# Patient Record
Sex: FEMALE | Race: WHITE | NOT HISPANIC OR LATINO | Employment: FULL TIME | URBAN - METROPOLITAN AREA
[De-identification: names, ages, dates, MRNs, and addresses within clinical notes are randomized per-mention and may not be internally consistent; named-entity substitution may affect disease eponyms.]

---

## 2017-08-11 ENCOUNTER — APPOINTMENT (OUTPATIENT)
Dept: WOMENS IMAGING | Facility: HOSPITAL | Age: 47
End: 2017-08-11

## 2017-08-11 PROCEDURE — 77067 SCR MAMMO BI INCL CAD: CPT | Performed by: RADIOLOGY

## 2018-11-20 ENCOUNTER — APPOINTMENT (OUTPATIENT)
Dept: WOMENS IMAGING | Facility: HOSPITAL | Age: 48
End: 2018-11-20

## 2018-11-20 PROCEDURE — 77067 SCR MAMMO BI INCL CAD: CPT | Performed by: RADIOLOGY

## 2019-09-12 PROBLEM — K21.9 GERD WITHOUT ESOPHAGITIS: Status: ACTIVE | Noted: 2019-09-12

## 2019-09-12 PROBLEM — E78.1 HYPERTRIGLYCERIDEMIA: Status: ACTIVE | Noted: 2019-09-12

## 2019-09-12 RX ORDER — VALACYCLOVIR HYDROCHLORIDE 1 G/1
1000 TABLET, FILM COATED ORAL
COMMUNITY
Start: 2017-12-21 | End: 2020-11-17 | Stop reason: SDUPTHER

## 2019-09-12 RX ORDER — BUPROPION HYDROCHLORIDE 150 MG/1
TABLET ORAL
COMMUNITY
Start: 2017-10-27 | End: 2019-11-18

## 2019-09-12 RX ORDER — CLONAZEPAM 0.5 MG/1
TABLET ORAL
Qty: 60 TABLET | Refills: 0
Start: 2019-09-12 | End: 2019-09-16 | Stop reason: SDUPTHER

## 2019-09-16 ENCOUNTER — OFFICE VISIT (OUTPATIENT)
Dept: FAMILY MEDICINE CLINIC | Facility: CLINIC | Age: 49
End: 2019-09-16

## 2019-09-16 VITALS
HEART RATE: 73 BPM | BODY MASS INDEX: 30.4 KG/M2 | TEMPERATURE: 98.3 F | SYSTOLIC BLOOD PRESSURE: 104 MMHG | DIASTOLIC BLOOD PRESSURE: 66 MMHG | OXYGEN SATURATION: 94 % | WEIGHT: 178.1 LBS | HEIGHT: 64 IN

## 2019-09-16 DIAGNOSIS — F51.04 PSYCHOPHYSIOLOGICAL INSOMNIA: Primary | ICD-10-CM

## 2019-09-16 DIAGNOSIS — E53.8 VITAMIN B12 DEFICIENCY: ICD-10-CM

## 2019-09-16 DIAGNOSIS — F41.9 ANXIETY AND DEPRESSION: ICD-10-CM

## 2019-09-16 DIAGNOSIS — F32.A ANXIETY AND DEPRESSION: ICD-10-CM

## 2019-09-16 DIAGNOSIS — E61.1 IRON DEFICIENCY: ICD-10-CM

## 2019-09-16 PROCEDURE — 99214 OFFICE O/P EST MOD 30 MIN: CPT | Performed by: FAMILY MEDICINE

## 2019-09-16 PROCEDURE — 96372 THER/PROPH/DIAG INJ SC/IM: CPT | Performed by: FAMILY MEDICINE

## 2019-09-16 RX ORDER — CYANOCOBALAMIN 1000 UG/ML
1000 INJECTION, SOLUTION INTRAMUSCULAR; SUBCUTANEOUS
Status: DISCONTINUED | OUTPATIENT
Start: 2019-09-16 | End: 2023-02-13

## 2019-09-16 RX ORDER — ZOLPIDEM TARTRATE 10 MG/1
10 TABLET ORAL NIGHTLY PRN
Qty: 10 TABLET | Refills: 0 | Status: SHIPPED | OUTPATIENT
Start: 2019-09-16 | End: 2019-11-18

## 2019-09-16 RX ORDER — CLONAZEPAM 0.5 MG/1
TABLET ORAL
Qty: 60 TABLET | Refills: 0
Start: 2019-09-16 | End: 2019-10-22 | Stop reason: SDUPTHER

## 2019-09-16 RX ADMIN — CYANOCOBALAMIN 1000 MCG: 1000 INJECTION, SOLUTION INTRAMUSCULAR; SUBCUTANEOUS at 17:01

## 2019-09-16 NOTE — PROGRESS NOTES
Chief Complaint   Patient presents with   • Depression   • Anxiety   • Heartburn       Subjective   Media ZINA Lopez is a 49 y.o. female.     History of Present Illness   F/U KEVIN.  Doing well with meds.  oN daily.   F/U ARCENIO.  I am doing well with anxiety despite a lot of deaths in family.    F/U insomnia.   I have had trouble for 15 years.   I am having trouble with sleep recently.  I would like to switch to ambien 10 at [facility].     FU B12 def.  I have not received B12 in a while.      The following portions of the patient's history were reviewed and updated as appropriate: allergies, current medications, past family history, past medical history, past social history, past surgical history and problem list.    Review of Systems   Constitutional: Negative for appetite change and fatigue.   HENT: Negative for nosebleeds and sore throat.    Eyes: Negative for blurred vision and visual disturbance.   Respiratory: Negative for shortness of breath and wheezing.    Cardiovascular: Negative for chest pain and leg swelling.   Gastrointestinal: Negative for abdominal distention and abdominal pain.   Endocrine: Negative for cold intolerance and polyuria.   Genitourinary: Negative for dysuria and hematuria.   Musculoskeletal: Negative for arthralgias and myalgias.   Skin: Negative for color change and rash.   Neurological: Negative for weakness and confusion.   Psychiatric/Behavioral: Negative for agitation and depressed mood.       Patient Active Problem List   Diagnosis   • Back pain   • Anxiety and depression   • Goiter   • Fatigue   • Fibromyalgia   • Herpes zoster   • Insomnia   • Iron deficiency   • Myalgia   • Vitamin B12 deficiency   • Vitamin D deficiency   • History of MRSA infection   • Large bowel obstruction (CMS/HCC)   • Seasonal allergies   • GERD without esophagitis   • Hypertriglyceridemia       No Known Allergies      Current Outpatient Medications:   •  Bioflavonoid Products (VITAMIN C) chewable tablet, Chew 240  mg 2 (Two) Times a Day., Disp: , Rfl:   •  buPROPion XL (WELLBUTRIN XL) 150 MG 24 hr tablet, , Disp: , Rfl:   •  Cholecalciferol (VITAMIN D3) 5000 UNITS capsule capsule, Take 5,000 Units by mouth daily., Disp: , Rfl:   •  clonazePAM (KlonoPIN) 0.5 MG tablet, 1/2 in AM, 1/2 IN AFTERNOON AND TWO AT NIGHT PRN ANXIETY, Disp: 60 tablet, Rfl: 0  •  diclofenac (VOLTAREN) 1 % gel gel, Place 4 g on the skin as directed by provider 4 (Four) Times a Day., Disp: , Rfl:   •  omeprazole OTC (PriLOSEC OTC) 20 MG EC tablet, Take 20 mg by mouth daily., Disp: , Rfl:   •  Pediatric Multiple Vit-C-FA (FLINSTONES GUMMIES OMEGA-3 DHA) chewable tablet, Chew 2 tablets Daily., Disp: , Rfl:   •  valACYclovir (VALTREX) 1000 MG tablet, Take 1,000 mg by mouth., Disp: , Rfl:   •  zolpidem (AMBIEN) 10 MG tablet, Take 1 tablet by mouth At Night As Needed for Sleep., Disp: 10 tablet, Rfl: 0    Current Facility-Administered Medications:   •  cyanocobalamin injection 1,000 mcg, 1,000 mcg, Intramuscular, Q28 Days, Gamal Gamez MD    Past Medical History:   Diagnosis Date   • Acute upper respiratory infection    • Anemia    • Anxiety    • Arthritis    • Depression    • External hemorrhoid, thrombosed    • Fibromyalgia    • Folliculitis    • ARCENIO (generalized anxiety disorder)    • Gastroparesis    • GERD (gastroesophageal reflux disease)    • GERD without esophagitis    • Heartburn    • Hemorrhoids    • High risk medication use    • History of staph infection     LAST TIME 4 YEARS AGO INSIDE RIGHT NARIS   • Hypertriglyceridemia    • Immunization deficiency    • Infected prosthetic mesh of abdominal wall (CMS/HCC)    • Insomnia    • MRSA carrier    • Persistent insomnia    • Pica    • PONV (postoperative nausea and vomiting)    • Shingles    • Strain of rectus abdominis muscle    • Vitamin B12 deficiency    • Volvulus of intestine (CMS/HCC)        Past Surgical History:   Procedure Laterality Date   • ABDOMINOPLASTY     • BACK SURGERY  09/2015     [facility] Surgery[facility]by [facility]   • BARIATRIC SURGERY     • CHOLECYSTECTOMY     • COLECTOMY PARTIAL / TOTAL Right     Right Hemicolectomy   • COLONOSCOPY N/A 7/12/2016    Procedure: COLONOSCOPY into small bowel;  Surgeon: Riley Narayan MD;  Location: [facility] ENDOSCOPY;  Service:    • EXPLORATORY LAPAROTOMY N/A 4/20/2016    Procedure: LAPAROTOMY EXPLORATORY, POSSIBLE BOWEL RESECTION, POSSIBLE OSTOMY;  Surgeon: Riley Narayan MD;  Location: [facility] MAIN OR;  Service:    • EXPLORATORY LAPAROTOMY N/A 12/2/2016    Procedure: exploratory laparotomy, removal of  infected abdominal wall mesh. ;  Surgeon: Riley Narayan MD;  Location: [facility] MAIN OR;  Service:    • GASTRIC BYPASS  2000   • HIP ARTHROSCOPY W/ LABRAL REPAIR Left    • LAPAROSCOPIC TUBAL LIGATION     • OTHER SURGICAL HISTORY      LASER ON BACK   • UMBILICAL HERNIA REPAIR         Family History   Problem Relation Age of Onset   • Heart attack Father    • Diabetes Father    • Stroke Father    • Pancreatitis Father    • Diabetes Sister    • Breast cancer Maternal Grandmother    • Diabetes Paternal Grandmother    • Osteoporosis Paternal Grandmother    • Diabetes Paternal Grandfather    • Arthritis Mother    • No Known Problems Other        Social History     Tobacco Use   • Smoking status: Former Smoker   • Smokeless tobacco: Never Used   • Tobacco comment: smoked less than 1 pack per day for 10 year   Substance Use Topics   • Alcohol use: Yes     Comment: once every couple of months-7 or less drinks per week            Objective     Vitals:    09/16/19 1619   BP: 104/66   Pulse: 73   Temp: 98.3 °F (36.8 °C)   SpO2: 94%     Body mass index is 30.57 kg/m².    Physical Exam   Constitutional: She is oriented to person, place, and time. She appears well-developed and well-nourished.   HENT:   Head: Normocephalic and atraumatic.   Right Ear: External ear normal.   Left Ear: External ear normal.   Nose: Nose normal.    Mouth/Throat: Oropharynx is clear and moist.   Eyes: Conjunctivae and EOM are normal. Pupils are equal, round, and reactive to light.   Neck: Normal range of motion. Neck supple. No tracheal deviation present. No thyromegaly present.   Cardiovascular: Normal rate, regular rhythm and normal heart sounds. Exam reveals no gallop and no friction rub.   No murmur heard.  Pulmonary/Chest: Effort normal and breath sounds normal. No respiratory distress. She exhibits no tenderness.   Abdominal: Soft. Bowel sounds are normal. She exhibits no distension. There is no tenderness.   Musculoskeletal: Normal range of motion. She exhibits no edema or tenderness.   Lymphadenopathy:     She has no cervical adenopathy.   Neurological: She is alert and oriented to person, place, and time. She displays normal reflexes. No cranial nerve deficit or sensory deficit. Coordination normal.   Skin: Skin is warm and dry.   Psychiatric: She has a normal mood and affect. Her behavior is normal. Judgment and thought content normal.   Nursing note and vitals reviewed.      Lab Results   Component Value Date    GLUCOSE 91 12/04/2016    BUN 15 12/04/2016    CREATININE 0.64 12/04/2016    EGFRIFNONA 100 12/04/2016    EGFRIFAFRI 109 12/03/2015    BCR 23.4 12/04/2016    K 4.0 12/04/2016    CO2 22.9 12/04/2016    CALCIUM 8.1 (L) 12/04/2016    PROTENTOTREF 6.8 12/03/2015    ALBUMIN 4.40 04/19/2016    LABIL2 1.8 12/03/2015    AST 20 04/19/2016    ALT 13 04/19/2016       WBC   Date Value Ref Range Status   12/03/2016 12.16 (H) 4.50 - 10.70 10*3/mm3 Final     RBC   Date Value Ref Range Status   12/03/2016 3.43 (L) 3.90 - 5.20 10*6/mm3 Final     Hemoglobin   Date Value Ref Range Status   12/03/2016 9.1 (L) 11.9 - 15.5 g/dL Final     Hematocrit   Date Value Ref Range Status   12/03/2016 30.2 (L) 35.6 - 45.5 % Final     MCV   Date Value Ref Range Status   12/03/2016 88.0 80.5 - 98.2 fL Final     MCH   Date Value Ref Range Status   12/03/2016 26.5 (L) 26.9 -  32.0 pg Final     MCHC   Date Value Ref Range Status   12/03/2016 30.1 (L) 32.4 - 36.3 g/dL Final     RDW   Date Value Ref Range Status   12/03/2016 14.4 (H) 11.7 - 13.0 % Final     RDW-SD   Date Value Ref Range Status   12/03/2016 46.7 37.0 - 54.0 fl Final     MPV   Date Value Ref Range Status   12/03/2016 10.2 6.0 - 12.0 fL Final     Platelets   Date Value Ref Range Status   12/03/2016 277 140 - 500 10*3/mm3 Final     Neutrophil %   Date Value Ref Range Status   12/03/2016 83.8 (H) 42.7 - 76.0 % Final     Lymphocyte %   Date Value Ref Range Status   12/03/2016 7.9 (L) 19.6 - 45.3 % Final     Monocyte %   Date Value Ref Range Status   12/03/2016 5.7 5.0 - 12.0 % Final     Eosinophil %   Date Value Ref Range Status   12/03/2016 2.3 0.3 - 6.2 % Final     Basophil %   Date Value Ref Range Status   12/03/2016 0.1 0.0 - 1.5 % Final     Immature Grans %   Date Value Ref Range Status   12/03/2016 0.2 0.0 - 0.5 % Final     Neutrophils, Absolute   Date Value Ref Range Status   12/03/2016 10.19 (H) 1.90 - 8.10 10*3/mm3 Final     Lymphocytes, Absolute   Date Value Ref Range Status   12/03/2016 0.96 0.90 - 4.80 10*3/mm3 Final     Monocytes, Absolute   Date Value Ref Range Status   12/03/2016 0.69 0.20 - 1.20 10*3/mm3 Final     Eosinophils, Absolute   Date Value Ref Range Status   12/03/2016 0.28 0.00 - 0.70 10*3/mm3 Final     Basophils, Absolute   Date Value Ref Range Status   12/03/2016 0.01 0.00 - 0.20 10*3/mm3 Final     Immature Grans, Absolute   Date Value Ref Range Status   12/03/2016 0.03 0.00 - 0.03 10*3/mm3 Final     nRBC   Date Value Ref Range Status   12/03/2015 CANCELED       Comment:     Result canceled by the ancillary       No results found for: HGBA1C    No results found for: [id number]    TSH   Date Value Ref Range Status   12/03/2015 2.830 0.450 - 4.500 uIU/mL Final       No results found for: CHOL  No results found for: TRIG  No results found for: HDL  No results found for: LDL  No results found for: VLDL  No  results found for: LDLHDL      Procedures    Assessment/Plan   Problems Addressed this Visit        Digestive    Iron deficiency    Vitamin B12 deficiency    Relevant Medications    cyanocobalamin injection 1,000 mcg (Start on 9/16/2019  5:45 PM)       Other    Anxiety and depression    Relevant Medications    clonazePAM (KlonoPIN) 0.5 MG tablet    Insomnia - Primary          No orders of the defined types were placed in this encounter.    Plan is to wean off clonazepam and switch to ambien at night.    Current Outpatient Medications   Medication Sig Dispense Refill   • Bioflavonoid Products (VITAMIN C) chewable tablet Chew 240 mg 2 (Two) Times a Day.     • buPROPion XL (WELLBUTRIN XL) 150 MG 24 hr tablet      • Cholecalciferol (VITAMIN D3) 5000 UNITS capsule capsule Take 5,000 Units by mouth daily.     • clonazePAM (KlonoPIN) 0.5 MG tablet 1/2 in AM, 1/2 IN AFTERNOON AND TWO AT NIGHT PRN ANXIETY 60 tablet 0   • diclofenac (VOLTAREN) 1 % gel gel Place 4 g on the skin as directed by provider 4 (Four) Times a Day.     • omeprazole OTC (PriLOSEC OTC) 20 MG EC tablet Take 20 mg by mouth daily.     • Pediatric Multiple Vit-C-FA (FLINSTONES GUMMIES OMEGA-3 DHA) chewable tablet Chew 2 tablets Daily.     • valACYclovir (VALTREX) 1000 MG tablet Take 1,000 mg by mouth.     • zolpidem (AMBIEN) 10 MG tablet Take 1 tablet by mouth At Night As Needed for Sleep. 10 tablet 0     Current Facility-Administered Medications   Medication Dose Route Frequency Provider Last Rate Last Dose   • cyanocobalamin injection 1,000 mcg  1,000 mcg Intramuscular Q28 Days Gamal Gamez MD Media L. Smith had no medications administered during this visit.    Return in about 2 months (around 11/16/2019).    There are no Patient Instructions on file for this visit.

## 2019-10-17 ENCOUNTER — CLINICAL SUPPORT (OUTPATIENT)
Dept: FAMILY MEDICINE CLINIC | Facility: CLINIC | Age: 49
End: 2019-10-17

## 2019-10-17 DIAGNOSIS — E53.8 VITAMIN B12 DEFICIENCY: ICD-10-CM

## 2019-10-17 PROCEDURE — 96372 THER/PROPH/DIAG INJ SC/IM: CPT | Performed by: FAMILY MEDICINE

## 2019-10-17 RX ADMIN — CYANOCOBALAMIN 1000 MCG: 1000 INJECTION, SOLUTION INTRAMUSCULAR; SUBCUTANEOUS at 10:11

## 2019-10-22 DIAGNOSIS — F41.9 ANXIETY AND DEPRESSION: ICD-10-CM

## 2019-10-22 DIAGNOSIS — F32.A ANXIETY AND DEPRESSION: ICD-10-CM

## 2019-10-22 RX ORDER — CLONAZEPAM 0.5 MG/1
TABLET ORAL
Qty: 90 TABLET | Refills: 0 | Status: SHIPPED | OUTPATIENT
Start: 2019-10-22 | End: 2019-11-18 | Stop reason: SDUPTHER

## 2019-11-18 ENCOUNTER — OFFICE VISIT (OUTPATIENT)
Dept: FAMILY MEDICINE CLINIC | Facility: CLINIC | Age: 49
End: 2019-11-18

## 2019-11-18 VITALS
WEIGHT: 178 LBS | SYSTOLIC BLOOD PRESSURE: 113 MMHG | TEMPERATURE: 98.6 F | BODY MASS INDEX: 30.39 KG/M2 | HEIGHT: 64 IN | DIASTOLIC BLOOD PRESSURE: 72 MMHG | OXYGEN SATURATION: 98 % | HEART RATE: 65 BPM

## 2019-11-18 DIAGNOSIS — K21.9 GERD WITHOUT ESOPHAGITIS: ICD-10-CM

## 2019-11-18 DIAGNOSIS — M79.7 FIBROMYALGIA: ICD-10-CM

## 2019-11-18 DIAGNOSIS — F41.9 ANXIETY AND DEPRESSION: Primary | ICD-10-CM

## 2019-11-18 DIAGNOSIS — F51.01 PRIMARY INSOMNIA: ICD-10-CM

## 2019-11-18 DIAGNOSIS — F32.A ANXIETY AND DEPRESSION: Primary | ICD-10-CM

## 2019-11-18 PROCEDURE — 90686 IIV4 VACC NO PRSV 0.5 ML IM: CPT | Performed by: FAMILY MEDICINE

## 2019-11-18 PROCEDURE — 99214 OFFICE O/P EST MOD 30 MIN: CPT | Performed by: FAMILY MEDICINE

## 2019-11-18 PROCEDURE — 90471 IMMUNIZATION ADMIN: CPT | Performed by: FAMILY MEDICINE

## 2019-11-18 PROCEDURE — 96372 THER/PROPH/DIAG INJ SC/IM: CPT | Performed by: FAMILY MEDICINE

## 2019-11-18 RX ORDER — CLONAZEPAM 0.5 MG/1
TABLET ORAL
Qty: 105 TABLET | Refills: 3 | Status: SHIPPED | OUTPATIENT
Start: 2019-11-18 | End: 2020-03-12 | Stop reason: SDUPTHER

## 2019-11-18 RX ADMIN — CYANOCOBALAMIN 1000 MCG: 1000 INJECTION, SOLUTION INTRAMUSCULAR; SUBCUTANEOUS at 15:42

## 2019-11-18 NOTE — PROGRESS NOTES
Chief Complaint   Patient presents with   • Depression       Subjective   Media ZINA Lopez is a 49 y.o. female.     History of Present Illness   F/u depression and fibro.  I am not depressed any longer.  I am only taking clonazepam 3 tablets of the 0.5 mg a day.    F/U insomnia.  I am doing better with the anxiety.  Sleep is my only issue now.  I cannot stay asleep.  Ambien does not last long enough.  Clonazepam did well in the past.    F/U KEVIN.  Doing well with PRN PPI.    The following portions of the patient's history were reviewed and updated as appropriate: allergies, current medications, past family history, past medical history, past social history, past surgical history and problem list.    Review of Systems   Constitutional: Negative for appetite change and fatigue.   HENT: Negative for nosebleeds and sore throat.    Eyes: Negative for blurred vision and visual disturbance.   Respiratory: Negative for shortness of breath and wheezing.    Cardiovascular: Negative for chest pain and leg swelling.   Gastrointestinal: Negative for abdominal distention and abdominal pain.   Endocrine: Negative for cold intolerance and polyuria.   Genitourinary: Negative for dysuria and hematuria.   Musculoskeletal: Negative for arthralgias and myalgias.   Skin: Negative for color change and rash.   Neurological: Negative for weakness and confusion.   Psychiatric/Behavioral: Negative for agitation and depressed mood. The patient is nervous/anxious.        Patient Active Problem List   Diagnosis   • Back pain   • Anxiety and depression   • Goiter   • Fatigue   • Fibromyalgia   • Herpes zoster   • Insomnia   • Iron deficiency   • Myalgia   • Vitamin B12 deficiency   • Vitamin D deficiency   • History of MRSA infection   • Large bowel obstruction (CMS/HCC)   • Seasonal allergies   • GERD without esophagitis   • Hypertriglyceridemia       No Known Allergies      Current Outpatient Medications:   •  Bioflavonoid Products (VITAMIN C)  chewable tablet, Chew 240 mg 2 (Two) Times a Day., Disp: , Rfl:   •  Cholecalciferol (VITAMIN D3) 5000 UNITS capsule capsule, Take 5,000 Units by mouth daily., Disp: , Rfl:   •  clonazePAM (KlonoPIN) 0.5 MG tablet, 1/2 in AM AND Three AT NIGHT PRN ANXIETY/insomnia, Disp: 105 tablet, Rfl: 3  •  diclofenac (VOLTAREN) 1 % gel gel, Place 4 g on the skin as directed by provider 4 (Four) Times a Day., Disp: , Rfl:   •  Pediatric Multiple Vit-C-FA (FLINSTONES GUMMIES OMEGA-3 DHA) chewable tablet, Chew 2 tablets Daily., Disp: , Rfl:   •  valACYclovir (VALTREX) 1000 MG tablet, Take 1,000 mg by mouth., Disp: , Rfl:   •  omeprazole OTC (PriLOSEC OTC) 20 MG EC tablet, Take 20 mg by mouth daily., Disp: , Rfl:     Current Facility-Administered Medications:   •  cyanocobalamin injection 1,000 mcg, 1,000 mcg, Intramuscular, Q28 Days, Gamal Gamez MD, 1,000 mcg at 10/17/19 1011    Past Medical History:   Diagnosis Date   • Acute upper respiratory infection    • Anemia    • Anxiety    • Arthritis    • Depression    • External hemorrhoid, thrombosed    • Fibromyalgia    • Folliculitis    • ARCENIO (generalized anxiety disorder)    • Gastroparesis    • GERD (gastroesophageal reflux disease)    • GERD without esophagitis    • Heartburn    • Hemorrhoids    • High risk medication use    • History of staph infection     LAST TIME 4 YEARS AGO INSIDE RIGHT NARIS   • Hypertriglyceridemia    • Immunization deficiency    • Infected prosthetic mesh of abdominal wall (CMS/HCC)    • Insomnia    • MRSA carrier    • Persistent insomnia    • Pica    • PONV (postoperative nausea and vomiting)    • Shingles    • Strain of rectus abdominis muscle    • Vitamin B12 deficiency    • Volvulus of intestine (CMS/HCC)        Past Surgical History:   Procedure Laterality Date   • ABDOMINOPLASTY     • BACK SURGERY  09/2015    [facility] Back Surgery[facility]by [facility]   • BARIATRIC SURGERY     • CHOLECYSTECTOMY     • COLECTOMY PARTIAL / TOTAL Right      Right Hemicolectomy   • COLONOSCOPY N/A 7/12/2016    Procedure: COLONOSCOPY into small bowel;  Surgeon: Riley Narayan MD;  Location: [facility] ENDOSCOPY;  Service:    • EXPLORATORY LAPAROTOMY N/A 4/20/2016    Procedure: LAPAROTOMY EXPLORATORY, POSSIBLE BOWEL RESECTION, POSSIBLE OSTOMY;  Surgeon: Riley Narayan MD;  Location: [facility] MAIN OR;  Service:    • EXPLORATORY LAPAROTOMY N/A 12/2/2016    Procedure: exploratory laparotomy, removal of  infected abdominal wall mesh. ;  Surgeon: Riley Narayan MD;  Location: [facility] MAIN OR;  Service:    • GASTRIC BYPASS  2000   • HIP ARTHROSCOPY W/ LABRAL REPAIR Left    • LAPAROSCOPIC TUBAL LIGATION     • OTHER SURGICAL HISTORY      LASER ON BACK   • UMBILICAL HERNIA REPAIR         Family History   Problem Relation Age of Onset   • Heart attack Father    • Diabetes Father    • Stroke Father    • Pancreatitis Father    • Diabetes Sister    • Breast cancer Maternal Grandmother    • Diabetes Paternal Grandmother    • Osteoporosis Paternal Grandmother    • Diabetes Paternal Grandfather    • Arthritis Mother    • No Known Problems Other        Social History     Tobacco Use   • Smoking status: Former Smoker   • Smokeless tobacco: Never Used   • Tobacco comment: smoked less than 1 pack per day for 10 year   Substance Use Topics   • Alcohol use: Yes     Comment: once every couple of months-7 or less drinks per week            Objective     Vitals:    11/18/19 1453   BP: 113/72   Pulse: 65   Temp: 98.6 °F (37 °C)   SpO2: 98%     Body mass index is 30.54 kg/m².    Physical Exam   Constitutional: She is oriented to person, place, and time. She appears well-developed and well-nourished.   HENT:   Head: Normocephalic and atraumatic.   Right Ear: External ear normal.   Left Ear: External ear normal.   Nose: Nose normal.   Mouth/Throat: Oropharynx is clear and moist.   Eyes: Conjunctivae and EOM are normal. Pupils are equal, round, and reactive to light.   Neck:  Normal range of motion. Neck supple. No tracheal deviation present. No thyromegaly present.   Cardiovascular: Normal rate, regular rhythm and normal heart sounds. Exam reveals no gallop and no friction rub.   No murmur heard.  Pulmonary/Chest: Effort normal and breath sounds normal. No respiratory distress. She exhibits no tenderness.   Abdominal: Soft. Bowel sounds are normal. She exhibits no distension. There is no tenderness.   Musculoskeletal: Normal range of motion. She exhibits no edema or tenderness.   Lymphadenopathy:     She has no cervical adenopathy.   Neurological: She is alert and oriented to person, place, and time. She displays normal reflexes. No cranial nerve deficit or sensory deficit. Coordination normal.   Skin: Skin is warm and dry.   Psychiatric: She has a normal mood and affect. Her behavior is normal. Judgment and thought content normal.   Nursing note and vitals reviewed.      Lab Results   Component Value Date    GLUCOSE 91 12/04/2016    BUN 15 12/04/2016    CREATININE 0.64 12/04/2016    EGFRIFNONA 100 12/04/2016    EGFRIFAFRI 109 12/03/2015    BCR 23.4 12/04/2016    K 4.0 12/04/2016    CO2 22.9 12/04/2016    CALCIUM 8.1 (L) 12/04/2016    PROTENTOTREF 6.8 12/03/2015    ALBUMIN 4.40 04/19/2016    LABIL2 1.8 12/03/2015    AST 20 04/19/2016    ALT 13 04/19/2016       WBC   Date Value Ref Range Status   12/03/2016 12.16 (H) 4.50 - 10.70 10*3/mm3 Final     RBC   Date Value Ref Range Status   12/03/2016 3.43 (L) 3.90 - 5.20 10*6/mm3 Final     Hemoglobin   Date Value Ref Range Status   12/03/2016 9.1 (L) 11.9 - 15.5 g/dL Final     Hematocrit   Date Value Ref Range Status   12/03/2016 30.2 (L) 35.6 - 45.5 % Final     MCV   Date Value Ref Range Status   12/03/2016 88.0 80.5 - 98.2 fL Final     MCH   Date Value Ref Range Status   12/03/2016 26.5 (L) 26.9 - 32.0 pg Final     MCHC   Date Value Ref Range Status   12/03/2016 30.1 (L) 32.4 - 36.3 g/dL Final     RDW   Date Value Ref Range Status    12/03/2016 14.4 (H) 11.7 - 13.0 % Final     RDW-SD   Date Value Ref Range Status   12/03/2016 46.7 37.0 - 54.0 fl Final     MPV   Date Value Ref Range Status   12/03/2016 10.2 6.0 - 12.0 fL Final     Platelets   Date Value Ref Range Status   12/03/2016 277 140 - 500 10*3/mm3 Final     Neutrophil %   Date Value Ref Range Status   12/03/2016 83.8 (H) 42.7 - 76.0 % Final     Lymphocyte %   Date Value Ref Range Status   12/03/2016 7.9 (L) 19.6 - 45.3 % Final     Monocyte %   Date Value Ref Range Status   12/03/2016 5.7 5.0 - 12.0 % Final     Eosinophil %   Date Value Ref Range Status   12/03/2016 2.3 0.3 - 6.2 % Final     Basophil %   Date Value Ref Range Status   12/03/2016 0.1 0.0 - 1.5 % Final     Immature Grans %   Date Value Ref Range Status   12/03/2016 0.2 0.0 - 0.5 % Final     Neutrophils, Absolute   Date Value Ref Range Status   12/03/2016 10.19 (H) 1.90 - 8.10 10*3/mm3 Final     Lymphocytes, Absolute   Date Value Ref Range Status   12/03/2016 0.96 0.90 - 4.80 10*3/mm3 Final     Monocytes, Absolute   Date Value Ref Range Status   12/03/2016 0.69 0.20 - 1.20 10*3/mm3 Final     Eosinophils, Absolute   Date Value Ref Range Status   12/03/2016 0.28 0.00 - 0.70 10*3/mm3 Final     Basophils, Absolute   Date Value Ref Range Status   12/03/2016 0.01 0.00 - 0.20 10*3/mm3 Final     Immature Grans, Absolute   Date Value Ref Range Status   12/03/2016 0.03 0.00 - 0.03 10*3/mm3 Final     nRBC   Date Value Ref Range Status   12/03/2015 CANCELED       Comment:     Result canceled by the ancillary       No results found for: HGBA1C    No results found for: [id number]    TSH   Date Value Ref Range Status   12/03/2015 2.830 0.450 - 4.500 uIU/mL Final       No results found for: CHOL  No results found for: TRIG  No results found for: HDL  No results found for: LDL  No results found for: VLDL  No results found for: LDLHDL      Procedures    Assessment/Plan   Problems Addressed this Visit        Digestive    GERD without  esophagitis       Nervous and Auditory    Fibromyalgia       Other    Anxiety and depression - Primary    Relevant Medications    clonazePAM (KlonoPIN) 0.5 MG tablet    Insomnia          Orders Placed This Encounter   Procedures   • Fluarix/Fluzone/Afluria Quad>6 Months       Current Outpatient Medications   Medication Sig Dispense Refill   • Bioflavonoid Products (VITAMIN C) chewable tablet Chew 240 mg 2 (Two) Times a Day.     • Cholecalciferol (VITAMIN D3) 5000 UNITS capsule capsule Take 5,000 Units by mouth daily.     • clonazePAM (KlonoPIN) 0.5 MG tablet 1/2 in AM AND Three AT NIGHT PRN ANXIETY/insomnia 105 tablet 3   • diclofenac (VOLTAREN) 1 % gel gel Place 4 g on the skin as directed by provider 4 (Four) Times a Day.     • Pediatric Multiple Vit-C-FA (FLINSTONES GUMMIES OMEGA-3 DHA) chewable tablet Chew 2 tablets Daily.     • valACYclovir (VALTREX) 1000 MG tablet Take 1,000 mg by mouth.     • omeprazole OTC (PriLOSEC OTC) 20 MG EC tablet Take 20 mg by mouth daily.       Current Facility-Administered Medications   Medication Dose Route Frequency Provider Last Rate Last Dose   • cyanocobalamin injection 1,000 mcg  1,000 mcg Intramuscular Q28 Days Gamal Gamez MD   1,000 mcg at 10/17/19 1011       Juana Lopez had no medications administered during this visit.    Return in about 3 months (around 2/18/2020).    There are no Patient Instructions on file for this visit.  Stop buproprion to see how does.  May help insomnia.

## 2020-03-12 DIAGNOSIS — F41.9 ANXIETY AND DEPRESSION: ICD-10-CM

## 2020-03-12 DIAGNOSIS — F32.A ANXIETY AND DEPRESSION: ICD-10-CM

## 2020-03-12 RX ORDER — CLONAZEPAM 0.5 MG/1
TABLET ORAL
Qty: 50 TABLET | Refills: 0 | Status: SHIPPED | OUTPATIENT
Start: 2020-03-12 | End: 2020-03-18 | Stop reason: SDUPTHER

## 2020-03-12 NOTE — TELEPHONE ENCOUNTER
Patient had to cancel her appointment today because her grandson is in the ER.  She did reschedule her appointment to next week and wants to know if she could still get a med refill on her ClonazePam .5 mg please.  Pharmacy is [facility] on file and her number is [phone].

## 2020-03-18 ENCOUNTER — OFFICE VISIT (OUTPATIENT)
Dept: FAMILY MEDICINE CLINIC | Facility: CLINIC | Age: 50
End: 2020-03-18

## 2020-03-18 VITALS
DIASTOLIC BLOOD PRESSURE: 70 MMHG | HEART RATE: 89 BPM | TEMPERATURE: 99.1 F | HEIGHT: 64 IN | OXYGEN SATURATION: 94 % | BODY MASS INDEX: 31.72 KG/M2 | SYSTOLIC BLOOD PRESSURE: 122 MMHG | WEIGHT: 185.8 LBS

## 2020-03-18 DIAGNOSIS — F32.A ANXIETY AND DEPRESSION: ICD-10-CM

## 2020-03-18 DIAGNOSIS — F41.1 GENERALIZED ANXIETY DISORDER: ICD-10-CM

## 2020-03-18 DIAGNOSIS — M79.7 FIBROMYALGIA: ICD-10-CM

## 2020-03-18 DIAGNOSIS — E53.8 VITAMIN B12 DEFICIENCY: Primary | ICD-10-CM

## 2020-03-18 DIAGNOSIS — K21.9 GERD WITHOUT ESOPHAGITIS: ICD-10-CM

## 2020-03-18 DIAGNOSIS — F41.9 ANXIETY AND DEPRESSION: ICD-10-CM

## 2020-03-18 PROCEDURE — 99214 OFFICE O/P EST MOD 30 MIN: CPT | Performed by: FAMILY MEDICINE

## 2020-03-18 PROCEDURE — 96372 THER/PROPH/DIAG INJ SC/IM: CPT | Performed by: FAMILY MEDICINE

## 2020-03-18 RX ORDER — BUPROPION HYDROCHLORIDE 150 MG/1
150 TABLET ORAL EVERY MORNING
Qty: 90 TABLET | Refills: 1 | Status: SHIPPED | OUTPATIENT
Start: 2020-03-18 | End: 2020-04-17

## 2020-03-18 RX ORDER — MAGNESIUM GLUCONATE 27 MG(500)
27 TABLET ORAL DAILY
COMMUNITY
End: 2022-10-05

## 2020-03-18 RX ORDER — CLONAZEPAM 0.5 MG/1
TABLET ORAL
Qty: 90 TABLET | Refills: 3 | Status: SHIPPED | OUTPATIENT
Start: 2020-03-18 | End: 2020-04-10 | Stop reason: SDUPTHER

## 2020-03-18 RX ADMIN — CYANOCOBALAMIN 1000 MCG: 1000 INJECTION, SOLUTION INTRAMUSCULAR; SUBCUTANEOUS at 15:55

## 2020-03-18 NOTE — PROGRESS NOTES
Chief Complaint   Patient presents with   • Anxiety   • check up       Subjective   Media ZINA Lopez is a 50 y.o. female.     History of Present Illness   F/U ARCENIO.  Doing wel lwith meds.  ON clonazepam 0.5 1/2 in AM and 3 at night.    F/U KEVIN.  Doing well with meds.   F/U fibro.  Doing well with meds.  Getting sleep helps me with my pain.  F/U B12 def.  Needs B!2 shot.    The following portions of the patient's history were reviewed and updated as appropriate: allergies, current medications, past family history, past medical history, past social history, past surgical history and problem list.    Review of Systems   Constitutional: Negative for appetite change and fatigue.   HENT: Negative for nosebleeds and sore throat.    Eyes: Negative for blurred vision and visual disturbance.   Respiratory: Negative for shortness of breath and wheezing.    Cardiovascular: Negative for chest pain and leg swelling.   Gastrointestinal: Negative for abdominal distention and abdominal pain.   Endocrine: Negative for cold intolerance and polyuria.   Genitourinary: Negative for dysuria and hematuria.   Musculoskeletal: Negative for arthralgias and myalgias.   Skin: Negative for color change and rash.   Neurological: Negative for weakness and confusion.   Psychiatric/Behavioral: Negative for agitation and depressed mood.       Patient Active Problem List   Diagnosis   • Back pain   • Anxiety and depression   • Goiter   • Fatigue   • Fibromyalgia   • Herpes zoster   • Insomnia   • Iron deficiency   • Myalgia   • Vitamin B12 deficiency   • Vitamin D deficiency   • History of MRSA infection   • Large bowel obstruction (CMS/HCC)   • Seasonal allergies   • GERD without esophagitis   • Hypertriglyceridemia   • Generalized anxiety disorder       No Known Allergies      Current Outpatient Medications:   •  Bioflavonoid Products (VITAMIN C) chewable tablet, Chew 240 mg 2 (Two) Times a Day., Disp: , Rfl:   •  Cholecalciferol (VITAMIN D3) 5000 UNITS  capsule capsule, Take 5,000 Units by mouth daily., Disp: , Rfl:   •  clonazePAM (KlonoPIN) 0.5 MG tablet, 1/2 in AM AND Three AT NIGHT PRN ANXIETY/insomnia, Disp: 90 tablet, Rfl: 3  •  diclofenac (VOLTAREN) 1 % gel gel, Place 4 g on the skin as directed by provider 4 (Four) Times a Day., Disp: , Rfl:   •  magnesium gluconate (MAGONATE) 500 MG tablet, Take 27 mg by mouth Daily., Disp: , Rfl:   •  omeprazole OTC (PriLOSEC OTC) 20 MG EC tablet, Take 20 mg by mouth daily., Disp: , Rfl:   •  Pediatric Multiple Vit-C-FA (FLINSTONES GUMMIES OMEGA-3 DHA) chewable tablet, Chew 2 tablets Daily., Disp: , Rfl:   •  valACYclovir (VALTREX) 1000 MG tablet, Take 1,000 mg by mouth., Disp: , Rfl:   •  buPROPion XL (WELLBUTRIN XL) 150 MG 24 hr tablet, Take 1 tablet by mouth Every Morning for 30 days., Disp: 90 tablet, Rfl: 1    Current Facility-Administered Medications:   •  cyanocobalamin injection 1,000 mcg, 1,000 mcg, Intramuscular, Q28 Days, Gamal Gamez MD, 1,000 mcg at 11/18/19 1542    Past Medical History:   Diagnosis Date   • Acute upper respiratory infection    • Anemia    • Anxiety    • Arthritis    • Depression    • External hemorrhoid, thrombosed    • Fibromyalgia    • Folliculitis    • ARCENIO (generalized anxiety disorder)    • Gastroparesis    • GERD (gastroesophageal reflux disease)    • GERD without esophagitis    • Heartburn    • Hemorrhoids    • High risk medication use    • History of staph infection     LAST TIME 4 YEARS AGO INSIDE RIGHT NARIS   • Hypertriglyceridemia    • Immunization deficiency    • Infected prosthetic mesh of abdominal wall (CMS/HCC)    • Insomnia    • MRSA carrier    • Persistent insomnia    • Pica    • PONV (postoperative nausea and vomiting)    • Shingles    • Strain of rectus abdominis muscle    • Vitamin B12 deficiency    • Volvulus of intestine (CMS/HCC)        Past Surgical History:   Procedure Laterality Date   • ABDOMINOPLASTY     • BACK SURGERY  09/2015    Lower Back Surgery[facility]by  [facility]   • BARIATRIC SURGERY     • CHOLECYSTECTOMY     • COLECTOMY PARTIAL / TOTAL Right     Right Hemicolectomy   • COLONOSCOPY N/A 7/12/2016    Procedure: COLONOSCOPY into small bowel;  Surgeon: Riley Narayan MD;  Location: [facility] ENDOSCOPY;  Service:    • EXPLORATORY LAPAROTOMY N/A 4/20/2016    Procedure: LAPAROTOMY EXPLORATORY, POSSIBLE BOWEL RESECTION, POSSIBLE OSTOMY;  Surgeon: Riley Narayan MD;  Location: [facility] MAIN OR;  Service:    • EXPLORATORY LAPAROTOMY N/A 12/2/2016    Procedure: exploratory laparotomy, removal of  infected abdominal wall mesh. ;  Surgeon: Riley Narayan MD;  Location: [facility] MAIN OR;  Service:    • GASTRIC BYPASS  2000   • HIP ARTHROSCOPY W/ LABRAL REPAIR Left    • LAPAROSCOPIC TUBAL LIGATION     • OTHER SURGICAL HISTORY      LASER ON BACK   • UMBILICAL HERNIA REPAIR         Family History   Problem Relation Age of Onset   • Heart attack Father    • Diabetes Father    • Stroke Father    • Pancreatitis Father    • Diabetes Sister    • Breast cancer Maternal Grandmother    • Diabetes Paternal Grandmother    • Osteoporosis Paternal Grandmother    • Diabetes Paternal Grandfather    • Arthritis Mother    • No Known Problems Other        Social History     Tobacco Use   • Smoking status: Former Smoker   • Smokeless tobacco: Never Used   • Tobacco comment: smoked less than 1 pack per day for 10 year   Substance Use Topics   • Alcohol use: Yes     Comment: once every couple of months-7 or less drinks per week            Objective     Vitals:    03/18/20 1523   BP: 122/70   Pulse: 89   Temp: 99.1 °F (37.3 °C)   SpO2: 94%     Body mass index is 31.89 kg/m².    Physical Exam   Constitutional: She appears well-developed and well-nourished.   HENT:   Head: Normocephalic and atraumatic.   Mouth/Throat: Oropharynx is clear and moist.   Eyes: Pupils are equal, round, and reactive to light. No scleral icterus.   Neck: No thyromegaly present.    Cardiovascular: Normal rate and regular rhythm. Exam reveals no gallop and no friction rub.   No murmur heard.  Pulmonary/Chest: Effort normal. No respiratory distress. She has no wheezes. She has no rales. She exhibits no tenderness.   Abdominal: Soft. Bowel sounds are normal. She exhibits no distension. There is no tenderness.   Musculoskeletal: Normal range of motion. She exhibits no edema or deformity.   Lymphadenopathy:     She has no cervical adenopathy.   Neurological: No cranial nerve deficit. She exhibits normal muscle tone.   Skin: Skin is warm and dry. No rash noted. She is not diaphoretic.   Vitals reviewed.      Lab Results   Component Value Date    GLUCOSE 91 12/04/2016    BUN 15 12/04/2016    CREATININE 0.64 12/04/2016    EGFRIFNONA 100 12/04/2016    EGFRIFAFRI 109 12/03/2015    BCR 23.4 12/04/2016    K 4.0 12/04/2016    CO2 22.9 12/04/2016    CALCIUM 8.1 (L) 12/04/2016    PROTENTOTREF 6.8 12/03/2015    ALBUMIN 4.40 04/19/2016    LABIL2 1.8 12/03/2015    AST 20 04/19/2016    ALT 13 04/19/2016       WBC   Date Value Ref Range Status   12/03/2016 12.16 (H) 4.50 - 10.70 10*3/mm3 Final     RBC   Date Value Ref Range Status   12/03/2016 3.43 (L) 3.90 - 5.20 10*6/mm3 Final     Hemoglobin   Date Value Ref Range Status   12/03/2016 9.1 (L) 11.9 - 15.5 g/dL Final     Hematocrit   Date Value Ref Range Status   12/03/2016 30.2 (L) 35.6 - 45.5 % Final     MCV   Date Value Ref Range Status   12/03/2016 88.0 80.5 - 98.2 fL Final     MCH   Date Value Ref Range Status   12/03/2016 26.5 (L) 26.9 - 32.0 pg Final     MCHC   Date Value Ref Range Status   12/03/2016 30.1 (L) 32.4 - 36.3 g/dL Final     RDW   Date Value Ref Range Status   12/03/2016 14.4 (H) 11.7 - 13.0 % Final     RDW-SD   Date Value Ref Range Status   12/03/2016 46.7 37.0 - 54.0 fl Final     MPV   Date Value Ref Range Status   12/03/2016 10.2 6.0 - 12.0 fL Final     Platelets   Date Value Ref Range Status   12/03/2016 277 140 - 500 10*3/mm3 Final      Neutrophil %   Date Value Ref Range Status   12/03/2016 83.8 (H) 42.7 - 76.0 % Final     Lymphocyte %   Date Value Ref Range Status   12/03/2016 7.9 (L) 19.6 - 45.3 % Final     Monocyte %   Date Value Ref Range Status   12/03/2016 5.7 5.0 - 12.0 % Final     Eosinophil %   Date Value Ref Range Status   12/03/2016 2.3 0.3 - 6.2 % Final     Basophil %   Date Value Ref Range Status   12/03/2016 0.1 0.0 - 1.5 % Final     Immature Grans %   Date Value Ref Range Status   12/03/2016 0.2 0.0 - 0.5 % Final     Neutrophils, Absolute   Date Value Ref Range Status   12/03/2016 10.19 (H) 1.90 - 8.10 10*3/mm3 Final     Lymphocytes, Absolute   Date Value Ref Range Status   12/03/2016 0.96 0.90 - 4.80 10*3/mm3 Final     Monocytes, Absolute   Date Value Ref Range Status   12/03/2016 0.69 0.20 - 1.20 10*3/mm3 Final     Eosinophils, Absolute   Date Value Ref Range Status   12/03/2016 0.28 0.00 - 0.70 10*3/mm3 Final     Basophils, Absolute   Date Value Ref Range Status   12/03/2016 0.01 0.00 - 0.20 10*3/mm3 Final     Immature Grans, Absolute   Date Value Ref Range Status   12/03/2016 0.03 0.00 - 0.03 10*3/mm3 Final     nRBC   Date Value Ref Range Status   12/03/2015 CANCELED       Comment:     Result canceled by the ancillary       No results found for: HGBA1C    No results found for: QWGZXPCY94    TSH   Date Value Ref Range Status   12/03/2015 2.830 0.450 - 4.500 uIU/mL Final       No results found for: CHOL  No results found for: TRIG  No results found for: HDL  No results found for: LDL  No results found for: VLDL  No results found for: LDLHDL      Procedures    Assessment/Plan   Problems Addressed this Visit        Digestive    Vitamin B12 deficiency - Primary    GERD without esophagitis       Nervous and Auditory    Fibromyalgia       Other    Anxiety and depression    Relevant Medications    buPROPion XL (WELLBUTRIN XL) 150 MG 24 hr tablet    clonazePAM (KlonoPIN) 0.5 MG tablet    Generalized anxiety disorder    Relevant  Medications    buPROPion XL (WELLBUTRIN XL) 150 MG 24 hr tablet      Give B12 def.    Continue PPI.    Continue anxiolytic ot help with anxiety/sleep.        No orders of the defined types were placed in this encounter.      Current Outpatient Medications   Medication Sig Dispense Refill   • Bioflavonoid Products (VITAMIN C) chewable tablet Chew 240 mg 2 (Two) Times a Day.     • Cholecalciferol (VITAMIN D3) 5000 UNITS capsule capsule Take 5,000 Units by mouth daily.     • clonazePAM (KlonoPIN) 0.5 MG tablet 1/2 in AM AND Three AT NIGHT PRN ANXIETY/insomnia 90 tablet 3   • diclofenac (VOLTAREN) 1 % gel gel Place 4 g on the skin as directed by provider 4 (Four) Times a Day.     • magnesium gluconate (MAGONATE) 500 MG tablet Take 27 mg by mouth Daily.     • omeprazole OTC (PriLOSEC OTC) 20 MG EC tablet Take 20 mg by mouth daily.     • Pediatric Multiple Vit-C-FA (FLINSTONES GUMMIES OMEGA-3 DHA) chewable tablet Chew 2 tablets Daily.     • valACYclovir (VALTREX) 1000 MG tablet Take 1,000 mg by mouth.     • buPROPion XL (WELLBUTRIN XL) 150 MG 24 hr tablet Take 1 tablet by mouth Every Morning for 30 days. 90 tablet 1     Current Facility-Administered Medications   Medication Dose Route Frequency Provider Last Rate Last Dose   • cyanocobalamin injection 1,000 mcg  1,000 mcg Intramuscular Q28 Days Gamal Gamez MD   1,000 mcg at 11/18/19 1542       Juana Lopez had no medications administered during this visit.    Return in about 4 months (around 7/18/2020).    There are no Patient Instructions on file for this visit.

## 2020-04-10 ENCOUNTER — TELEPHONE (OUTPATIENT)
Dept: FAMILY MEDICINE CLINIC | Facility: CLINIC | Age: 50
End: 2020-04-10

## 2020-04-10 DIAGNOSIS — F41.9 ANXIETY AND DEPRESSION: ICD-10-CM

## 2020-04-10 DIAGNOSIS — F32.A ANXIETY AND DEPRESSION: ICD-10-CM

## 2020-04-10 RX ORDER — CLONAZEPAM 0.5 MG/1
TABLET ORAL
Qty: 90 TABLET | Refills: 3 | Status: SHIPPED | OUTPATIENT
Start: 2020-04-10 | End: 2020-04-13 | Stop reason: SDUPTHER

## 2020-04-10 NOTE — TELEPHONE ENCOUNTER
Can you send this in for her please? Last time it was filled we only gave 50 bc it said she needed an appointment, however she was seen 03/18/2020

## 2020-04-10 NOTE — TELEPHONE ENCOUNTER
Pt called and state that the medication refilled is 15 pills short. It should be a 30 day supply. Pt ask that a script for the additional pills is sent it stating it is ok to fill now.  clonazePAM (KlonoPIN) 0.5 MG tablet        Si/2 in AM AND Three AT NIGHT PRN ANXIETY/insomnia

## 2020-04-13 DIAGNOSIS — F32.A ANXIETY AND DEPRESSION: ICD-10-CM

## 2020-04-13 DIAGNOSIS — F41.9 ANXIETY AND DEPRESSION: ICD-10-CM

## 2020-04-13 RX ORDER — CLONAZEPAM 0.5 MG/1
TABLET ORAL
Qty: 90 TABLET | Refills: 3 | Status: SHIPPED | OUTPATIENT
Start: 2020-04-13 | End: 2020-04-13 | Stop reason: SDUPTHER

## 2020-04-13 RX ORDER — CLONAZEPAM 0.5 MG/1
TABLET ORAL
Qty: 105 TABLET | Refills: 3 | Status: SHIPPED | OUTPATIENT
Start: 2020-04-13 | End: 2020-04-14 | Stop reason: SDUPTHER

## 2020-04-13 RX ORDER — LANOLIN ALCOHOL/MO/W.PET/CERES
1000 CREAM (GRAM) TOPICAL DAILY
Qty: 90 TABLET | Refills: 2 | Status: SHIPPED | OUTPATIENT
Start: 2020-04-13 | End: 2020-04-13 | Stop reason: SDUPTHER

## 2020-04-13 RX ORDER — LANOLIN ALCOHOL/MO/W.PET/CERES
1000 CREAM (GRAM) TOPICAL DAILY
Qty: 90 TABLET | Refills: 2 | Status: SHIPPED | OUTPATIENT
Start: 2020-04-13

## 2020-04-14 ENCOUNTER — TELEPHONE (OUTPATIENT)
Dept: FAMILY MEDICINE CLINIC | Facility: CLINIC | Age: 50
End: 2020-04-14

## 2020-04-14 DIAGNOSIS — F32.A ANXIETY AND DEPRESSION: ICD-10-CM

## 2020-04-14 DIAGNOSIS — F41.9 ANXIETY AND DEPRESSION: ICD-10-CM

## 2020-04-14 RX ORDER — CLONAZEPAM 0.5 MG/1
TABLET ORAL
Qty: 105 TABLET | Refills: 3
Start: 2020-04-14 | End: 2020-08-13 | Stop reason: SDUPTHER

## 2020-04-14 RX ORDER — CLONAZEPAM 0.5 MG/1
TABLET ORAL
Qty: 15 TABLET | Refills: 0 | Status: SHIPPED | OUTPATIENT
Start: 2020-04-14 | End: 2020-04-14 | Stop reason: SDUPTHER

## 2020-04-14 NOTE — TELEPHONE ENCOUNTER
Maria Fernanda Quezada, Fito Rep 1 hour ago (8:55 AM)         Patient is calling back in regarding her   clonazePAM (KlonoPIN) 0.5 MG tablet  Dr Gamez rewrote the presciption for the correct amount of 105 count yesterday 04/13/20, but the pharmacy will not fill it because she had already filled the previous prescription. Patient states the pharmacy informed her that the Dr needs to send over a prescription for a count of 15 for them to be able to give her the months correct amount, due to her already have filled the first prescription.   PLEASE ADVISE  Patient can be called back at   [phone]     [facility] ON [address]   VERIFIED

## 2020-04-14 NOTE — TELEPHONE ENCOUNTER
Patient is calling back in regarding her   clonazePAM (KlonoPIN) 0.5 MG tablet  Dr Gamez rewrote the presciption for the correct amount of 105 count yesterday 04/13/20, but the pharmacy will not fill it because she had already filled the previous prescription. Patient states the pharmacy informed her that the Dr needs to send over a prescription for a count of 15 for them to be able to give her the months correct amount, due to her already have filled the first prescription.   PLEASE ADVISE  Patient can be called back at   [phone]    [facility] ON [address]   VERIFIED

## 2020-04-21 ENCOUNTER — TELEPHONE (OUTPATIENT)
Dept: FAMILY MEDICINE CLINIC | Facility: CLINIC | Age: 50
End: 2020-04-21

## 2020-04-21 NOTE — TELEPHONE ENCOUNTER
PT STATED THAT SHE HAS BEEN AROUND HER GRANDSON WHOSE GRANDFATHER TESTED POSITIVE FOR COVID-19 LAST NIGHT. PT STATED THAT SHE ALSO WATCHES HER GRANDSON AS WELL, PT HAS BEEN AROUND GRANDSON SINCE THE GRANDFATHER HAS BEEN DIAGNOSED, PT REQUESTED TO KNOW IF SHE NEEDS TO QUARANTINE.       PLEASE ADVISE

## 2020-04-21 NOTE — TELEPHONE ENCOUNTER
D/W pt by phone.  No direct exposure noted.  Grandson did have exposure but no symptoms.  Informed pt to monitor temp and if any symptoms then needs to quarantine.  No quarantine needed but recommended limiting exposure outside home and using face mask when outside.

## 2020-08-13 ENCOUNTER — OFFICE VISIT (OUTPATIENT)
Dept: FAMILY MEDICINE CLINIC | Facility: CLINIC | Age: 50
End: 2020-08-13

## 2020-08-13 VITALS
WEIGHT: 189 LBS | SYSTOLIC BLOOD PRESSURE: 101 MMHG | HEART RATE: 83 BPM | TEMPERATURE: 98 F | HEIGHT: 64 IN | DIASTOLIC BLOOD PRESSURE: 69 MMHG | OXYGEN SATURATION: 92 % | BODY MASS INDEX: 32.27 KG/M2

## 2020-08-13 DIAGNOSIS — F41.1 GENERALIZED ANXIETY DISORDER: Primary | ICD-10-CM

## 2020-08-13 DIAGNOSIS — E53.8 VITAMIN B12 DEFICIENCY: ICD-10-CM

## 2020-08-13 DIAGNOSIS — F51.01 PRIMARY INSOMNIA: ICD-10-CM

## 2020-08-13 DIAGNOSIS — K90.89 OTHER SPECIFIED INTESTINAL MALABSORPTION: ICD-10-CM

## 2020-08-13 DIAGNOSIS — F32.A ANXIETY AND DEPRESSION: ICD-10-CM

## 2020-08-13 DIAGNOSIS — F41.9 ANXIETY AND DEPRESSION: ICD-10-CM

## 2020-08-13 DIAGNOSIS — E55.9 VITAMIN D DEFICIENCY: ICD-10-CM

## 2020-08-13 DIAGNOSIS — K21.9 GERD WITHOUT ESOPHAGITIS: ICD-10-CM

## 2020-08-13 PROBLEM — K90.9 MALABSORPTION: Status: ACTIVE | Noted: 2020-08-13

## 2020-08-13 PROCEDURE — 99214 OFFICE O/P EST MOD 30 MIN: CPT | Performed by: FAMILY MEDICINE

## 2020-08-13 RX ORDER — CLONAZEPAM 0.5 MG/1
TABLET ORAL
Qty: 105 TABLET | Refills: 3 | Status: SHIPPED | OUTPATIENT
Start: 2020-08-13 | End: 2020-12-09 | Stop reason: SDUPTHER

## 2020-08-13 RX ORDER — BUPROPION HYDROCHLORIDE 150 MG/1
150 TABLET ORAL EVERY MORNING
Qty: 90 TABLET | Refills: 3 | Status: SHIPPED | OUTPATIENT
Start: 2020-08-13 | End: 2020-12-14 | Stop reason: SDUPTHER

## 2020-08-13 RX ORDER — BUPROPION HCL 100 MG
50 TABLET ORAL DAILY
COMMUNITY
End: 2020-08-13

## 2020-08-13 NOTE — PROGRESS NOTES
Chief Complaint   Patient presents with   • Anxiety       Subjective   Media ZINA Lopez is a 50 y.o. female.     History of Present Illness   F/U ARCENIO. Doing wel lwith buproprion.  No Se.  Doing well with clonazepam.    F/U insomnia.  The 3 clonazepams do well for me at night. CARLOS ALBERTO appropriate today.   F/U Ezequiel. Using PPI prn.    FU b12 def.  On B12 once a day.  No Se.  Stopped injections.    The following portions of the patient's history were reviewed and updated as appropriate: allergies, current medications, past family history, past medical history, past social history, past surgical history and problem list.    Review of Systems   Constitutional: Negative for appetite change and fatigue.   HENT: Negative for nosebleeds and sore throat.    Eyes: Negative for blurred vision and visual disturbance.   Respiratory: Negative for shortness of breath and wheezing.    Cardiovascular: Negative for chest pain and leg swelling.   Gastrointestinal: Negative for abdominal distention and abdominal pain.   Endocrine: Negative for cold intolerance and polyuria.   Genitourinary: Negative for dysuria and hematuria.   Musculoskeletal: Negative for arthralgias and myalgias.   Skin: Negative for color change and rash.   Neurological: Negative for weakness and confusion.   Psychiatric/Behavioral: Negative for agitation and depressed mood.       Patient Active Problem List   Diagnosis   • Back pain   • Anxiety and depression   • Goiter   • Fatigue   • Fibromyalgia   • Herpes zoster   • Insomnia   • Iron deficiency   • Myalgia   • Vitamin B12 deficiency   • Vitamin D deficiency   • History of MRSA infection   • Large bowel obstruction (CMS/HCC)   • Seasonal allergies   • GERD without esophagitis   • Hypertriglyceridemia   • Generalized anxiety disorder   • Malabsorption       No Known Allergies      Current Outpatient Medications:   •  Bioflavonoid Products (VITAMIN C) chewable tablet, Chew 240 mg 2 (Two) Times a Day., Disp: , Rfl:   •   Cholecalciferol (VITAMIN D3) 5000 UNITS capsule capsule, Take 5,000 Units by mouth daily., Disp: , Rfl:   •  clonazePAM (KlonoPIN) 0.5 MG tablet, 1/2 in AM AND Three AT NIGHT PRN ANXIETY/insomnia, Disp: 105 tablet, Rfl: 3  •  diclofenac (VOLTAREN) 1 % gel gel, Place 4 g on the skin as directed by provider 4 (Four) Times a Day., Disp: , Rfl:   •  magnesium gluconate (MAGONATE) 500 MG tablet, Take 27 mg by mouth Daily., Disp: , Rfl:   •  omeprazole OTC (PriLOSEC OTC) 20 MG EC tablet, Take 20 mg by mouth daily., Disp: , Rfl:   •  Pediatric Multiple Vit-C-FA (FLINSTONES GUMMIES OMEGA-3 DHA) chewable tablet, Chew 2 tablets Daily., Disp: , Rfl:   •  valACYclovir (VALTREX) 1000 MG tablet, Take 1,000 mg by mouth., Disp: , Rfl:   •  vitamin B-12 (CYANOCOBALAMIN) 1000 MCG tablet, Take 1 tablet by mouth Daily., Disp: 90 tablet, Rfl: 2  •  buPROPion XL (WELLBUTRIN XL) 150 MG 24 hr tablet, Take 1 tablet by mouth Every Morning., Disp: 90 tablet, Rfl: 3    Current Facility-Administered Medications:   •  cyanocobalamin injection 1,000 mcg, 1,000 mcg, Intramuscular, Q28 Days, Gamal Gamez MD, 1,000 mcg at 03/18/20 1555    Past Medical History:   Diagnosis Date   • Acute upper respiratory infection    • Anemia    • Anxiety    • Arthritis    • Depression    • External hemorrhoid, thrombosed    • Fibromyalgia    • Folliculitis    • ARCENIO (generalized anxiety disorder)    • Gastroparesis    • GERD (gastroesophageal reflux disease)    • GERD without esophagitis    • Heartburn    • Hemorrhoids    • High risk medication use    • History of staph infection     LAST TIME 4 YEARS AGO INSIDE RIGHT NARIS   • Hypertriglyceridemia    • Immunization deficiency    • Infected prosthetic mesh of abdominal wall (CMS/HCC)    • Insomnia    • MRSA carrier    • Persistent insomnia    • Pica    • PONV (postoperative nausea and vomiting)    • Shingles    • Strain of rectus abdominis muscle    • Vitamin B12 deficiency    • Volvulus of intestine  (CMS/HCC)        Past Surgical History:   Procedure Laterality Date   • ABDOMINOPLASTY     • BACK SURGERY  09/2015    Lower Back Surgery-by [facility]   • BARIATRIC SURGERY     • CHOLECYSTECTOMY     • COLECTOMY PARTIAL / TOTAL Right     Right Hemicolectomy   • COLONOSCOPY N/A 7/12/2016    Procedure: COLONOSCOPY into small bowel;  Surgeon: Riley Narayan MD;  Location: [facility] ENDOSCOPY;  Service:    • EXPLORATORY LAPAROTOMY N/A 4/20/2016    Procedure: LAPAROTOMY EXPLORATORY, POSSIBLE BOWEL RESECTION, POSSIBLE OSTOMY;  Surgeon: Riley Narayan MD;  Location: [facility] MAIN OR;  Service:    • EXPLORATORY LAPAROTOMY N/A 12/2/2016    Procedure: exploratory laparotomy, removal of  infected abdominal wall mesh. ;  Surgeon: Riley Narayan MD;  Location: [facility] MAIN OR;  Service:    • GASTRIC BYPASS  2000   • HIP ARTHROSCOPY W/ LABRAL REPAIR Left    • LAPAROSCOPIC TUBAL LIGATION     • OTHER SURGICAL HISTORY      LASER ON BACK   • UMBILICAL HERNIA REPAIR         Family History   Problem Relation Age of Onset   • Heart attack Father    • Diabetes Father    • Stroke Father    • Pancreatitis Father    • Diabetes Sister    • Breast cancer Maternal Grandmother    • Diabetes Paternal Grandmother    • Osteoporosis Paternal Grandmother    • Diabetes Paternal Grandfather    • Arthritis Mother    • No Known Problems Other        Social History     Tobacco Use   • Smoking status: Former Smoker   • Smokeless tobacco: Never Used   • Tobacco comment: smoked less than 1 pack per day for 10 year   Substance Use Topics   • Alcohol use: Yes     Comment: once every couple of months-7 or less drinks per week            Objective     Vitals:    08/13/20 1319   BP: 101/69   Pulse: 83   Temp: 98 °F (36.7 °C)   SpO2: 92%     Body mass index is 32.43 kg/m².    Physical Exam   Constitutional: She appears well-developed and well-nourished.   HENT:   Head: Normocephalic and atraumatic.   Mouth/Throat: Oropharynx  is clear and moist.   Eyes: Pupils are equal, round, and reactive to light. No scleral icterus.   Neck: No thyromegaly present.   Cardiovascular: Normal rate and regular rhythm. Exam reveals no gallop and no friction rub.   No murmur heard.  Pulmonary/Chest: Effort normal. No respiratory distress. She has no wheezes. She has no rales. She exhibits no tenderness.   Abdominal: Soft. Bowel sounds are normal. She exhibits no distension. There is no tenderness.   Musculoskeletal: Normal range of motion. She exhibits no edema or deformity.   Lymphadenopathy:     She has no cervical adenopathy.   Neurological: No cranial nerve deficit. She exhibits normal muscle tone.   Skin: Skin is warm and dry. No rash noted. She is not diaphoretic.   Vitals reviewed.      Lab Results   Component Value Date    GLUCOSE 91 12/04/2016    BUN 15 12/04/2016    CREATININE 0.64 12/04/2016    EGFRIFNONA 100 12/04/2016    EGFRIFAFRI 109 12/03/2015    BCR 23.4 12/04/2016    K 4.0 12/04/2016    CO2 22.9 12/04/2016    CALCIUM 8.1 (L) 12/04/2016    PROTENTOTREF 6.8 12/03/2015    ALBUMIN 4.40 04/19/2016    LABIL2 1.8 12/03/2015    AST 20 04/19/2016    ALT 13 04/19/2016       WBC   Date Value Ref Range Status   12/03/2016 12.16 (H) 4.50 - 10.70 10*3/mm3 Final     RBC   Date Value Ref Range Status   12/03/2016 3.43 (L) 3.90 - 5.20 10*6/mm3 Final     Hemoglobin   Date Value Ref Range Status   12/03/2016 9.1 (L) 11.9 - 15.5 g/dL Final     Hematocrit   Date Value Ref Range Status   12/03/2016 30.2 (L) 35.6 - 45.5 % Final     MCV   Date Value Ref Range Status   12/03/2016 88.0 80.5 - 98.2 fL Final     MCH   Date Value Ref Range Status   12/03/2016 26.5 (L) 26.9 - 32.0 pg Final     MCHC   Date Value Ref Range Status   12/03/2016 30.1 (L) 32.4 - 36.3 g/dL Final     RDW   Date Value Ref Range Status   12/03/2016 14.4 (H) 11.7 - 13.0 % Final     RDW-SD   Date Value Ref Range Status   12/03/2016 46.7 37.0 - 54.0 fl Final     MPV   Date Value Ref Range Status    12/03/2016 10.2 6.0 - 12.0 fL Final     Platelets   Date Value Ref Range Status   12/03/2016 277 140 - 500 10*3/mm3 Final     Neutrophil %   Date Value Ref Range Status   12/03/2016 83.8 (H) 42.7 - 76.0 % Final     Lymphocyte %   Date Value Ref Range Status   12/03/2016 7.9 (L) 19.6 - 45.3 % Final     Monocyte %   Date Value Ref Range Status   12/03/2016 5.7 5.0 - 12.0 % Final     Eosinophil %   Date Value Ref Range Status   12/03/2016 2.3 0.3 - 6.2 % Final     Basophil %   Date Value Ref Range Status   12/03/2016 0.1 0.0 - 1.5 % Final     Immature Grans %   Date Value Ref Range Status   12/03/2016 0.2 0.0 - 0.5 % Final     Neutrophils, Absolute   Date Value Ref Range Status   12/03/2016 10.19 (H) 1.90 - 8.10 10*3/mm3 Final     Lymphocytes, Absolute   Date Value Ref Range Status   12/03/2016 0.96 0.90 - 4.80 10*3/mm3 Final     Monocytes, Absolute   Date Value Ref Range Status   12/03/2016 0.69 0.20 - 1.20 10*3/mm3 Final     Eosinophils, Absolute   Date Value Ref Range Status   12/03/2016 0.28 0.00 - 0.70 10*3/mm3 Final     Basophils, Absolute   Date Value Ref Range Status   12/03/2016 0.01 0.00 - 0.20 10*3/mm3 Final     Immature Grans, Absolute   Date Value Ref Range Status   12/03/2016 0.03 0.00 - 0.03 10*3/mm3 Final     nRBC   Date Value Ref Range Status   12/03/2015 CANCELED       Comment:     Result canceled by the ancillary       No results found for: HGBA1C    No results found for: [id number]    TSH   Date Value Ref Range Status   12/03/2015 2.830 0.450 - 4.500 uIU/mL Final       No results found for: CHOL  No results found for: TRIG  No results found for: HDL  No results found for: LDL  No results found for: VLDL  No results found for: LDLHDL      Procedures    Assessment/Plan   Problems Addressed this Visit        Digestive    Vitamin B12 deficiency    Relevant Orders    Vitamin B12    Vitamin D deficiency    Relevant Orders    Vitamin D 1,25 Dihydroxy    GERD without esophagitis    Malabsorption     Relevant Orders    Comprehensive Metabolic Panel    CBC & Differential    Ferritin    Iron    Vitamin B12    Vitamin D 1,25 Dihydroxy    TSH Rfx On Abnormal To Free T4    Lipid Panel With / Chol / HDL Ratio       Other    Anxiety and depression    Relevant Medications    buPROPion XL (WELLBUTRIN XL) 150 MG 24 hr tablet    clonazePAM (KlonoPIN) 0.5 MG tablet    Insomnia    Generalized anxiety disorder - Primary    Relevant Medications    buPROPion XL (WELLBUTRIN XL) 150 MG 24 hr tablet        Continue PPI prn.    Orders Placed This Encounter   Procedures   • Comprehensive Metabolic Panel   • Ferritin   • Iron   • Vitamin B12   • Vitamin D 1,25 Dihydroxy   • TSH Rfx On Abnormal To Free T4   • Lipid Panel With / Chol / HDL Ratio   • CBC & Differential     Order Specific Question:   Manual Differential     Answer:   No       Current Outpatient Medications   Medication Sig Dispense Refill   • Bioflavonoid Products (VITAMIN C) chewable tablet Chew 240 mg 2 (Two) Times a Day.     • Cholecalciferol (VITAMIN D3) 5000 UNITS capsule capsule Take 5,000 Units by mouth daily.     • clonazePAM (KlonoPIN) 0.5 MG tablet 1/2 in AM AND Three AT NIGHT PRN ANXIETY/insomnia 105 tablet 3   • diclofenac (VOLTAREN) 1 % gel gel Place 4 g on the skin as directed by provider 4 (Four) Times a Day.     • magnesium gluconate (MAGONATE) 500 MG tablet Take 27 mg by mouth Daily.     • omeprazole OTC (PriLOSEC OTC) 20 MG EC tablet Take 20 mg by mouth daily.     • Pediatric Multiple Vit-C-FA (FLINSTONES GUMMIES OMEGA-3 DHA) chewable tablet Chew 2 tablets Daily.     • valACYclovir (VALTREX) 1000 MG tablet Take 1,000 mg by mouth.     • vitamin B-12 (CYANOCOBALAMIN) 1000 MCG tablet Take 1 tablet by mouth Daily. 90 tablet 2   • buPROPion XL (WELLBUTRIN XL) 150 MG 24 hr tablet Take 1 tablet by mouth Every Morning. 90 tablet 3     Current Facility-Administered Medications   Medication Dose Route Frequency Provider Last Rate Last Dose   • cyanocobalamin  injection 1,000 mcg  1,000 mcg Intramuscular Q28 Days Gamal Gamez MD   1,000 mcg at 03/18/20 1555       Juana Lopez had no medications administered during this visit.    Return in about 4 months (around 12/13/2020).    There are no Patient Instructions on file for this visit.

## 2020-08-17 LAB
1,25(OH)2D3 SERPL-MCNC: 42.9 PG/ML (ref 19.9–79.3)
ALBUMIN SERPL-MCNC: 4.4 G/DL (ref 3.5–5.2)
ALBUMIN/GLOB SERPL: 2.3 G/DL
ALP SERPL-CCNC: 94 U/L (ref 39–117)
ALT SERPL-CCNC: 13 U/L (ref 1–33)
AST SERPL-CCNC: 15 U/L (ref 1–32)
BASOPHILS # BLD AUTO: 0.06 10*3/MM3 (ref 0–0.2)
BASOPHILS NFR BLD AUTO: 0.8 % (ref 0–1.5)
BILIRUB SERPL-MCNC: 0.2 MG/DL (ref 0–1.2)
BUN SERPL-MCNC: 19 MG/DL (ref 6–20)
BUN/CREAT SERPL: 23.2 (ref 7–25)
CALCIUM SERPL-MCNC: 9.3 MG/DL (ref 8.6–10.5)
CHLORIDE SERPL-SCNC: 104 MMOL/L (ref 98–107)
CHOLEST SERPL-MCNC: 161 MG/DL (ref 0–200)
CHOLEST/HDLC SERPL: 2.52 {RATIO}
CO2 SERPL-SCNC: 22.8 MMOL/L (ref 22–29)
CREAT SERPL-MCNC: 0.82 MG/DL (ref 0.57–1)
EOSINOPHIL # BLD AUTO: 0.38 10*3/MM3 (ref 0–0.4)
EOSINOPHIL NFR BLD AUTO: 4.9 % (ref 0.3–6.2)
ERYTHROCYTE [DISTWIDTH] IN BLOOD BY AUTOMATED COUNT: 12.1 % (ref 12.3–15.4)
FERRITIN SERPL-MCNC: 16 NG/ML (ref 13–150)
GLOBULIN SER CALC-MCNC: 1.9 GM/DL
GLUCOSE SERPL-MCNC: 90 MG/DL (ref 65–99)
HCT VFR BLD AUTO: 38 % (ref 34–46.6)
HDLC SERPL-MCNC: 64 MG/DL (ref 40–60)
HGB BLD-MCNC: 12.5 G/DL (ref 12–15.9)
IMM GRANULOCYTES # BLD AUTO: 0.02 10*3/MM3 (ref 0–0.05)
IMM GRANULOCYTES NFR BLD AUTO: 0.3 % (ref 0–0.5)
IRON SERPL-MCNC: 82 MCG/DL (ref 37–145)
LDLC SERPL CALC-MCNC: 77 MG/DL (ref 0–100)
LYMPHOCYTES # BLD AUTO: 1.72 10*3/MM3 (ref 0.7–3.1)
LYMPHOCYTES NFR BLD AUTO: 22.3 % (ref 19.6–45.3)
MCH RBC QN AUTO: 30.9 PG (ref 26.6–33)
MCHC RBC AUTO-ENTMCNC: 32.9 G/DL (ref 31.5–35.7)
MCV RBC AUTO: 94.1 FL (ref 79–97)
MONOCYTES # BLD AUTO: 0.43 10*3/MM3 (ref 0.1–0.9)
MONOCYTES NFR BLD AUTO: 5.6 % (ref 5–12)
NEUTROPHILS # BLD AUTO: 5.1 10*3/MM3 (ref 1.7–7)
NEUTROPHILS NFR BLD AUTO: 66.1 % (ref 42.7–76)
NRBC BLD AUTO-RTO: 0 /100 WBC (ref 0–0.2)
PLATELET # BLD AUTO: 278 10*3/MM3 (ref 140–450)
POTASSIUM SERPL-SCNC: 4.5 MMOL/L (ref 3.5–5.2)
PROT SERPL-MCNC: 6.3 G/DL (ref 6–8.5)
RBC # BLD AUTO: 4.04 10*6/MM3 (ref 3.77–5.28)
SODIUM SERPL-SCNC: 138 MMOL/L (ref 136–145)
TRIGL SERPL-MCNC: 99 MG/DL (ref 0–150)
TSH SERPL DL<=0.005 MIU/L-ACNC: 1.66 UIU/ML (ref 0.27–4.2)
VIT B12 SERPL-MCNC: 1704 PG/ML (ref 211–946)
VLDLC SERPL CALC-MCNC: 19.8 MG/DL
WBC # BLD AUTO: 7.71 10*3/MM3 (ref 3.4–10.8)

## 2020-11-17 ENCOUNTER — TELEMEDICINE (OUTPATIENT)
Dept: FAMILY MEDICINE CLINIC | Facility: CLINIC | Age: 50
End: 2020-11-17

## 2020-11-17 ENCOUNTER — TELEPHONE (OUTPATIENT)
Dept: FAMILY MEDICINE CLINIC | Facility: CLINIC | Age: 50
End: 2020-11-17

## 2020-11-17 DIAGNOSIS — B02.9 HERPES ZOSTER WITHOUT COMPLICATION: Primary | ICD-10-CM

## 2020-11-17 PROCEDURE — 99213 OFFICE O/P EST LOW 20 MIN: CPT | Performed by: FAMILY MEDICINE

## 2020-11-17 RX ORDER — VALACYCLOVIR HYDROCHLORIDE 1 G/1
1000 TABLET, FILM COATED ORAL 3 TIMES DAILY
Qty: 21 TABLET | Refills: 0 | Status: SHIPPED | OUTPATIENT
Start: 2020-11-17 | End: 2021-11-02

## 2020-11-17 NOTE — PROGRESS NOTES
C/o shingles outbreak.    Subjective   Media ZINA Lopez is a 50 y.o. female.     History of Present Illness   Video visit due to covid.  No video access per patient.  Consent obtained.  8 minute visit.    C/o rash like shingles previously.  On the right leg noticed painful rash 1 day ago.  Noted on both sides of her right leg.  No itching.  no fever  The following portions of the patient's history were reviewed and updated as appropriate: allergies, current medications, past family history, past medical history, past social history, past surgical history and problem list.    Review of Systems   Constitutional: Negative for appetite change and fatigue.   HENT: Negative for nosebleeds and sore throat.    Eyes: Negative for blurred vision and visual disturbance.   Respiratory: Negative for shortness of breath and wheezing.    Cardiovascular: Negative for chest pain and leg swelling.   Gastrointestinal: Negative for abdominal distention and abdominal pain.   Endocrine: Negative for cold intolerance and polyuria.   Genitourinary: Negative for dysuria and hematuria.   Musculoskeletal: Negative for arthralgias and myalgias.   Skin: Positive for rash. Negative for color change.   Neurological: Negative for weakness and confusion.   Psychiatric/Behavioral: Negative for agitation and depressed mood.       Patient Active Problem List   Diagnosis   • Back pain   • Anxiety and depression   • Goiter   • Fatigue   • Fibromyalgia   • Herpes zoster   • Insomnia   • Iron deficiency   • Myalgia   • Vitamin B12 deficiency   • Vitamin D deficiency   • History of MRSA infection   • Large bowel obstruction (CMS/HCC)   • Seasonal allergies   • GERD without esophagitis   • Hypertriglyceridemia   • Generalized anxiety disorder   • Malabsorption       No Known Allergies      Current Outpatient Medications:   •  Bioflavonoid Products (VITAMIN C) chewable tablet, Chew 240 mg 2 (Two) Times a Day., Disp: , Rfl:   •  buPROPion XL (WELLBUTRIN XL) 150  MG 24 hr tablet, Take 1 tablet by mouth Every Morning., Disp: 90 tablet, Rfl: 3  •  Cholecalciferol (VITAMIN D3) 5000 UNITS capsule capsule, Take 5,000 Units by mouth daily., Disp: , Rfl:   •  clonazePAM (KlonoPIN) 0.5 MG tablet, 1/2 in AM AND Three AT NIGHT PRN ANXIETY/insomnia, Disp: 105 tablet, Rfl: 3  •  diclofenac (VOLTAREN) 1 % gel gel, Place 4 g on the skin as directed by provider 4 (Four) Times a Day., Disp: , Rfl:   •  magnesium gluconate (MAGONATE) 500 MG tablet, Take 27 mg by mouth Daily., Disp: , Rfl:   •  omeprazole OTC (PriLOSEC OTC) 20 MG EC tablet, Take 20 mg by mouth daily., Disp: , Rfl:   •  Pediatric Multiple Vit-C-FA (FLINSTONES GUMMIES OMEGA-3 DHA) chewable tablet, Chew 2 tablets Daily., Disp: , Rfl:   •  valACYclovir (Valtrex) 1000 MG tablet, Take 1 tablet by mouth 3 (Three) Times a Day., Disp: 21 tablet, Rfl: 0  •  vitamin B-12 (CYANOCOBALAMIN) 1000 MCG tablet, Take 1 tablet by mouth Daily., Disp: 90 tablet, Rfl: 2    Current Facility-Administered Medications:   •  cyanocobalamin injection 1,000 mcg, 1,000 mcg, Intramuscular, Q28 Days, Gamal Gamez MD, 1,000 mcg at 03/18/20 1555    Past Medical History:   Diagnosis Date   • Acute upper respiratory infection    • Anemia    • Anxiety    • Arthritis    • Depression    • External hemorrhoid, thrombosed    • Fibromyalgia    • Folliculitis    • ARCENIO (generalized anxiety disorder)    • Gastroparesis    • GERD (gastroesophageal reflux disease)    • GERD without esophagitis    • Heartburn    • Hemorrhoids    • High risk medication use    • History of staph infection     LAST TIME 4 YEARS AGO INSIDE RIGHT NARIS   • Hypertriglyceridemia    • Immunization deficiency    • Infected prosthetic mesh of abdominal wall (CMS/HCC)    • Insomnia    • MRSA carrier    • Persistent insomnia    • Pica    • PONV (postoperative nausea and vomiting)    • Shingles    • Strain of rectus abdominis muscle    • Vitamin B12 deficiency    • Volvulus of intestine (CMS/HCC)         Past Surgical History:   Procedure Laterality Date   • ABDOMINOPLASTY     • BACK SURGERY  09/2015    [facility]   • BARIATRIC SURGERY     • CHOLECYSTECTOMY     • COLECTOMY PARTIAL / TOTAL Right     Right Hemicolectomy   • COLONOSCOPY N/A 7/12/2016    Procedure: COLONOSCOPY into small bowel;  Surgeon: Riley Narayan MD;  Location: [facility] ENDOSCOPY;  Service:    • EXPLORATORY LAPAROTOMY N/A 4/20/2016    Procedure: LAPAROTOMY EXPLORATORY, POSSIBLE BOWEL RESECTION, POSSIBLE OSTOMY;  Surgeon: Riley Narayan MD;  Location: [facility] MAIN OR;  Service:    • EXPLORATORY LAPAROTOMY N/A 12/2/2016    Procedure: exploratory laparotomy, removal of  infected abdominal wall mesh. ;  Surgeon: Riley Narayan MD;  Location: [facility] MAIN OR;  Service:    • GASTRIC BYPASS  2000   • HIP ARTHROSCOPY W/ LABRAL REPAIR Left    • LAPAROSCOPIC TUBAL LIGATION     • OTHER SURGICAL HISTORY      LASER ON BACK   • UMBILICAL HERNIA REPAIR         Family History   Problem Relation Age of Onset   • Heart attack Father    • Diabetes Father    • Stroke Father    • Pancreatitis Father    • Diabetes Sister    • Breast cancer Maternal Grandmother    • Diabetes Paternal Grandmother    • Osteoporosis Paternal Grandmother    • Diabetes Paternal Grandfather    • Arthritis Mother    • No Known Problems Other        Social History     Tobacco Use   • Smoking status: Former Smoker   • Smokeless tobacco: Never Used   • Tobacco comment: smoked less than 1 pack per day for 10 year   Substance Use Topics   • Alcohol use: Yes     Comment: once every couple of months-7 or less drinks per week            Objective     There were no vitals filed for this visit.  There is no height or weight on file to calculate BMI.    Physical Exam  Pulmonary:      Breath sounds: Normal breath sounds.   Neurological:      Mental Status: She is oriented to person, place, and time.   Psychiatric:         Thought  Content: Thought content normal.         Judgment: Judgment normal.         Lab Results   Component Value Date    GLUCOSE 91 12/04/2016    BUN 19 08/13/2020    CREATININE 0.82 08/13/2020    EGFRIFNONA 74 08/13/2020    EGFRIFAFRI 90 08/13/2020    BCR 23.2 08/13/2020    K 4.5 08/13/2020    CO2 22.8 08/13/2020    CALCIUM 9.3 08/13/2020    PROTENTOTREF 6.3 08/13/2020    ALBUMIN 4.40 08/13/2020    LABIL2 2.3 08/13/2020    AST 15 08/13/2020    ALT 13 08/13/2020       WBC   Date Value Ref Range Status   08/13/2020 7.71 3.40 - 10.80 10*3/mm3 Final     RBC   Date Value Ref Range Status   08/13/2020 4.04 3.77 - 5.28 10*6/mm3 Final     Hemoglobin   Date Value Ref Range Status   08/13/2020 12.5 12.0 - 15.9 g/dL Final   12/03/2016 9.1 (L) 11.9 - 15.5 g/dL Final     Hematocrit   Date Value Ref Range Status   08/13/2020 38.0 34.0 - 46.6 % Final   12/03/2016 30.2 (L) 35.6 - 45.5 % Final     MCV   Date Value Ref Range Status   08/13/2020 94.1 79.0 - 97.0 fL Final   12/03/2016 88.0 80.5 - 98.2 fL Final     MCH   Date Value Ref Range Status   08/13/2020 30.9 26.6 - 33.0 pg Final   12/03/2016 26.5 (L) 26.9 - 32.0 pg Final     MCHC   Date Value Ref Range Status   08/13/2020 32.9 31.5 - 35.7 g/dL Final   12/03/2016 30.1 (L) 32.4 - 36.3 g/dL Final     RDW   Date Value Ref Range Status   08/13/2020 12.1 (L) 12.3 - 15.4 % Final   12/03/2016 14.4 (H) 11.7 - 13.0 % Final     RDW-SD   Date Value Ref Range Status   12/03/2016 46.7 37.0 - 54.0 fl Final     MPV   Date Value Ref Range Status   12/03/2016 10.2 6.0 - 12.0 fL Final     Platelets   Date Value Ref Range Status   08/13/2020 278 140 - 450 10*3/mm3 Final   12/03/2016 277 140 - 500 10*3/mm3 Final     Neutrophil Rel %   Date Value Ref Range Status   08/13/2020 66.1 42.7 - 76.0 % Final     Neutrophil %   Date Value Ref Range Status   12/03/2016 83.8 (H) 42.7 - 76.0 % Final     Lymphocyte Rel %   Date Value Ref Range Status   08/13/2020 22.3 19.6 - 45.3 % Final     Lymphocyte %   Date Value  Ref Range Status   12/03/2016 7.9 (L) 19.6 - 45.3 % Final     Monocyte Rel %   Date Value Ref Range Status   08/13/2020 5.6 5.0 - 12.0 % Final     Monocyte %   Date Value Ref Range Status   12/03/2016 5.7 5.0 - 12.0 % Final     Eosinophil Rel %   Date Value Ref Range Status   08/13/2020 4.9 0.3 - 6.2 % Final     Eosinophil %   Date Value Ref Range Status   12/03/2016 2.3 0.3 - 6.2 % Final     Basophil Rel %   Date Value Ref Range Status   08/13/2020 0.8 0.0 - 1.5 % Final     Basophil %   Date Value Ref Range Status   12/03/2016 0.1 0.0 - 1.5 % Final     Immature Grans %   Date Value Ref Range Status   12/03/2016 0.2 0.0 - 0.5 % Final     Neutrophils Absolute   Date Value Ref Range Status   08/13/2020 5.10 1.70 - 7.00 10*3/mm3 Final     Neutrophils, Absolute   Date Value Ref Range Status   12/03/2016 10.19 (H) 1.90 - 8.10 10*3/mm3 Final     Lymphocytes Absolute   Date Value Ref Range Status   08/13/2020 1.72 0.70 - 3.10 10*3/mm3 Final     Lymphocytes, Absolute   Date Value Ref Range Status   12/03/2016 0.96 0.90 - 4.80 10*3/mm3 Final     Monocytes Absolute   Date Value Ref Range Status   08/13/2020 0.43 0.10 - 0.90 10*3/mm3 Final     Monocytes, Absolute   Date Value Ref Range Status   12/03/2016 0.69 0.20 - 1.20 10*3/mm3 Final     Eosinophils Absolute   Date Value Ref Range Status   08/13/2020 0.38 0.00 - 0.40 10*3/mm3 Final     Eosinophils, Absolute   Date Value Ref Range Status   12/03/2016 0.28 0.00 - 0.70 10*3/mm3 Final     Basophils Absolute   Date Value Ref Range Status   08/13/2020 0.06 0.00 - 0.20 10*3/mm3 Final     Basophils, Absolute   Date Value Ref Range Status   12/03/2016 0.01 0.00 - 0.20 10*3/mm3 Final     Immature Grans, Absolute   Date Value Ref Range Status   12/03/2016 0.03 0.00 - 0.03 10*3/mm3 Final     nRBC   Date Value Ref Range Status   08/13/2020 0.0 0.0 - 0.2 /100 WBC Final       No results found for: HGBA1C    Lab Results   Component Value Date    DACTWIJB78 1,704 (H) 08/13/2020       TSH    Date Value Ref Range Status   08/13/2020 1.660 0.270 - 4.200 uIU/mL Final       No results found for: CHOL  Lab Results   Component Value Date    TRIG 99 08/13/2020     Lab Results   Component Value Date    HDL 64 (H) 08/13/2020     Lab Results   Component Value Date    LDL 77 08/13/2020     Lab Results   Component Value Date    VLDL 19.8 08/13/2020     No results found for: LDLHDL      Procedures    Assessment/Plan   Problems Addressed this Visit        Other    Herpes zoster - Primary    Relevant Medications    valACYclovir (Valtrex) 1000 MG tablet      Diagnoses       Codes Comments    Herpes zoster without complication    -  Primary ICD-10-CM: B02.9  ICD-9-CM: 053.9         If progression or worsening, needs to be seen.    No orders of the defined types were placed in this encounter.      Current Outpatient Medications   Medication Sig Dispense Refill   • Bioflavonoid Products (VITAMIN C) chewable tablet Chew 240 mg 2 (Two) Times a Day.     • buPROPion XL (WELLBUTRIN XL) 150 MG 24 hr tablet Take 1 tablet by mouth Every Morning. 90 tablet 3   • Cholecalciferol (VITAMIN D3) 5000 UNITS capsule capsule Take 5,000 Units by mouth daily.     • clonazePAM (KlonoPIN) 0.5 MG tablet 1/2 in AM AND Three AT NIGHT PRN ANXIETY/insomnia 105 tablet 3   • diclofenac (VOLTAREN) 1 % gel gel Place 4 g on the skin as directed by provider 4 (Four) Times a Day.     • magnesium gluconate (MAGONATE) 500 MG tablet Take 27 mg by mouth Daily.     • omeprazole OTC (PriLOSEC OTC) 20 MG EC tablet Take 20 mg by mouth daily.     • Pediatric Multiple Vit-C-FA (FLINSTONES GUMMIES OMEGA-3 DHA) chewable tablet Chew 2 tablets Daily.     • valACYclovir (Valtrex) 1000 MG tablet Take 1 tablet by mouth 3 (Three) Times a Day. 21 tablet 0   • vitamin B-12 (CYANOCOBALAMIN) 1000 MCG tablet Take 1 tablet by mouth Daily. 90 tablet 2     Current Facility-Administered Medications   Medication Dose Route Frequency Provider Last Rate Last Dose   • cyanocobalamin  injection 1,000 mcg  1,000 mcg Intramuscular Q28 Days Gamal Gamez MD   1,000 mcg at 03/18/20 9795       Juana Lopez had no medications administered during this visit.    No follow-ups on file.    There are no Patient Instructions on file for this visit.

## 2020-11-17 NOTE — TELEPHONE ENCOUNTER
PATIENT CALLED STATING SHE IS HAVING ANOTHER FLARE UP OF SHINGLES. SHE NOTED THAT DR. PALAFOX HAS SEEN HER MANY TIMES FOR THIS.    THE PATIENT IS WANTING TO KNOW IF DR. PALAFOX COULD CALL IN SOME MEDICATION FOR HER OR IF SHE ACTUALLY HAS TO COME IN FOR AN APPOINTMENT. SHE WOULD LIKE TO AVOID THE APPOINTMENT IF SHE CAN DUE TO COVID.    PLEASE ADVISE  [phone]     [facility]/pharmacy #[id number] - [place], [place] - [address] - [phone]  - [phone]   [phone]

## 2020-12-09 DIAGNOSIS — F32.A ANXIETY AND DEPRESSION: ICD-10-CM

## 2020-12-09 DIAGNOSIS — F41.9 ANXIETY AND DEPRESSION: ICD-10-CM

## 2020-12-09 RX ORDER — CLONAZEPAM 0.5 MG/1
TABLET ORAL
Qty: 105 TABLET | Refills: 3 | Status: SHIPPED | OUTPATIENT
Start: 2020-12-09 | End: 2021-03-24 | Stop reason: SDUPTHER

## 2020-12-09 NOTE — TELEPHONE ENCOUNTER
Caller: Juana Lopze    Relationship: Self    Best call back number: [phone]    Medication needed:   Requested Prescriptions     Pending Prescriptions Disp Refills   • clonazePAM (KlonoPIN) 0.5 MG tablet 105 tablet 3     Si/2 in AM AND Three AT NIGHT PRN ANXIETY/insomnia       When do you need the refill by: Tomorrow    What details did the patient provide when requesting the medication: Has VV Appt for Monday, but will run out of medication before then    Does the patient have less than a 3 day supply:  [x] Yes  [] No    What is the patient's preferred pharmacy: [facility]/PHARMACY #[id number] - [place], [place] - [address] - [phone] [facility] [phone] FX

## 2020-12-14 ENCOUNTER — TELEMEDICINE (OUTPATIENT)
Dept: FAMILY MEDICINE CLINIC | Facility: CLINIC | Age: 50
End: 2020-12-14

## 2020-12-14 DIAGNOSIS — F41.9 ANXIETY AND DEPRESSION: ICD-10-CM

## 2020-12-14 DIAGNOSIS — F51.01 PRIMARY INSOMNIA: Primary | ICD-10-CM

## 2020-12-14 DIAGNOSIS — F32.A ANXIETY AND DEPRESSION: ICD-10-CM

## 2020-12-14 DIAGNOSIS — K21.9 GERD WITHOUT ESOPHAGITIS: ICD-10-CM

## 2020-12-14 PROCEDURE — 99422 OL DIG E/M SVC 11-20 MIN: CPT | Performed by: FAMILY MEDICINE

## 2020-12-14 RX ORDER — BUPROPION HYDROCHLORIDE 150 MG/1
150 TABLET ORAL EVERY MORNING
Qty: 90 TABLET | Refills: 3
Start: 2020-12-14 | End: 2021-11-02

## 2020-12-14 NOTE — PROGRESS NOTES
F/U anxiety with depression.    Subjective   Media ZINA Lopez is a 50 y.o. female.     History of Present Illness   Telemedicine visit due to Covid.  Telephone visit due to no video access.  Consent obtained.  12-minute visit.  Follow-up depression with anxiety.  Patient states she is doing well with her meds despite 2 persons in the family with Covid 1 of which is on a ventilator in the hospital (father-in-law).  On Wellbutrin  in the morning and clonazepam 0.5 1 in the morning and 3 at night.  No sedation through the day.  Patient states she is functioning well on current medications.  Follow-up GERD.  Doing well with omeprazole.    Follow-up insomnia.  Doing well with clonazepam.  Jamel appropriate/responsible use noted. Reviewed today.  The following portions of the patient's history were reviewed and updated as appropriate: allergies, current medications, past family history, past medical history, past social history, past surgical history and problem list.    Review of Systems   Constitutional: Negative for appetite change and fatigue.   HENT: Negative for nosebleeds and sore throat.    Eyes: Negative for blurred vision and visual disturbance.   Respiratory: Negative for shortness of breath and wheezing.    Cardiovascular: Negative for chest pain and leg swelling.   Gastrointestinal: Negative for abdominal distention and abdominal pain.   Endocrine: Negative for cold intolerance and polyuria.   Genitourinary: Negative for dysuria and hematuria.   Musculoskeletal: Negative for arthralgias and myalgias.   Skin: Negative for color change and rash.   Neurological: Negative for weakness and confusion.   Psychiatric/Behavioral: Negative for agitation and depressed mood.       Patient Active Problem List   Diagnosis   • Back pain   • Anxiety and depression   • Goiter   • Fatigue   • Fibromyalgia   • Herpes zoster   • Insomnia   • Iron deficiency   • Myalgia   • Vitamin B12 deficiency   • Vitamin D deficiency   •  History of MRSA infection   • Large bowel obstruction (CMS/HCC)   • Seasonal allergies   • GERD without esophagitis   • Hypertriglyceridemia   • Generalized anxiety disorder   • Malabsorption       No Known Allergies      Current Outpatient Medications:   •  Bioflavonoid Products (VITAMIN C) chewable tablet, Chew 240 mg 2 (Two) Times a Day., Disp: , Rfl:   •  buPROPion XL (WELLBUTRIN XL) 150 MG 24 hr tablet, Take 1 tablet by mouth Every Morning., Disp: 90 tablet, Rfl: 3  •  Cholecalciferol (VITAMIN D3) 5000 UNITS capsule capsule, Take 5,000 Units by mouth daily., Disp: , Rfl:   •  clonazePAM (KlonoPIN) 0.5 MG tablet, 1/2 in AM AND Three AT NIGHT PRN ANXIETY/insomnia, Disp: 105 tablet, Rfl: 3  •  diclofenac (VOLTAREN) 1 % gel gel, Place 4 g on the skin as directed by provider 4 (Four) Times a Day., Disp: , Rfl:   •  magnesium gluconate (MAGONATE) 500 MG tablet, Take 27 mg by mouth Daily., Disp: , Rfl:   •  omeprazole OTC (PriLOSEC OTC) 20 MG EC tablet, Take 20 mg by mouth daily., Disp: , Rfl:   •  Pediatric Multiple Vit-C-FA (FLINSTONES GUMMIES OMEGA-3 DHA) chewable tablet, Chew 2 tablets Daily., Disp: , Rfl:   •  valACYclovir (Valtrex) 1000 MG tablet, Take 1 tablet by mouth 3 (Three) Times a Day., Disp: 21 tablet, Rfl: 0  •  vitamin B-12 (CYANOCOBALAMIN) 1000 MCG tablet, Take 1 tablet by mouth Daily., Disp: 90 tablet, Rfl: 2    Current Facility-Administered Medications:   •  cyanocobalamin injection 1,000 mcg, 1,000 mcg, Intramuscular, Q28 Days, Gamal Gamez MD, 1,000 mcg at 03/18/20 1555    Past Medical History:   Diagnosis Date   • Acute upper respiratory infection    • Anemia    • Anxiety    • Arthritis    • Depression    • External hemorrhoid, thrombosed    • Fibromyalgia    • Folliculitis    • ARCENIO (generalized anxiety disorder)    • Gastroparesis    • GERD (gastroesophageal reflux disease)    • GERD without esophagitis    • Heartburn    • Hemorrhoids    • High risk medication use    • History of staph  infection     LAST TIME 4 YEARS AGO INSIDE RIGHT NARIS   • Hypertriglyceridemia    • Immunization deficiency    • Infected prosthetic mesh of abdominal wall (CMS/HCC)    • Insomnia    • MRSA carrier    • Persistent insomnia    • Pica    • PONV (postoperative nausea and vomiting)    • Shingles    • Strain of rectus abdominis muscle    • Vitamin B12 deficiency    • Volvulus of intestine (CMS/HCC)        Past Surgical History:   Procedure Laterality Date   • ABDOMINOPLASTY     • BACK SURGERY  09/2015    [facility]by [facility]   • BARIATRIC SURGERY     • CHOLECYSTECTOMY     • COLECTOMY PARTIAL / TOTAL Right     Right Hemicolectomy   • COLONOSCOPY N/A 7/12/2016    Procedure: COLONOSCOPY into small bowel;  Surgeon: Riley Narayan MD;  Location: [facility] ENDOSCOPY;  Service:    • EXPLORATORY LAPAROTOMY N/A 4/20/2016    Procedure: LAPAROTOMY EXPLORATORY, POSSIBLE BOWEL RESECTION, POSSIBLE OSTOMY;  Surgeon: Riley Narayan MD;  Location: [facility] MAIN OR;  Service:    • EXPLORATORY LAPAROTOMY N/A 12/2/2016    Procedure: exploratory laparotomy, removal of  infected abdominal wall mesh. ;  Surgeon: Riley Narayan MD;  Location: [facility] MAIN OR;  Service:    • GASTRIC BYPASS  2000   • HIP ARTHROSCOPY W/ LABRAL REPAIR Left    • LAPAROSCOPIC TUBAL LIGATION     • OTHER SURGICAL HISTORY      LASER ON BACK   • UMBILICAL HERNIA REPAIR         Family History   Problem Relation Age of Onset   • Heart attack Father    • Diabetes Father    • Stroke Father    • Pancreatitis Father    • Diabetes Sister    • Breast cancer Maternal Grandmother    • Diabetes Paternal Grandmother    • Osteoporosis Paternal Grandmother    • Diabetes Paternal Grandfather    • Arthritis Mother    • No Known Problems Other        Social History     Tobacco Use   • Smoking status: Former Smoker   • Smokeless tobacco: Never Used   • Tobacco comment: smoked less than 1 pack per day for 10 year   Substance Use Topics    • Alcohol use: Yes     Comment: once every couple of months-7 or less drinks per week            Objective     There were no vitals filed for this visit.  There is no height or weight on file to calculate BMI.    Physical Exam  Pulmonary:      Breath sounds: Normal breath sounds.   Neurological:      Mental Status: She is oriented to person, place, and time.   Psychiatric:         Thought Content: Thought content normal.         Judgment: Judgment normal.         Lab Results   Component Value Date    GLUCOSE 91 12/04/2016    BUN 19 08/13/2020    CREATININE 0.82 08/13/2020    EGFRIFNONA 74 08/13/2020    EGFRIFAFRI 90 08/13/2020    BCR 23.2 08/13/2020    K 4.5 08/13/2020    CO2 22.8 08/13/2020    CALCIUM 9.3 08/13/2020    PROTENTOTREF 6.3 08/13/2020    ALBUMIN 4.40 08/13/2020    LABIL2 2.3 08/13/2020    AST 15 08/13/2020    ALT 13 08/13/2020       WBC   Date Value Ref Range Status   08/13/2020 7.71 3.40 - 10.80 10*3/mm3 Final     RBC   Date Value Ref Range Status   08/13/2020 4.04 3.77 - 5.28 10*6/mm3 Final     Hemoglobin   Date Value Ref Range Status   08/13/2020 12.5 12.0 - 15.9 g/dL Final   12/03/2016 9.1 (L) 11.9 - 15.5 g/dL Final     Hematocrit   Date Value Ref Range Status   08/13/2020 38.0 34.0 - 46.6 % Final   12/03/2016 30.2 (L) 35.6 - 45.5 % Final     MCV   Date Value Ref Range Status   08/13/2020 94.1 79.0 - 97.0 fL Final   12/03/2016 88.0 80.5 - 98.2 fL Final     MCH   Date Value Ref Range Status   08/13/2020 30.9 26.6 - 33.0 pg Final   12/03/2016 26.5 (L) 26.9 - 32.0 pg Final     MCHC   Date Value Ref Range Status   08/13/2020 32.9 31.5 - 35.7 g/dL Final   12/03/2016 30.1 (L) 32.4 - 36.3 g/dL Final     RDW   Date Value Ref Range Status   08/13/2020 12.1 (L) 12.3 - 15.4 % Final   12/03/2016 14.4 (H) 11.7 - 13.0 % Final     RDW-SD   Date Value Ref Range Status   12/03/2016 46.7 37.0 - 54.0 fl Final     MPV   Date Value Ref Range Status   12/03/2016 10.2 6.0 - 12.0 fL Final     Platelets   Date Value Ref  Range Status   08/13/2020 278 140 - 450 10*3/mm3 Final   12/03/2016 277 140 - 500 10*3/mm3 Final     Neutrophil Rel %   Date Value Ref Range Status   08/13/2020 66.1 42.7 - 76.0 % Final     Neutrophil %   Date Value Ref Range Status   12/03/2016 83.8 (H) 42.7 - 76.0 % Final     Lymphocyte Rel %   Date Value Ref Range Status   08/13/2020 22.3 19.6 - 45.3 % Final     Lymphocyte %   Date Value Ref Range Status   12/03/2016 7.9 (L) 19.6 - 45.3 % Final     Monocyte Rel %   Date Value Ref Range Status   08/13/2020 5.6 5.0 - 12.0 % Final     Monocyte %   Date Value Ref Range Status   12/03/2016 5.7 5.0 - 12.0 % Final     Eosinophil Rel %   Date Value Ref Range Status   08/13/2020 4.9 0.3 - 6.2 % Final     Eosinophil %   Date Value Ref Range Status   12/03/2016 2.3 0.3 - 6.2 % Final     Basophil Rel %   Date Value Ref Range Status   08/13/2020 0.8 0.0 - 1.5 % Final     Basophil %   Date Value Ref Range Status   12/03/2016 0.1 0.0 - 1.5 % Final     Immature Grans %   Date Value Ref Range Status   12/03/2016 0.2 0.0 - 0.5 % Final     Neutrophils Absolute   Date Value Ref Range Status   08/13/2020 5.10 1.70 - 7.00 10*3/mm3 Final     Neutrophils, Absolute   Date Value Ref Range Status   12/03/2016 10.19 (H) 1.90 - 8.10 10*3/mm3 Final     Lymphocytes Absolute   Date Value Ref Range Status   08/13/2020 1.72 0.70 - 3.10 10*3/mm3 Final     Lymphocytes, Absolute   Date Value Ref Range Status   12/03/2016 0.96 0.90 - 4.80 10*3/mm3 Final     Monocytes Absolute   Date Value Ref Range Status   08/13/2020 0.43 0.10 - 0.90 10*3/mm3 Final     Monocytes, Absolute   Date Value Ref Range Status   12/03/2016 0.69 0.20 - 1.20 10*3/mm3 Final     Eosinophils Absolute   Date Value Ref Range Status   08/13/2020 0.38 0.00 - 0.40 10*3/mm3 Final     Eosinophils, Absolute   Date Value Ref Range Status   12/03/2016 0.28 0.00 - 0.70 10*3/mm3 Final     Basophils Absolute   Date Value Ref Range Status   08/13/2020 0.06 0.00 - 0.20 10*3/mm3 Final      Basophils, Absolute   Date Value Ref Range Status   12/03/2016 0.01 0.00 - 0.20 10*3/mm3 Final     Immature Grans, Absolute   Date Value Ref Range Status   12/03/2016 0.03 0.00 - 0.03 10*3/mm3 Final     nRBC   Date Value Ref Range Status   08/13/2020 0.0 0.0 - 0.2 /100 WBC Final       No results found for: HGBA1C    Lab Results   Component Value Date    KAETLACM26 1,704 (H) 08/13/2020       TSH   Date Value Ref Range Status   08/13/2020 1.660 0.270 - 4.200 uIU/mL Final       No results found for: CHOL  Lab Results   Component Value Date    TRIG 99 08/13/2020     Lab Results   Component Value Date    HDL 64 (H) 08/13/2020     Lab Results   Component Value Date    LDL 77 08/13/2020     Lab Results   Component Value Date    VLDL 19.8 08/13/2020     No results found for: LDLHDL      Procedures    Assessment/Plan   Problems Addressed this Visit        Digestive    GERD without esophagitis       Other    Anxiety and depression    Relevant Medications    buPROPion XL (WELLBUTRIN XL) 150 MG 24 hr tablet    Insomnia - Primary      Diagnoses       Codes Comments    Primary insomnia    -  Primary ICD-10-CM: F51.01  ICD-9-CM: 307.42     Anxiety and depression     ICD-10-CM: F41.9, F32.9  ICD-9-CM: 300.00, 311     GERD without esophagitis     ICD-10-CM: K21.9  ICD-9-CM: 530.81         Continue clonazepam as taking.  Continue omeprazole.  No orders of the defined types were placed in this encounter.      Current Outpatient Medications   Medication Sig Dispense Refill   • Bioflavonoid Products (VITAMIN C) chewable tablet Chew 240 mg 2 (Two) Times a Day.     • buPROPion XL (WELLBUTRIN XL) 150 MG 24 hr tablet Take 1 tablet by mouth Every Morning. 90 tablet 3   • Cholecalciferol (VITAMIN D3) 5000 UNITS capsule capsule Take 5,000 Units by mouth daily.     • clonazePAM (KlonoPIN) 0.5 MG tablet 1/2 in AM AND Three AT NIGHT PRN ANXIETY/insomnia 105 tablet 3   • diclofenac (VOLTAREN) 1 % gel gel Place 4 g on the skin as directed by  provider 4 (Four) Times a Day.     • magnesium gluconate (MAGONATE) 500 MG tablet Take 27 mg by mouth Daily.     • omeprazole OTC (PriLOSEC OTC) 20 MG EC tablet Take 20 mg by mouth daily.     • Pediatric Multiple Vit-C-FA (FLINSTONES GUMMIES OMEGA-3 DHA) chewable tablet Chew 2 tablets Daily.     • valACYclovir (Valtrex) 1000 MG tablet Take 1 tablet by mouth 3 (Three) Times a Day. 21 tablet 0   • vitamin B-12 (CYANOCOBALAMIN) 1000 MCG tablet Take 1 tablet by mouth Daily. 90 tablet 2     Current Facility-Administered Medications   Medication Dose Route Frequency Provider Last Rate Last Admin   • cyanocobalamin injection 1,000 mcg  1,000 mcg Intramuscular Q28 Days Gamal Gamez MD   1,000 mcg at 03/18/20 1555       Juana Lopez had no medications administered during this visit.    Return in about 4 months (around 4/14/2021).    There are no Patient Instructions on file for this visit.

## 2021-03-24 ENCOUNTER — TELEPHONE (OUTPATIENT)
Dept: FAMILY MEDICINE CLINIC | Facility: CLINIC | Age: 51
End: 2021-03-24

## 2021-03-24 DIAGNOSIS — F32.A ANXIETY AND DEPRESSION: ICD-10-CM

## 2021-03-24 DIAGNOSIS — F41.9 ANXIETY AND DEPRESSION: ICD-10-CM

## 2021-03-24 RX ORDER — CLONAZEPAM 0.5 MG/1
TABLET ORAL
Qty: 105 TABLET | Refills: 3 | Status: SHIPPED | OUTPATIENT
Start: 2021-03-24 | End: 2021-07-06 | Stop reason: SDUPTHER

## 2021-03-24 NOTE — TELEPHONE ENCOUNTER
Caller: Juana Lopez    Relationship: Self    Best call back number: [phone]    Medication needed:   Requested Prescriptions     Pending Prescriptions Disp Refills   • clonazePAM (KlonoPIN) 0.5 MG tablet 105 tablet 3     Si/2 in AM AND Three AT NIGHT PRN ANXIETY/insomnia       When do you need the refill by: 21    What additional details did the patient provide when requesting the medication: PATIENT STATED THAT SHE CURRENTLY HAS ENOUGH OF THIS MEDICATION LEFT TO GET HER THROUGH UNTIL 21.    Does the patient have less than a 3 day supply:  [] Yes  [x] No    What is the patient's preferred pharmacy: [facility] #[id number] - [place], [place] - [address] - [phone] [facility] [phone] FX

## 2021-04-27 ENCOUNTER — APPOINTMENT (OUTPATIENT)
Dept: WOMENS IMAGING | Facility: HOSPITAL | Age: 51
End: 2021-04-27

## 2021-04-27 PROCEDURE — 77063 BREAST TOMOSYNTHESIS BI: CPT | Performed by: RADIOLOGY

## 2021-04-27 PROCEDURE — 77067 SCR MAMMO BI INCL CAD: CPT | Performed by: RADIOLOGY

## 2021-07-06 ENCOUNTER — TELEPHONE (OUTPATIENT)
Dept: FAMILY MEDICINE CLINIC | Facility: CLINIC | Age: 51
End: 2021-07-06

## 2021-07-06 DIAGNOSIS — F41.9 ANXIETY AND DEPRESSION: ICD-10-CM

## 2021-07-06 DIAGNOSIS — F32.A ANXIETY AND DEPRESSION: ICD-10-CM

## 2021-07-06 RX ORDER — CLONAZEPAM 0.5 MG/1
TABLET ORAL
Qty: 105 TABLET | Refills: 0 | Status: SHIPPED | OUTPATIENT
Start: 2021-07-06 | End: 2021-08-17 | Stop reason: SDUPTHER

## 2021-07-06 NOTE — TELEPHONE ENCOUNTER
Patient scheduled 4/22/2021 in a Telehealth slot due to patient's pcoming vacation starting 7/23/2021 and Dr Gamez being out of office until 7/22/2021. Please advise if patient needs to be moved from this appointment time.

## 2021-07-06 NOTE — TELEPHONE ENCOUNTER
Caller: Juana Lopez    Relationship to patient: Self    Best call back number: [phone]    Patient is needing: PATIENT NEEDS TO KNOW IF SHE WILL NEED TO BE SEEN BEFORE HER NEXT REFILL OF THE clonazePAM (KlonoPIN) 0.5 MG tablet, IF SO, PLEASE REACH OUT TO PATIENT, SHE WILL RUN OUT WHILE ON VACATION SO SHE WANTS TO GO AHEAD AND GET IT TAKEN CARE OF BEFORE SHE GOES.

## 2021-07-06 NOTE — TELEPHONE ENCOUNTER
Tell her I sent over one more month, but that is all I can do until seen in person.  Tell her to make an appointment.

## 2021-08-17 DIAGNOSIS — F32.A ANXIETY AND DEPRESSION: ICD-10-CM

## 2021-08-17 DIAGNOSIS — F41.9 ANXIETY AND DEPRESSION: ICD-10-CM

## 2021-08-17 RX ORDER — CLONAZEPAM 0.5 MG/1
TABLET ORAL
Qty: 105 TABLET | Refills: 0 | Status: SHIPPED | OUTPATIENT
Start: 2021-08-17 | End: 2021-09-13 | Stop reason: SDUPTHER

## 2021-08-17 RX ORDER — MEDROXYPROGESTERONE ACETATE 2.5 MG/1
2.5 TABLET ORAL DAILY
COMMUNITY
Start: 2021-07-01

## 2021-08-17 RX ORDER — PHENTERMINE HYDROCHLORIDE 37.5 MG/1
37.5 TABLET ORAL EVERY MORNING
COMMUNITY
Start: 2021-06-15

## 2021-08-17 RX ORDER — ESTRADIOL 0.05 MG/D
FILM, EXTENDED RELEASE TRANSDERMAL
COMMUNITY
Start: 2021-07-02

## 2021-08-17 NOTE — TELEPHONE ENCOUNTER
Caller: Juana Lopez    Relationship: Self    Best call back number: [phone]    Medication needed:   Requested Prescriptions     Pending Prescriptions Disp Refills   • clonazePAM (KlonoPIN) 0.5 MG tablet 105 tablet 0     Si/2 in AM AND Three AT NIGHT PRN ANXIETY/insomnia       When do you need the refill by: ASAP    What additional details did the patient provide when requesting the medication: PATIENT STATES SHE WILL BE OUT 2021, HAS APPOINTMENT SCHEDULED FOR 2021    Does the patient have less than a 3 day supply:  [x] Yes  [] No    What is the patient's preferred pharmacy: [id number] - [place], [place] - [address] - [phone] [facility] [phone] FX

## 2021-08-23 ENCOUNTER — TELEMEDICINE (OUTPATIENT)
Dept: FAMILY MEDICINE CLINIC | Facility: CLINIC | Age: 51
End: 2021-08-23

## 2021-08-23 DIAGNOSIS — F41.1 GENERALIZED ANXIETY DISORDER: ICD-10-CM

## 2021-08-23 DIAGNOSIS — K52.9 GASTROENTERITIS: Primary | ICD-10-CM

## 2021-08-23 DIAGNOSIS — Z79.899 HIGH RISK MEDICATION USE: ICD-10-CM

## 2021-08-23 PROCEDURE — 99214 OFFICE O/P EST MOD 30 MIN: CPT | Performed by: FAMILY MEDICINE

## 2021-08-23 NOTE — PROGRESS NOTES
C/o nausea and emesis.       Subjective   Media ZINA Lopez is a 51 y.o. female.     History of Present Illness   Telemedicine visit due to covid.  Consent obtained.  17 minute visit.   C/o 2 day history of nausea with emesis.  Ate at [facility] 11 hours before.  Some diarrhea.  No fever.  No hematochezia.    F/U ARCENIO. Doing well with clonazepam.  No Se.    The following portions of the patient's history were reviewed and updated as appropriate: allergies, current medications, past family history, past medical history, past social history, past surgical history and problem list.    Review of Systems   Constitutional: Negative for appetite change and fatigue.   HENT: Negative for nosebleeds and sore throat.    Eyes: Negative for blurred vision and visual disturbance.   Respiratory: Negative for shortness of breath and wheezing.    Cardiovascular: Negative for chest pain and leg swelling.   Gastrointestinal: Positive for nausea and vomiting. Negative for abdominal distention and abdominal pain.   Endocrine: Negative for cold intolerance and polyuria.   Genitourinary: Negative for dysuria and hematuria.   Musculoskeletal: Negative for arthralgias and myalgias.   Skin: Negative for color change and rash.   Neurological: Negative for weakness and confusion.   Psychiatric/Behavioral: Negative for agitation and depressed mood.       Patient Active Problem List   Diagnosis   • Back pain   • Anxiety and depression   • Goiter   • Fatigue   • Fibromyalgia   • Herpes zoster   • Insomnia   • Iron deficiency   • Myalgia   • Vitamin B12 deficiency   • Vitamin D deficiency   • History of MRSA infection   • Large bowel obstruction (CMS/HCC)   • Seasonal allergies   • GERD without esophagitis   • Hypertriglyceridemia   • Generalized anxiety disorder   • Malabsorption   • Gastroenteritis   • High risk medication use       No Known Allergies      Current Outpatient Medications:   •  Bioflavonoid Products (VITAMIN C) chewable tablet,  Chew 240 mg 2 (Two) Times a Day., Disp: , Rfl:   •  buPROPion XL (WELLBUTRIN XL) 150 MG 24 hr tablet, Take 1 tablet by mouth Every Morning., Disp: 90 tablet, Rfl: 3  •  Cholecalciferol (VITAMIN D3) 5000 UNITS capsule capsule, Take 5,000 Units by mouth daily., Disp: , Rfl:   •  clonazePAM (KlonoPIN) 0.5 MG tablet, 1/2 in AM AND Three AT NIGHT PRN ANXIETY/insomnia, Disp: 105 tablet, Rfl: 0  •  diclofenac (VOLTAREN) 1 % gel gel, Place 4 g on the skin as directed by provider 4 (Four) Times a Day., Disp: , Rfl:   •  estradiol (MINIVELLE, VIVELLE-DOT) 0.05 MG/24HR patch, APPLY ONE PATCH TO SKIN TWO TIMES A WEEK, Disp: , Rfl:   •  magnesium gluconate (MAGONATE) 500 MG tablet, Take 27 mg by mouth Daily., Disp: , Rfl:   •  medroxyPROGESTERone (PROVERA) 2.5 MG tablet, Take 2.5 mg by mouth Daily., Disp: , Rfl:   •  omeprazole OTC (PriLOSEC OTC) 20 MG EC tablet, Take 20 mg by mouth daily., Disp: , Rfl:   •  Pediatric Multiple Vit-C-FA (FLINSTONES GUMMIES OMEGA-3 DHA) chewable tablet, Chew 2 tablets Daily., Disp: , Rfl:   •  phentermine (ADIPEX-P) 37.5 MG tablet, Take 37.5 mg by mouth Every Morning., Disp: , Rfl:   •  valACYclovir (Valtrex) 1000 MG tablet, Take 1 tablet by mouth 3 (Three) Times a Day., Disp: 21 tablet, Rfl: 0  •  vitamin B-12 (CYANOCOBALAMIN) 1000 MCG tablet, Take 1 tablet by mouth Daily., Disp: 90 tablet, Rfl: 2    Current Facility-Administered Medications:   •  cyanocobalamin injection 1,000 mcg, 1,000 mcg, Intramuscular, Q28 Days, Gamal Gamez MD, 1,000 mcg at 03/18/20 1555    Past Medical History:   Diagnosis Date   • Acute upper respiratory infection    • Anemia    • Anxiety    • Arthritis    • Depression    • External hemorrhoid, thrombosed    • Fibromyalgia    • Folliculitis    • ARCENIO (generalized anxiety disorder)    • Gastroparesis    • GERD (gastroesophageal reflux disease)    • GERD without esophagitis    • Heartburn    • Hemorrhoids    • High risk medication use    • History of staph infection      LAST TIME 4 YEARS AGO INSIDE RIGHT NARIS   • Hypertriglyceridemia    • Immunization deficiency    • Infected prosthetic mesh of abdominal wall (CMS/HCC)    • Insomnia    • MRSA carrier    • Persistent insomnia    • Pica    • PONV (postoperative nausea and vomiting)    • Shingles    • Strain of rectus abdominis muscle    • Vitamin B12 deficiency    • Volvulus of intestine (CMS/HCC)        Past Surgical History:   Procedure Laterality Date   • ABDOMINOPLASTY     • BACK SURGERY  09/2015    [facility] Back Surgery[facility]by [facility]   • BARIATRIC SURGERY     • CHOLECYSTECTOMY     • COLECTOMY PARTIAL / TOTAL Right     Right Hemicolectomy   • COLONOSCOPY N/A 7/12/2016    Procedure: COLONOSCOPY into small bowel;  Surgeon: Riley Narayan MD;  Location: [facility] ENDOSCOPY;  Service:    • EXPLORATORY LAPAROTOMY N/A 4/20/2016    Procedure: LAPAROTOMY EXPLORATORY, POSSIBLE BOWEL RESECTION, POSSIBLE OSTOMY;  Surgeon: Riley Narayan MD;  Location: [facility] MAIN OR;  Service:    • EXPLORATORY LAPAROTOMY N/A 12/2/2016    Procedure: exploratory laparotomy, removal of  infected abdominal wall mesh. ;  Surgeon: Riley Narayan MD;  Location: [facility] MAIN OR;  Service:    • GASTRIC BYPASS  2000   • HIP ARTHROSCOPY W/ LABRAL REPAIR Left    • LAPAROSCOPIC TUBAL LIGATION     • OTHER SURGICAL HISTORY      LASER ON BACK   • UMBILICAL HERNIA REPAIR         Family History   Problem Relation Age of Onset   • Heart attack Father    • Diabetes Father    • Stroke Father    • Pancreatitis Father    • Diabetes Sister    • Breast cancer Maternal Grandmother    • Diabetes Paternal Grandmother    • Osteoporosis Paternal Grandmother    • Diabetes Paternal Grandfather    • Arthritis Mother    • No Known Problems Other        Social History     Tobacco Use   • Smoking status: Former Smoker   • Smokeless tobacco: Never Used   • Tobacco comment: smoked less than 1 pack per day for 10 year   Substance Use Topics   • Alcohol  use: Yes     Comment: once every couple of months-7 or less drinks per week            Objective     There were no vitals filed for this visit.  There is no height or weight on file to calculate BMI.    Physical Exam  Neurological:      Mental Status: She is oriented to person, place, and time.   Psychiatric:         Thought Content: Thought content normal.         Judgment: Judgment normal.         Lab Results   Component Value Date    GLUCOSE 91 12/04/2016    BUN 19 08/13/2020    CREATININE 0.82 08/13/2020    EGFRIFNONA 74 08/13/2020    EGFRIFAFRI 90 08/13/2020    BCR 23.2 08/13/2020    K 4.5 08/13/2020    CO2 22.8 08/13/2020    CALCIUM 9.3 08/13/2020    PROTENTOTREF 6.3 08/13/2020    ALBUMIN 4.40 08/13/2020    LABIL2 2.3 08/13/2020    AST 15 08/13/2020    ALT 13 08/13/2020       WBC   Date Value Ref Range Status   08/13/2020 7.71 3.40 - 10.80 10*3/mm3 Final     RBC   Date Value Ref Range Status   08/13/2020 4.04 3.77 - 5.28 10*6/mm3 Final     Hemoglobin   Date Value Ref Range Status   08/13/2020 12.5 12.0 - 15.9 g/dL Final   12/03/2016 9.1 (L) 11.9 - 15.5 g/dL Final     Hematocrit   Date Value Ref Range Status   08/13/2020 38.0 34.0 - 46.6 % Final   12/03/2016 30.2 (L) 35.6 - 45.5 % Final     MCV   Date Value Ref Range Status   08/13/2020 94.1 79.0 - 97.0 fL Final   12/03/2016 88.0 80.5 - 98.2 fL Final     MCH   Date Value Ref Range Status   08/13/2020 30.9 26.6 - 33.0 pg Final   12/03/2016 26.5 (L) 26.9 - 32.0 pg Final     MCHC   Date Value Ref Range Status   08/13/2020 32.9 31.5 - 35.7 g/dL Final   12/03/2016 30.1 (L) 32.4 - 36.3 g/dL Final     RDW   Date Value Ref Range Status   08/13/2020 12.1 (L) 12.3 - 15.4 % Final   12/03/2016 14.4 (H) 11.7 - 13.0 % Final     RDW-SD   Date Value Ref Range Status   12/03/2016 46.7 37.0 - 54.0 fl Final     MPV   Date Value Ref Range Status   12/03/2016 10.2 6.0 - 12.0 fL Final     Platelets   Date Value Ref Range Status   08/13/2020 278 140 - 450 10*3/mm3 Final   12/03/2016  277 140 - 500 10*3/mm3 Final     Neutrophil Rel %   Date Value Ref Range Status   08/13/2020 66.1 42.7 - 76.0 % Final     Neutrophil %   Date Value Ref Range Status   12/03/2016 83.8 (H) 42.7 - 76.0 % Final     Lymphocyte Rel %   Date Value Ref Range Status   08/13/2020 22.3 19.6 - 45.3 % Final     Lymphocyte %   Date Value Ref Range Status   12/03/2016 7.9 (L) 19.6 - 45.3 % Final     Monocyte Rel %   Date Value Ref Range Status   08/13/2020 5.6 5.0 - 12.0 % Final     Monocyte %   Date Value Ref Range Status   12/03/2016 5.7 5.0 - 12.0 % Final     Eosinophil Rel %   Date Value Ref Range Status   08/13/2020 4.9 0.3 - 6.2 % Final     Eosinophil %   Date Value Ref Range Status   12/03/2016 2.3 0.3 - 6.2 % Final     Basophil Rel %   Date Value Ref Range Status   08/13/2020 0.8 0.0 - 1.5 % Final     Basophil %   Date Value Ref Range Status   12/03/2016 0.1 0.0 - 1.5 % Final     Immature Grans %   Date Value Ref Range Status   12/03/2016 0.2 0.0 - 0.5 % Final     Neutrophils Absolute   Date Value Ref Range Status   08/13/2020 5.10 1.70 - 7.00 10*3/mm3 Final     Neutrophils, Absolute   Date Value Ref Range Status   12/03/2016 10.19 (H) 1.90 - 8.10 10*3/mm3 Final     Lymphocytes Absolute   Date Value Ref Range Status   08/13/2020 1.72 0.70 - 3.10 10*3/mm3 Final     Lymphocytes, Absolute   Date Value Ref Range Status   12/03/2016 0.96 0.90 - 4.80 10*3/mm3 Final     Monocytes Absolute   Date Value Ref Range Status   08/13/2020 0.43 0.10 - 0.90 10*3/mm3 Final     Monocytes, Absolute   Date Value Ref Range Status   12/03/2016 0.69 0.20 - 1.20 10*3/mm3 Final     Eosinophils Absolute   Date Value Ref Range Status   08/13/2020 0.38 0.00 - 0.40 10*3/mm3 Final     Eosinophils, Absolute   Date Value Ref Range Status   12/03/2016 0.28 0.00 - 0.70 10*3/mm3 Final     Basophils Absolute   Date Value Ref Range Status   08/13/2020 0.06 0.00 - 0.20 10*3/mm3 Final     Basophils, Absolute   Date Value Ref Range Status   12/03/2016 0.01 0.00 -  0.20 10*3/mm3 Final     Immature Grans, Absolute   Date Value Ref Range Status   12/03/2016 0.03 0.00 - 0.03 10*3/mm3 Final     nRBC   Date Value Ref Range Status   08/13/2020 0.0 0.0 - 0.2 /100 WBC Final       No results found for: HGBA1C    Lab Results   Component Value Date    OXYSRHEA05 1,704 (H) 08/13/2020       TSH   Date Value Ref Range Status   08/13/2020 1.660 0.270 - 4.200 uIU/mL Final       No results found for: CHOL  Lab Results   Component Value Date    TRIG 99 08/13/2020     Lab Results   Component Value Date    HDL 64 (H) 08/13/2020     Lab Results   Component Value Date    LDL 77 08/13/2020     Lab Results   Component Value Date    VLDL 19.8 08/13/2020     No results found for: LDLHDL      Procedures    Assessment/Plan   Problems Addressed this Visit     Gastroenteritis - Primary    Generalized anxiety disorder    High risk medication use      Diagnoses       Codes Comments    Gastroenteritis    -  Primary ICD-10-CM: K52.9  ICD-9-CM: 558.9     Generalized anxiety disorder     ICD-10-CM: F41.1  ICD-9-CM: 300.02     High risk medication use     ICD-10-CM: Z79.899  ICD-9-CM: V58.69       Gastroenteritis.  Improved.  Push fluids as likely viral.    ARCENIO with high risk med.   Controlled. Rf clonazepam when due as noted responsible use in [facility].       No orders of the defined types were placed in this encounter.      Current Outpatient Medications   Medication Sig Dispense Refill   • Bioflavonoid Products (VITAMIN C) chewable tablet Chew 240 mg 2 (Two) Times a Day.     • buPROPion XL (WELLBUTRIN XL) 150 MG 24 hr tablet Take 1 tablet by mouth Every Morning. 90 tablet 3   • Cholecalciferol (VITAMIN D3) 5000 UNITS capsule capsule Take 5,000 Units by mouth daily.     • clonazePAM (KlonoPIN) 0.5 MG tablet 1/2 in AM AND Three AT NIGHT PRN ANXIETY/insomnia 105 tablet 0   • diclofenac (VOLTAREN) 1 % gel gel Place 4 g on the skin as directed by provider 4 (Four) Times a Day.     • estradiol (MINIVELLE, VIVELLE-DOT)  0.05 MG/24HR patch APPLY ONE PATCH TO SKIN TWO TIMES A WEEK     • magnesium gluconate (MAGONATE) 500 MG tablet Take 27 mg by mouth Daily.     • medroxyPROGESTERone (PROVERA) 2.5 MG tablet Take 2.5 mg by mouth Daily.     • omeprazole OTC (PriLOSEC OTC) 20 MG EC tablet Take 20 mg by mouth daily.     • Pediatric Multiple Vit-C-FA (FLINSTONES GUMMIES OMEGA-3 DHA) chewable tablet Chew 2 tablets Daily.     • phentermine (ADIPEX-P) 37.5 MG tablet Take 37.5 mg by mouth Every Morning.     • valACYclovir (Valtrex) 1000 MG tablet Take 1 tablet by mouth 3 (Three) Times a Day. 21 tablet 0   • vitamin B-12 (CYANOCOBALAMIN) 1000 MCG tablet Take 1 tablet by mouth Daily. 90 tablet 2     Current Facility-Administered Medications   Medication Dose Route Frequency Provider Last Rate Last Admin   • cyanocobalamin injection 1,000 mcg  1,000 mcg Intramuscular Q28 Days Gamal Gamez MD   1,000 mcg at 03/18/20 [facility]       Juana Lopez had no medications administered during this visit.    Return in about 3 months (around 11/23/2021).    There are no Patient Instructions on file for this visit.

## 2021-09-13 DIAGNOSIS — F32.A ANXIETY AND DEPRESSION: ICD-10-CM

## 2021-09-13 DIAGNOSIS — F41.9 ANXIETY AND DEPRESSION: ICD-10-CM

## 2021-09-13 RX ORDER — CLONAZEPAM 0.5 MG/1
TABLET ORAL
Qty: 105 TABLET | Refills: 1 | Status: SHIPPED | OUTPATIENT
Start: 2021-09-13 | End: 2021-11-02 | Stop reason: SDUPTHER

## 2021-09-13 NOTE — TELEPHONE ENCOUNTER
Caller: Juana Lopez    Relationship: Self    Best call back number: [phone]    Medication needed:   Requested Prescriptions     Pending Prescriptions Disp Refills   • clonazePAM (KlonoPIN) 0.5 MG tablet 105 tablet 0     Si/2 in AM AND Three AT NIGHT PRN ANXIETY/insomnia       When do you need the refill by:     Does the patient have less than a 3 day supply:  [] Yes  [x] No    What is the patient's preferred pharmacy: [id number] - [place], [place] - [address]  [phone] [facility] [phone]

## 2021-09-13 NOTE — TELEPHONE ENCOUNTER
Rx Refill Note  Requested Prescriptions     Pending Prescriptions Disp Refills   • clonazePAM (KlonoPIN) 0.5 MG tablet 105 tablet 0     Si/2 in AM AND Three AT NIGHT PRN ANXIETY/insomnia      Last office visit with prescribing clinician: 2020      Next office visit with prescribing clinician: due 2021          Last filled            Daniela Hurst MA  21, 15:56 EDT

## 2021-11-02 ENCOUNTER — TELEMEDICINE (OUTPATIENT)
Dept: FAMILY MEDICINE CLINIC | Facility: CLINIC | Age: 51
End: 2021-11-02

## 2021-11-02 DIAGNOSIS — F41.9 ANXIETY AND DEPRESSION: ICD-10-CM

## 2021-11-02 DIAGNOSIS — K21.9 GERD WITHOUT ESOPHAGITIS: ICD-10-CM

## 2021-11-02 DIAGNOSIS — F32.A ANXIETY AND DEPRESSION: ICD-10-CM

## 2021-11-02 DIAGNOSIS — F41.1 GENERALIZED ANXIETY DISORDER: Primary | ICD-10-CM

## 2021-11-02 PROCEDURE — 99214 OFFICE O/P EST MOD 30 MIN: CPT | Performed by: FAMILY MEDICINE

## 2021-11-02 RX ORDER — CLONAZEPAM 0.5 MG/1
TABLET ORAL
Qty: 105 TABLET | Refills: 2 | Status: SHIPPED | OUTPATIENT
Start: 2021-11-02 | End: 2022-01-24 | Stop reason: SDUPTHER

## 2021-11-02 NOTE — PROGRESS NOTES
F/U ARCENIO.     Subjective   Media ZINA Lopez is a 51 y.o. female.     History of Present Illness   Video visit due to covid.  Consent obtained.  11 minute visit.  F/U ARCEINO.  Doing well with meds.  NO SE.  KAASPER appropriate.    F/u KEVIN. Doing well with PPI prn.     The following portions of the patient's history were reviewed and updated as appropriate: allergies, current medications, past family history, past medical history, past social history, past surgical history and problem list.    Review of Systems   Constitutional: Negative for appetite change and fatigue.   HENT: Negative for nosebleeds and sore throat.    Eyes: Negative for blurred vision and visual disturbance.   Respiratory: Negative for shortness of breath and wheezing.    Cardiovascular: Negative for chest pain and leg swelling.   Gastrointestinal: Negative for abdominal distention and abdominal pain.   Endocrine: Negative for cold intolerance and polyuria.   Genitourinary: Negative for dysuria and hematuria.   Musculoskeletal: Negative for arthralgias and myalgias.   Skin: Negative for color change and rash.   Neurological: Negative for weakness and confusion.   Psychiatric/Behavioral: Negative for agitation and depressed mood.       Patient Active Problem List   Diagnosis   • Back pain   • Anxiety and depression   • Goiter   • Fatigue   • Fibromyalgia   • Herpes zoster   • Insomnia   • Iron deficiency   • Myalgia   • Vitamin B12 deficiency   • Vitamin D deficiency   • History of MRSA infection   • Large bowel obstruction (HCC)   • Seasonal allergies   • GERD without esophagitis   • Hypertriglyceridemia   • Generalized anxiety disorder   • Malabsorption   • Gastroenteritis   • High risk medication use       No Known Allergies      Current Outpatient Medications:   •  Bioflavonoid Products (VITAMIN C) chewable tablet, Chew 240 mg 2 (Two) Times a Day., Disp: , Rfl:   •  Cholecalciferol (VITAMIN D3) 5000 UNITS capsule capsule, Take 5,000 Units by mouth  daily., Disp: , Rfl:   •  clonazePAM (KlonoPIN) 0.5 MG tablet, 1/2 in AM AND Three AT NIGHT PRN ANXIETY/insomnia, Disp: 105 tablet, Rfl: 1  •  diclofenac (VOLTAREN) 1 % gel gel, Place 4 g on the skin as directed by provider 4 (Four) Times a Day., Disp: , Rfl:   •  estradiol (MINIVELLE, VIVELLE-DOT) 0.05 MG/24HR patch, APPLY ONE PATCH TO SKIN TWO TIMES A WEEK, Disp: , Rfl:   •  magnesium gluconate (MAGONATE) 500 MG tablet, Take 27 mg by mouth Daily., Disp: , Rfl:   •  medroxyPROGESTERone (PROVERA) 2.5 MG tablet, Take 2.5 mg by mouth Daily., Disp: , Rfl:   •  omeprazole OTC (PriLOSEC OTC) 20 MG EC tablet, Take 20 mg by mouth daily., Disp: , Rfl:   •  Pediatric Multiple Vit-C-FA ([facility] OMEGA-3 DHA) chewable tablet, Chew 2 tablets Daily., Disp: , Rfl:   •  phentermine (ADIPEX-P) 37.5 MG tablet, Take 37.5 mg by mouth Every Morning., Disp: , Rfl:   •  vitamin B-12 (CYANOCOBALAMIN) 1000 MCG tablet, Take 1 tablet by mouth Daily., Disp: 90 tablet, Rfl: 2    Current Facility-Administered Medications:   •  cyanocobalamin injection 1,000 mcg, 1,000 mcg, Intramuscular, Q28 Days, Gamal Gamez MD, 1,000 mcg at 03/18/20 1555    Past Medical History:   Diagnosis Date   • Acute upper respiratory infection    • Anemia    • Anxiety    • Arthritis    • Depression    • External hemorrhoid, thrombosed    • Fibromyalgia    • Folliculitis    • ARCENIO (generalized anxiety disorder)    • Gastroparesis    • GERD (gastroesophageal reflux disease)    • GERD without esophagitis    • Heartburn    • Hemorrhoids    • High risk medication use    • History of staph infection     LAST TIME 4 YEARS AGO INSIDE RIGHT NARIS   • Hypertriglyceridemia    • Immunization deficiency    • Infected prosthetic mesh of abdominal wall (CMS/HCC)    • Insomnia    • MRSA carrier    • Persistent insomnia    • Pica    • PONV (postoperative nausea and vomiting)    • Shingles    • Strain of rectus abdominis muscle    • Vitamin B12 deficiency    • Volvulus  of intestine (CMS/HCC)        Past Surgical History:   Procedure Laterality Date   • ABDOMINOPLASTY     • BACK SURGERY  09/2015    [facility]   • BARIATRIC SURGERY     • CHOLECYSTECTOMY     • COLECTOMY PARTIAL / TOTAL Right     Right Hemicolectomy   • COLONOSCOPY N/A 7/12/2016    Procedure: COLONOSCOPY into small bowel;  Surgeon: Riley Narayan MD;  Location: [facility] ENDOSCOPY;  Service:    • EXPLORATORY LAPAROTOMY N/A 4/20/2016    Procedure: LAPAROTOMY EXPLORATORY, POSSIBLE BOWEL RESECTION, POSSIBLE OSTOMY;  Surgeon: Riley Narayan MD;  Location: [facility] MAIN OR;  Service:    • EXPLORATORY LAPAROTOMY N/A 12/2/2016    Procedure: exploratory laparotomy, removal of  infected abdominal wall mesh. ;  Surgeon: Riley Narayan MD;  Location: [facility] MAIN OR;  Service:    • GASTRIC BYPASS  2000   • HIP ARTHROSCOPY W/ LABRAL REPAIR Left    • LAPAROSCOPIC TUBAL LIGATION     • OTHER SURGICAL HISTORY      LASER ON BACK   • UMBILICAL HERNIA REPAIR         Family History   Problem Relation Age of Onset   • Heart attack Father    • Diabetes Father    • Stroke Father    • Pancreatitis Father    • Diabetes Sister    • Breast cancer Maternal Grandmother    • Diabetes Paternal Grandmother    • Osteoporosis Paternal Grandmother    • Diabetes Paternal Grandfather    • Arthritis Mother    • No Known Problems Other        Social History     Tobacco Use   • Smoking status: Former Smoker   • Smokeless tobacco: Never Used   • Tobacco comment: smoked less than 1 pack per day for 10 year   Substance Use Topics   • Alcohol use: Yes     Comment: once every couple of months-7 or less drinks per week            Objective     There were no vitals filed for this visit.  There is no height or weight on file to calculate BMI.    Physical Exam  Neurological:      Mental Status: She is oriented to person, place, and time.   Psychiatric:         Thought Content: Thought content normal.          Judgment: Judgment normal.         Lab Results   Component Value Date    GLUCOSE 90 08/13/2020    BUN 19 08/13/2020    CREATININE 0.82 08/13/2020    EGFRIFNONA 74 08/13/2020    EGFRIFAFRI 90 08/13/2020    BCR 23.2 08/13/2020    K 4.5 08/13/2020    CO2 22.8 08/13/2020    CALCIUM 9.3 08/13/2020    PROTENTOTREF 6.3 08/13/2020    ALBUMIN 4.40 08/13/2020    LABIL2 2.3 08/13/2020    AST 15 08/13/2020    ALT 13 08/13/2020       WBC   Date Value Ref Range Status   08/13/2020 7.71 3.40 - 10.80 10*3/mm3 Final     RBC   Date Value Ref Range Status   08/13/2020 4.04 3.77 - 5.28 10*6/mm3 Final     Hemoglobin   Date Value Ref Range Status   08/13/2020 12.5 12.0 - 15.9 g/dL Final   12/03/2016 9.1 (L) 11.9 - 15.5 g/dL Final     Hematocrit   Date Value Ref Range Status   08/13/2020 38.0 34.0 - 46.6 % Final   12/03/2016 30.2 (L) 35.6 - 45.5 % Final     MCV   Date Value Ref Range Status   08/13/2020 94.1 79.0 - 97.0 fL Final   12/03/2016 88.0 80.5 - 98.2 fL Final     MCH   Date Value Ref Range Status   08/13/2020 30.9 26.6 - 33.0 pg Final   12/03/2016 26.5 (L) 26.9 - 32.0 pg Final     MCHC   Date Value Ref Range Status   08/13/2020 32.9 31.5 - 35.7 g/dL Final   12/03/2016 30.1 (L) 32.4 - 36.3 g/dL Final     RDW   Date Value Ref Range Status   08/13/2020 12.1 (L) 12.3 - 15.4 % Final   12/03/2016 14.4 (H) 11.7 - 13.0 % Final     RDW-SD   Date Value Ref Range Status   12/03/2016 46.7 37.0 - 54.0 fl Final     MPV   Date Value Ref Range Status   12/03/2016 10.2 6.0 - 12.0 fL Final     Platelets   Date Value Ref Range Status   08/13/2020 278 140 - 450 10*3/mm3 Final   12/03/2016 277 140 - 500 10*3/mm3 Final     Neutrophil Rel %   Date Value Ref Range Status   08/13/2020 66.1 42.7 - 76.0 % Final     Neutrophil %   Date Value Ref Range Status   12/03/2016 83.8 (H) 42.7 - 76.0 % Final     Lymphocyte Rel %   Date Value Ref Range Status   08/13/2020 22.3 19.6 - 45.3 % Final     Lymphocyte %   Date Value Ref Range Status   12/03/2016 7.9 (L)  19.6 - 45.3 % Final     Monocyte Rel %   Date Value Ref Range Status   08/13/2020 5.6 5.0 - 12.0 % Final     Monocyte %   Date Value Ref Range Status   12/03/2016 5.7 5.0 - 12.0 % Final     Eosinophil Rel %   Date Value Ref Range Status   08/13/2020 4.9 0.3 - 6.2 % Final     Eosinophil %   Date Value Ref Range Status   12/03/2016 2.3 0.3 - 6.2 % Final     Basophil Rel %   Date Value Ref Range Status   08/13/2020 0.8 0.0 - 1.5 % Final     Basophil %   Date Value Ref Range Status   12/03/2016 0.1 0.0 - 1.5 % Final     Immature Grans %   Date Value Ref Range Status   12/03/2016 0.2 0.0 - 0.5 % Final     Neutrophils Absolute   Date Value Ref Range Status   08/13/2020 5.10 1.70 - 7.00 10*3/mm3 Final     Neutrophils, Absolute   Date Value Ref Range Status   12/03/2016 10.19 (H) 1.90 - 8.10 10*3/mm3 Final     Lymphocytes Absolute   Date Value Ref Range Status   08/13/2020 1.72 0.70 - 3.10 10*3/mm3 Final     Lymphocytes, Absolute   Date Value Ref Range Status   12/03/2016 0.96 0.90 - 4.80 10*3/mm3 Final     Monocytes Absolute   Date Value Ref Range Status   08/13/2020 0.43 0.10 - 0.90 10*3/mm3 Final     Monocytes, Absolute   Date Value Ref Range Status   12/03/2016 0.69 0.20 - 1.20 10*3/mm3 Final     Eosinophils Absolute   Date Value Ref Range Status   08/13/2020 0.38 0.00 - 0.40 10*3/mm3 Final     Eosinophils, Absolute   Date Value Ref Range Status   12/03/2016 0.28 0.00 - 0.70 10*3/mm3 Final     Basophils Absolute   Date Value Ref Range Status   08/13/2020 0.06 0.00 - 0.20 10*3/mm3 Final     Basophils, Absolute   Date Value Ref Range Status   12/03/2016 0.01 0.00 - 0.20 10*3/mm3 Final     Immature Grans, Absolute   Date Value Ref Range Status   12/03/2016 0.03 0.00 - 0.03 10*3/mm3 Final     nRBC   Date Value Ref Range Status   08/13/2020 0.0 0.0 - 0.2 /100 WBC Final       No results found for: HGBA1C    Lab Results   Component Value Date    ENQUVGFY39 1,704 (H) 08/13/2020       TSH   Date Value Ref Range Status    08/13/2020 1.660 0.270 - 4.200 uIU/mL Final       No results found for: CHOL  Lab Results   Component Value Date    TRIG 99 08/13/2020     Lab Results   Component Value Date    HDL 64 (H) 08/13/2020     Lab Results   Component Value Date    LDL 77 08/13/2020     Lab Results   Component Value Date    VLDL 19.8 08/13/2020     No results found for: LDLHDL      Procedures    Assessment/Plan   Problems Addressed this Visit     Anxiety and depression    Generalized anxiety disorder - Primary    GERD without esophagitis      Diagnoses       Codes Comments    Generalized anxiety disorder    -  Primary ICD-10-CM: F41.1  ICD-9-CM: 300.02     GERD without esophagitis     ICD-10-CM: K21.9  ICD-9-CM: 530.81     Anxiety and depression     ICD-10-CM: F41.9, F32.A  ICD-9-CM: 300.00, 311       ARCENIO. Controlled.  RF alprazolam.  Pt with responsible use.    KEVIN.  Controlled.  Continue meds.      No orders of the defined types were placed in this encounter.      Current Outpatient Medications   Medication Sig Dispense Refill   • Bioflavonoid Products (VITAMIN C) chewable tablet Chew 240 mg 2 (Two) Times a Day.     • Cholecalciferol (VITAMIN D3) 5000 UNITS capsule capsule Take 5,000 Units by mouth daily.     • clonazePAM (KlonoPIN) 0.5 MG tablet 1/2 in AM AND Three AT NIGHT PRN ANXIETY/insomnia 105 tablet 1   • diclofenac (VOLTAREN) 1 % gel gel Place 4 g on the skin as directed by provider 4 (Four) Times a Day.     • estradiol (MINIVELLE, VIVELLE-DOT) 0.05 MG/24HR patch APPLY ONE PATCH TO SKIN TWO TIMES A WEEK     • magnesium gluconate (MAGONATE) 500 MG tablet Take 27 mg by mouth Daily.     • medroxyPROGESTERone (PROVERA) 2.5 MG tablet Take 2.5 mg by mouth Daily.     • omeprazole OTC (PriLOSEC OTC) 20 MG EC tablet Take 20 mg by mouth daily.     • Pediatric Multiple Vit-C-FA (FLINSTONES GUMMIES OMEGA-3 DHA) chewable tablet Chew 2 tablets Daily.     • phentermine (ADIPEX-P) 37.5 MG tablet Take 37.5 mg by mouth Every Morning.     •  vitamin B-12 (CYANOCOBALAMIN) 1000 MCG tablet Take 1 tablet by mouth Daily. 90 tablet 2     Current Facility-Administered Medications   Medication Dose Route Frequency Provider Last Rate Last Admin   • cyanocobalamin injection 1,000 mcg  1,000 mcg Intramuscular Q28 Days Gamal Gamez MD   1,000 mcg at 03/18/20 1555       Juana Lopez had no medications administered during this visit.    Return in about 3 months (around 2/2/2022).    There are no Patient Instructions on file for this visit.

## 2022-01-24 DIAGNOSIS — F32.A ANXIETY AND DEPRESSION: ICD-10-CM

## 2022-01-24 DIAGNOSIS — F41.9 ANXIETY AND DEPRESSION: ICD-10-CM

## 2022-01-24 RX ORDER — CLONAZEPAM 0.5 MG/1
TABLET ORAL
Qty: 105 TABLET | Refills: 2 | Status: SHIPPED | OUTPATIENT
Start: 2022-01-24 | End: 2022-04-25 | Stop reason: SDUPTHER

## 2022-01-24 NOTE — TELEPHONE ENCOUNTER
Caller: Juana Lopez    Relationship: Self    Best call back number:[phone]       Requested Prescriptions:   Requested Prescriptions     Pending Prescriptions Disp Refills   • clonazePAM (KlonoPIN) 0.5 MG tablet 105 tablet 2     Si/2 in AM AND Three AT NIGHT PRN ANXIETY/insomnia        Pharmacy where request should be sent: [facility] #[id number] - [place], [place] - [address] . - [phone]  - [phone] FX     Additional details provided by patient: PATIENT HAS A WEEK OR A WEEK AND A HALF SUPPLY    Does the patient have less than a 3 day supply:  [] Yes  [x] No    Fito Valencia Rep   22 09:53 EST

## 2022-01-25 ENCOUNTER — TELEMEDICINE (OUTPATIENT)
Dept: FAMILY MEDICINE CLINIC | Facility: CLINIC | Age: 52
End: 2022-01-25

## 2022-01-25 DIAGNOSIS — F41.1 GENERALIZED ANXIETY DISORDER: ICD-10-CM

## 2022-01-25 DIAGNOSIS — U07.1 UPPER RESPIRATORY TRACT INFECTION DUE TO COVID-19 VIRUS: Primary | ICD-10-CM

## 2022-01-25 DIAGNOSIS — J06.9 UPPER RESPIRATORY TRACT INFECTION DUE TO COVID-19 VIRUS: Primary | ICD-10-CM

## 2022-01-25 PROCEDURE — 99214 OFFICE O/P EST MOD 30 MIN: CPT | Performed by: FAMILY MEDICINE

## 2022-04-25 ENCOUNTER — TELEPHONE (OUTPATIENT)
Dept: FAMILY MEDICINE CLINIC | Facility: CLINIC | Age: 52
End: 2022-04-25

## 2022-04-25 DIAGNOSIS — F41.9 ANXIETY AND DEPRESSION: ICD-10-CM

## 2022-04-25 DIAGNOSIS — F32.A ANXIETY AND DEPRESSION: ICD-10-CM

## 2022-04-25 RX ORDER — CLONAZEPAM 0.5 MG/1
TABLET ORAL
Qty: 25 TABLET | Refills: 0 | Status: CANCELLED | OUTPATIENT
Start: 2022-04-25

## 2022-04-25 RX ORDER — CLONAZEPAM 0.5 MG/1
TABLET ORAL
Qty: 32 TABLET | Refills: 0 | Status: SHIPPED | OUTPATIENT
Start: 2022-04-25 | End: 2022-05-02 | Stop reason: SDUPTHER

## 2022-04-25 NOTE — TELEPHONE ENCOUNTER
Caller: Juana Lopez    Relationship: Self    Best call back number: [phone]    What is the best time to reach you: ANY    Who are you requesting to speak with (clinical staff, provider,  specific staff member): CLINICAL         What was the call regarding: PATIENT CALLED AND STATED SHE IS OUT OF HER CLONAZEPAM 0.5MG AND HAS 0 REFILLS. PATIENT WOULD LIKE TO KNOW IF SHE NEEDS TO COME IN FOR A OFFICE VISIT OR IF A MY CHART VISIT IS OKAY. PATIENT STATED IF SHE CANNOT GET IN THIS WEEK IS IT POSSIBLE FOR A SUPPLY OF THE MEDICATION TO BE SENT UNTIL HER APPOINTMENT   Do you require a callback: YES     IF PATIENT IS NOT AVAILABLE PLEASE LEAVE A DETAILED VOICEMAIL

## 2022-04-25 NOTE — TELEPHONE ENCOUNTER
Patient scheduled 5/2/2022, she is wanting to know if she could be sent in enough Clonazepam until her appointment. Please advise.

## 2022-05-02 ENCOUNTER — OFFICE VISIT (OUTPATIENT)
Dept: FAMILY MEDICINE CLINIC | Facility: CLINIC | Age: 52
End: 2022-05-02

## 2022-05-02 VITALS
TEMPERATURE: 98.4 F | HEART RATE: 88 BPM | OXYGEN SATURATION: 98 % | WEIGHT: 189 LBS | BODY MASS INDEX: 32.27 KG/M2 | DIASTOLIC BLOOD PRESSURE: 72 MMHG | HEIGHT: 64 IN | SYSTOLIC BLOOD PRESSURE: 110 MMHG

## 2022-05-02 DIAGNOSIS — F41.9 ANXIETY AND DEPRESSION: ICD-10-CM

## 2022-05-02 DIAGNOSIS — E61.1 IRON DEFICIENCY: ICD-10-CM

## 2022-05-02 DIAGNOSIS — Z11.59 ENCOUNTER FOR HEPATITIS C SCREENING TEST FOR LOW RISK PATIENT: ICD-10-CM

## 2022-05-02 DIAGNOSIS — Z00.00 ENCOUNTER FOR ANNUAL HEALTH EXAMINATION: Primary | ICD-10-CM

## 2022-05-02 DIAGNOSIS — E53.8 VITAMIN B12 DEFICIENCY: ICD-10-CM

## 2022-05-02 DIAGNOSIS — F41.1 GENERALIZED ANXIETY DISORDER: ICD-10-CM

## 2022-05-02 DIAGNOSIS — F32.A ANXIETY AND DEPRESSION: ICD-10-CM

## 2022-05-02 DIAGNOSIS — E55.9 VITAMIN D DEFICIENCY: ICD-10-CM

## 2022-05-02 DIAGNOSIS — H72.91 PERFORATION OF RIGHT TYMPANIC MEMBRANE: ICD-10-CM

## 2022-05-02 DIAGNOSIS — K90.89 OTHER SPECIFIED INTESTINAL MALABSORPTION: ICD-10-CM

## 2022-05-02 PROCEDURE — 99396 PREV VISIT EST AGE 40-64: CPT | Performed by: FAMILY MEDICINE

## 2022-05-02 PROCEDURE — 99214 OFFICE O/P EST MOD 30 MIN: CPT | Performed by: FAMILY MEDICINE

## 2022-05-02 RX ORDER — CLONAZEPAM 0.5 MG/1
TABLET ORAL
Qty: 135 TABLET | Refills: 0 | Status: SHIPPED | OUTPATIENT
Start: 2022-05-02 | End: 2022-05-04 | Stop reason: SDUPTHER

## 2022-05-02 NOTE — PROGRESS NOTES
Patient here for annual physical exam    Subjective   Juana Lopez is a 52 y.o. female.     History of Present Illness   52 year old WF here for annual  The following portions of the patient's history were reviewed and updated as appropriate: allergies, current medications, past family history, past medical history, past social history, past surgical history and problem list    Last colonoscopy:2016  Optometry:utd  Dentist: UTD  Last PSA(if applicable):na  Last mammo(if applicable):utd    F/U ARCENIO.  No SE. Trouble with insomnia with increased stress   F/U B12 def.  On B12 daily.      Immunization History   Administered Date(s) Administered   • COVID-19 (PFIZER) PURPLE CAP 03/27/2021, 04/17/2021, 11/26/2021   • Flu Vaccine Intradermal Quad 18-64YR 11/05/2018, 11/26/2021   • Flu Vaccine Split Quad 11/03/2018, 11/05/2018, 11/26/2021   • FluLaval/Fluarix/Fluzone >6 11/18/2019   • Hepatitis A 07/06/2018   • flucelvax quad pfs =>4 YRS 10/14/2020       Review of Systems   Constitutional: Negative for appetite change and fatigue.   HENT: Negative for nosebleeds and sore throat.    Eyes: Negative for blurred vision and visual disturbance.   Respiratory: Negative for shortness of breath and wheezing.    Cardiovascular: Negative for chest pain and leg swelling.   Gastrointestinal: Negative for abdominal distention and abdominal pain.   Endocrine: Negative for cold intolerance and polyuria.   Genitourinary: Negative for dysuria and hematuria.   Musculoskeletal: Negative for arthralgias and myalgias.   Skin: Negative for color change and rash.   Neurological: Negative for weakness and confusion.   Psychiatric/Behavioral: Negative for agitation and depressed mood.       Patient Active Problem List   Diagnosis   • Back pain   • Anxiety and depression   • Goiter   • Fatigue   • Fibromyalgia   • Herpes zoster   • Insomnia   • Iron deficiency   • Myalgia   • Vitamin B12 deficiency   • Vitamin D deficiency   • History of MRSA  infection   • Large bowel obstruction (HCC)   • Seasonal allergies   • GERD without esophagitis   • Hypertriglyceridemia   • Generalized anxiety disorder   • Malabsorption   • Gastroenteritis   • High risk medication use   • Upper respiratory tract infection due to COVID-19 virus   • Encounter for annual health examination   • Encounter for hepatitis C screening test for low risk patient   • Perforation of right tympanic membrane       No Known Allergies      Current Outpatient Medications:   •  Bioflavonoid Products (VITAMIN C) chewable tablet, Chew 240 mg 2 (Two) Times a Day., Disp: , Rfl:   •  Cholecalciferol (VITAMIN D3) 5000 UNITS capsule capsule, Take 5,000 Units by mouth daily., Disp: , Rfl:   •  clonazePAM (KlonoPIN) 0.5 MG tablet, 1/2 in AM AND four AT NIGHT PRN ANXIETY/insomnia, Disp: 135 tablet, Rfl: 0  •  diclofenac (VOLTAREN) 1 % gel gel, Place 4 g on the skin as directed by provider 4 (Four) Times a Day., Disp: , Rfl:   •  estradiol (MINIVELLE, VIVELLE-DOT) 0.05 MG/24HR patch, APPLY ONE PATCH TO SKIN TWO TIMES A WEEK, Disp: , Rfl:   •  magnesium gluconate (MAGONATE) 500 MG tablet, Take 27 mg by mouth Daily., Disp: , Rfl:   •  medroxyPROGESTERone (PROVERA) 2.5 MG tablet, Take 2.5 mg by mouth Daily., Disp: , Rfl:   •  omeprazole OTC (PriLOSEC OTC) 20 MG EC tablet, Take 20 mg by mouth daily., Disp: , Rfl:   •  Pediatric Multiple Vit-C-FA (FLINSTONES GUMMIES OMEGA-3 DHA) chewable tablet, Chew 2 tablets Daily., Disp: , Rfl:   •  phentermine (ADIPEX-P) 37.5 MG tablet, Take 37.5 mg by mouth Every Morning., Disp: , Rfl:   •  vitamin B-12 (CYANOCOBALAMIN) 1000 MCG tablet, Take 1 tablet by mouth Daily., Disp: 90 tablet, Rfl: 2    Current Facility-Administered Medications:   •  cyanocobalamin injection 1,000 mcg, 1,000 mcg, Intramuscular, Q28 Days, Gamal Gamez MD, 1,000 mcg at 03/18/20 1555    Past Medical History:   Diagnosis Date   • Acute upper respiratory infection    • Anemia    • Anxiety    •  Arthritis    • Depression    • External hemorrhoid, thrombosed    • Fibromyalgia    • Folliculitis    • ARCENIO (generalized anxiety disorder)    • Gastroparesis    • GERD (gastroesophageal reflux disease)    • GERD without esophagitis    • Heartburn    • Hemorrhoids    • High risk medication use    • History of staph infection     LAST TIME 4 YEARS AGO INSIDE RIGHT NARIS   • Hypertriglyceridemia    • Immunization deficiency    • Infected prosthetic mesh of abdominal wall (HCC)    • Insomnia    • MRSA carrier    • Persistent insomnia    • Pica    • PONV (postoperative nausea and vomiting)    • Shingles    • Strain of rectus abdominis muscle    • Vitamin B12 deficiency    • Volvulus of intestine (HCC)        Past Surgical History:   Procedure Laterality Date   • ABDOMINOPLASTY     • BACK SURGERY  09/2015    [facility]   • BARIATRIC SURGERY     • BILATERAL BREAST REDUCTION Bilateral 10/2021   • CHOLECYSTECTOMY     • COLECTOMY PARTIAL / TOTAL Right     Right Hemicolectomy   • COLONOSCOPY N/A 07/12/2016    Procedure: COLONOSCOPY into small bowel;  Surgeon: Riley Narayan MD;  Location: [facility] ENDOSCOPY;  Service:    • EXPLORATORY LAPAROTOMY N/A 04/20/2016    Procedure: LAPAROTOMY EXPLORATORY, POSSIBLE BOWEL RESECTION, POSSIBLE OSTOMY;  Surgeon: Riley Narayan MD;  Location: [facility] MAIN OR;  Service:    • EXPLORATORY LAPAROTOMY N/A 12/02/2016    Procedure: exploratory laparotomy, removal of  infected abdominal wall mesh. ;  Surgeon: Riley Narayan MD;  Location: [facility] MAIN OR;  Service:    • GASTRIC BYPASS  2000   • HIP ARTHROSCOPY W/ LABRAL REPAIR Left    • LAPAROSCOPIC TUBAL LIGATION     • OTHER SURGICAL HISTORY      LASER ON BACK   • UMBILICAL HERNIA REPAIR         Family History   Problem Relation Age of Onset   • Heart attack Father    • Diabetes Father    • Stroke Father    • Pancreatitis Father    • Diabetes Sister    • Breast cancer Maternal  Grandmother    • Diabetes Paternal Grandmother    • Osteoporosis Paternal Grandmother    • Diabetes Paternal Grandfather    • Arthritis Mother    • No Known Problems Other        Social History     Tobacco Use   • Smoking status: Former Smoker   • Smokeless tobacco: Never Used   • Tobacco comment: smoked less than 1 pack per day for 10 year   Substance Use Topics   • Alcohol use: Yes     Comment: once every couple of months-7 or less drinks per week            Objective     Vitals:    05/02/22 1321   BP: 110/72   Pulse: 88   Temp: 98.4 °F (36.9 °C)   SpO2: 98%     Body mass index is 32.44 kg/m².    Physical Exam  Vitals reviewed.   Constitutional:       Appearance: She is well-developed. She is not diaphoretic.   HENT:      Head: Normocephalic and atraumatic.      Ears:      Comments: R eac with small TM perforation posteriorly.  Eyes:      General: No scleral icterus.     Pupils: Pupils are equal, round, and reactive to light.   Neck:      Thyroid: No thyromegaly.   Cardiovascular:      Rate and Rhythm: Normal rate and regular rhythm.      Heart sounds: No murmur heard.    No friction rub. No gallop.   Pulmonary:      Effort: Pulmonary effort is normal. No respiratory distress.      Breath sounds: No wheezing or rales.   Chest:      Chest wall: No tenderness.   Abdominal:      General: Bowel sounds are normal. There is no distension.      Palpations: Abdomen is soft.      Tenderness: There is no abdominal tenderness.   Musculoskeletal:         General: No deformity. Normal range of motion.   Lymphadenopathy:      Cervical: No cervical adenopathy.   Skin:     General: Skin is warm and dry.      Findings: No rash.   Neurological:      Cranial Nerves: No cranial nerve deficit.      Motor: No abnormal muscle tone.      Comments: Hearing intact and equal bilaterally to fingerrub         Lab Results   Component Value Date    GLUCOSE 90 08/13/2020    BUN 19 08/13/2020    CREATININE 0.82 08/13/2020    EGFRIFNONA 74  08/13/2020    EGFRIFAFRI 90 08/13/2020    BCR 23.2 08/13/2020    K 4.5 08/13/2020    CO2 22.8 08/13/2020    CALCIUM 9.3 08/13/2020    PROTENTOTREF 6.3 08/13/2020    ALBUMIN 4.40 08/13/2020    LABIL2 2.3 08/13/2020    AST 15 08/13/2020    ALT 13 08/13/2020       WBC   Date Value Ref Range Status   08/13/2020 7.71 3.40 - 10.80 10*3/mm3 Final     RBC   Date Value Ref Range Status   08/13/2020 4.04 3.77 - 5.28 10*6/mm3 Final     Hemoglobin   Date Value Ref Range Status   08/13/2020 12.5 12.0 - 15.9 g/dL Final   12/03/2016 9.1 (L) 11.9 - 15.5 g/dL Final     Hematocrit   Date Value Ref Range Status   08/13/2020 38.0 34.0 - 46.6 % Final   12/03/2016 30.2 (L) 35.6 - 45.5 % Final     MCV   Date Value Ref Range Status   08/13/2020 94.1 79.0 - 97.0 fL Final   12/03/2016 88.0 80.5 - 98.2 fL Final     MCH   Date Value Ref Range Status   08/13/2020 30.9 26.6 - 33.0 pg Final   12/03/2016 26.5 (L) 26.9 - 32.0 pg Final     MCHC   Date Value Ref Range Status   08/13/2020 32.9 31.5 - 35.7 g/dL Final   12/03/2016 30.1 (L) 32.4 - 36.3 g/dL Final     RDW   Date Value Ref Range Status   08/13/2020 12.1 (L) 12.3 - 15.4 % Final   12/03/2016 14.4 (H) 11.7 - 13.0 % Final     RDW-SD   Date Value Ref Range Status   12/03/2016 46.7 37.0 - 54.0 fl Final     MPV   Date Value Ref Range Status   12/03/2016 10.2 6.0 - 12.0 fL Final     Platelets   Date Value Ref Range Status   08/13/2020 278 140 - 450 10*3/mm3 Final   12/03/2016 277 140 - 500 10*3/mm3 Final     Neutrophil Rel %   Date Value Ref Range Status   08/13/2020 66.1 42.7 - 76.0 % Final     Neutrophil %   Date Value Ref Range Status   12/03/2016 83.8 (H) 42.7 - 76.0 % Final     Lymphocyte Rel %   Date Value Ref Range Status   08/13/2020 22.3 19.6 - 45.3 % Final     Lymphocyte %   Date Value Ref Range Status   12/03/2016 7.9 (L) 19.6 - 45.3 % Final     Monocyte Rel %   Date Value Ref Range Status   08/13/2020 5.6 5.0 - 12.0 % Final     Monocyte %   Date Value Ref Range Status   12/03/2016 5.7  5.0 - 12.0 % Final     Eosinophil Rel %   Date Value Ref Range Status   08/13/2020 4.9 0.3 - 6.2 % Final     Eosinophil %   Date Value Ref Range Status   12/03/2016 2.3 0.3 - 6.2 % Final     Basophil Rel %   Date Value Ref Range Status   08/13/2020 0.8 0.0 - 1.5 % Final     Basophil %   Date Value Ref Range Status   12/03/2016 0.1 0.0 - 1.5 % Final     Immature Grans %   Date Value Ref Range Status   12/03/2016 0.2 0.0 - 0.5 % Final     Neutrophils Absolute   Date Value Ref Range Status   08/13/2020 5.10 1.70 - 7.00 10*3/mm3 Final     Neutrophils, Absolute   Date Value Ref Range Status   12/03/2016 10.19 (H) 1.90 - 8.10 10*3/mm3 Final     Lymphocytes Absolute   Date Value Ref Range Status   08/13/2020 1.72 0.70 - 3.10 10*3/mm3 Final     Lymphocytes, Absolute   Date Value Ref Range Status   12/03/2016 0.96 0.90 - 4.80 10*3/mm3 Final     Monocytes Absolute   Date Value Ref Range Status   08/13/2020 0.43 0.10 - 0.90 10*3/mm3 Final     Monocytes, Absolute   Date Value Ref Range Status   12/03/2016 0.69 0.20 - 1.20 10*3/mm3 Final     Eosinophils Absolute   Date Value Ref Range Status   08/13/2020 0.38 0.00 - 0.40 10*3/mm3 Final     Eosinophils, Absolute   Date Value Ref Range Status   12/03/2016 0.28 0.00 - 0.70 10*3/mm3 Final     Basophils Absolute   Date Value Ref Range Status   08/13/2020 0.06 0.00 - 0.20 10*3/mm3 Final     Basophils, Absolute   Date Value Ref Range Status   12/03/2016 0.01 0.00 - 0.20 10*3/mm3 Final     Immature Grans, Absolute   Date Value Ref Range Status   12/03/2016 0.03 0.00 - 0.03 10*3/mm3 Final     nRBC   Date Value Ref Range Status   08/13/2020 0.0 0.0 - 0.2 /100 WBC Final       No results found for: HGBA1C    Lab Results   Component Value Date    PNZILFCI29 1,704 (H) 08/13/2020       TSH   Date Value Ref Range Status   08/13/2020 1.660 0.270 - 4.200 uIU/mL Final       No results found for: CHOL  Lab Results   Component Value Date    TRIG 99 08/13/2020     Lab Results   Component Value Date     HDL 64 (H) 08/13/2020     Lab Results   Component Value Date    LDL 77 08/13/2020     Lab Results   Component Value Date    VLDL 19.8 08/13/2020     No results found for: LDLHDL      Procedures    Assessment/Plan   Problems Addressed this Visit     Anxiety and depression    Relevant Medications    clonazePAM (KlonoPIN) 0.5 MG tablet    Encounter for annual health examination - Primary    Encounter for hepatitis C screening test for low risk patient    Relevant Orders    Hepatitis C Antibody    Generalized anxiety disorder    Iron deficiency    Relevant Orders    CBC & Differential    Malabsorption    Relevant Orders    CBC & Differential    Comprehensive Metabolic Panel    Lipid Panel With / Chol / HDL Ratio    Iron    Ferritin    Folate    Perforation of right tympanic membrane    Vitamin B12 deficiency    Relevant Orders    Vitamin B12    Vitamin D deficiency    Relevant Orders    Vitamin D 1,25 Dihydroxy      Diagnoses       Codes Comments    Encounter for annual health examination    -  Primary ICD-10-CM: Z00.00  ICD-9-CM: V70.0     Generalized anxiety disorder     ICD-10-CM: F41.1  ICD-9-CM: 300.02     Iron deficiency     ICD-10-CM: E61.1  ICD-9-CM: 280.9     Other specified intestinal malabsorption     ICD-10-CM: K90.89  ICD-9-CM: 579.8     Vitamin B12 deficiency     ICD-10-CM: E53.8  ICD-9-CM: 266.2     Vitamin D deficiency     ICD-10-CM: E55.9  ICD-9-CM: 268.9     Encounter for hepatitis C screening test for low risk patient     ICD-10-CM: Z11.59  ICD-9-CM: V73.89     Anxiety and depression     ICD-10-CM: F41.9, F32.A  ICD-9-CM: 300.00, 311     Perforation of right tympanic membrane     ICD-10-CM: H72.91  ICD-9-CM: 384.20       ARCENIO with insomnia.  Uncontrolle.d  Increase clonazepam to 0.5 1/2 in am and 4 at night.  135.  0 refill.  Go back to 120 tablets in 1 month.    B12 def.  Check B12. On b12 daily.    Vit D  Def.  Check levels.  Continue B12.    R TM perforation.  Extremely small.  No drainage or  symptoms.  Informed pt to take water precautions and if drainage at any time let us know and can get to ENT.  No intervention as present as hearing intact.      Preventive Counseling:  Encouraged to stay active.  Covid vaccine UTD.  HEP A UTD.   Colonoscopy status to be checked by pt with her GI doctor.  Mammo upcoming.    Dentist UTD.  Optho UTD.      Orders Placed This Encounter   Procedures   • Comprehensive Metabolic Panel     Order Specific Question:   Release to patient     Answer:   Immediate   • Lipid Panel With / Chol / HDL Ratio     Order Specific Question:   Release to patient     Answer:   Immediate   • Hepatitis C Antibody     Order Specific Question:   Release to patient     Answer:   Immediate   • Vitamin B12     Order Specific Question:   Release to patient     Answer:   Immediate   • Vitamin D 1,25 Dihydroxy     Order Specific Question:   Release to patient     Answer:   Immediate   • Iron   • Ferritin   • Folate     Order Specific Question:   Release to patient     Answer:   Immediate   • CBC & Differential     Order Specific Question:   Manual Differential     Answer:   No       Current Outpatient Medications   Medication Sig Dispense Refill   • Bioflavonoid Products (VITAMIN C) chewable tablet Chew 240 mg 2 (Two) Times a Day.     • Cholecalciferol (VITAMIN D3) 5000 UNITS capsule capsule Take 5,000 Units by mouth daily.     • clonazePAM (KlonoPIN) 0.5 MG tablet 1/2 in AM AND four AT NIGHT PRN ANXIETY/insomnia 135 tablet 0   • diclofenac (VOLTAREN) 1 % gel gel Place 4 g on the skin as directed by provider 4 (Four) Times a Day.     • estradiol (MINIVELLE, VIVELLE-DOT) 0.05 MG/24HR patch APPLY ONE PATCH TO SKIN TWO TIMES A WEEK     • magnesium gluconate (MAGONATE) 500 MG tablet Take 27 mg by mouth Daily.     • medroxyPROGESTERone (PROVERA) 2.5 MG tablet Take 2.5 mg by mouth Daily.     • omeprazole OTC (PriLOSEC OTC) 20 MG EC tablet Take 20 mg by mouth daily.     • Pediatric Multiple Vit-C-FA  (FLINSTONES GUMMIES OMEGA-3 DHA) chewable tablet Chew 2 tablets Daily.     • phentermine (ADIPEX-P) 37.5 MG tablet Take 37.5 mg by mouth Every Morning.     • vitamin B-12 (CYANOCOBALAMIN) 1000 MCG tablet Take 1 tablet by mouth Daily. 90 tablet 2     Current Facility-Administered Medications   Medication Dose Route Frequency Provider Last Rate Last Admin   • cyanocobalamin injection 1,000 mcg  1,000 mcg Intramuscular Q28 Days Gamal Gamez MD   1,000 mcg at 03/18/20 1555       Return in about 2 months (around 7/2/2022).    There are no Patient Instructions on file for this visit.

## 2022-05-04 ENCOUNTER — TELEPHONE (OUTPATIENT)
Dept: FAMILY MEDICINE CLINIC | Facility: CLINIC | Age: 52
End: 2022-05-04

## 2022-05-04 DIAGNOSIS — F41.9 ANXIETY AND DEPRESSION: ICD-10-CM

## 2022-05-04 DIAGNOSIS — F32.A ANXIETY AND DEPRESSION: ICD-10-CM

## 2022-05-04 LAB
1,25(OH)2D SERPL-MCNC: 28.4 PG/ML (ref 19.9–79.3)
ALBUMIN SERPL-MCNC: 4.4 G/DL (ref 3.8–4.9)
ALBUMIN/GLOB SERPL: 1.8 {RATIO} (ref 1.2–2.2)
ALP SERPL-CCNC: 127 IU/L (ref 44–121)
ALT SERPL-CCNC: 15 IU/L (ref 0–32)
AST SERPL-CCNC: 13 IU/L (ref 0–40)
BASOPHILS # BLD AUTO: 0.1 X10E3/UL (ref 0–0.2)
BASOPHILS NFR BLD AUTO: 1 %
BILIRUB SERPL-MCNC: 0.2 MG/DL (ref 0–1.2)
BUN SERPL-MCNC: 11 MG/DL (ref 6–24)
BUN/CREAT SERPL: 16 (ref 9–23)
CALCIUM SERPL-MCNC: 9 MG/DL (ref 8.7–10.2)
CHLORIDE SERPL-SCNC: 106 MMOL/L (ref 96–106)
CHOLEST SERPL-MCNC: 183 MG/DL (ref 100–199)
CHOLEST/HDLC SERPL: 3.2 RATIO (ref 0–4.4)
CO2 SERPL-SCNC: 21 MMOL/L (ref 20–29)
CREAT SERPL-MCNC: 0.67 MG/DL (ref 0.57–1)
EGFRCR SERPLBLD CKD-EPI 2021: 106 ML/MIN/1.73
EOSINOPHIL # BLD AUTO: 0.3 X10E3/UL (ref 0–0.4)
EOSINOPHIL NFR BLD AUTO: 5 %
ERYTHROCYTE [DISTWIDTH] IN BLOOD BY AUTOMATED COUNT: 12.6 % (ref 11.7–15.4)
FERRITIN SERPL-MCNC: 21 NG/ML (ref 15–150)
FOLATE SERPL-MCNC: 19.3 NG/ML
GLOBULIN SER CALC-MCNC: 2.4 G/DL (ref 1.5–4.5)
GLUCOSE SERPL-MCNC: 110 MG/DL (ref 65–99)
HCT VFR BLD AUTO: 39.2 % (ref 34–46.6)
HCV AB S/CO SERPL IA: <0.1 S/CO RATIO (ref 0–0.9)
HDLC SERPL-MCNC: 58 MG/DL
HGB BLD-MCNC: 12.5 G/DL (ref 11.1–15.9)
IMM GRANULOCYTES # BLD AUTO: 0 X10E3/UL (ref 0–0.1)
IMM GRANULOCYTES NFR BLD AUTO: 0 %
IRON SERPL-MCNC: 82 UG/DL (ref 27–159)
LDLC SERPL CALC-MCNC: 98 MG/DL (ref 0–99)
LYMPHOCYTES # BLD AUTO: 1.7 X10E3/UL (ref 0.7–3.1)
LYMPHOCYTES NFR BLD AUTO: 25 %
MCH RBC QN AUTO: 29.6 PG (ref 26.6–33)
MCHC RBC AUTO-ENTMCNC: 31.9 G/DL (ref 31.5–35.7)
MCV RBC AUTO: 93 FL (ref 79–97)
MONOCYTES # BLD AUTO: 0.4 X10E3/UL (ref 0.1–0.9)
MONOCYTES NFR BLD AUTO: 6 %
NEUTROPHILS # BLD AUTO: 4.3 X10E3/UL (ref 1.4–7)
NEUTROPHILS NFR BLD AUTO: 63 %
PLATELET # BLD AUTO: 267 X10E3/UL (ref 150–450)
POTASSIUM SERPL-SCNC: 4.6 MMOL/L (ref 3.5–5.2)
PROT SERPL-MCNC: 6.8 G/DL (ref 6–8.5)
RBC # BLD AUTO: 4.22 X10E6/UL (ref 3.77–5.28)
SODIUM SERPL-SCNC: 143 MMOL/L (ref 134–144)
TRIGL SERPL-MCNC: 157 MG/DL (ref 0–149)
VIT B12 SERPL-MCNC: 626 PG/ML (ref 232–1245)
VLDLC SERPL CALC-MCNC: 27 MG/DL (ref 5–40)
WBC # BLD AUTO: 6.7 X10E3/UL (ref 3.4–10.8)

## 2022-05-04 RX ORDER — CLONAZEPAM 0.5 MG/1
TABLET ORAL
Qty: 105 TABLET | Refills: 2 | Status: SHIPPED | OUTPATIENT
Start: 2022-05-04 | End: 2022-07-20 | Stop reason: SDUPTHER

## 2022-05-10 ENCOUNTER — TELEPHONE (OUTPATIENT)
Dept: FAMILY MEDICINE CLINIC | Facility: CLINIC | Age: 52
End: 2022-05-10

## 2022-05-10 NOTE — TELEPHONE ENCOUNTER
Caller: Juana Lopez    Relationship: Self    Best call back number: [phone]    What was the call regarding: PATIENT STATED SHE WAS SUPPOSED TO COME BACK IN FOR AN APPOINTMENT WITH DR PALAFOX IN TWO MONTHS DUE TO MEDICATION CHANGES. PATIENT STATED THAT INSURANCE WILL NOT APPROVE THE CHANGE TO HER MEDICATIONS SO PATIENT WANTED TO KNOW IF DR PALAFOX STILL WANTED TO SEE HER IN TWO MONTHS OR IF THAT COULD BE RESCHEDULED FOR THREE MONTHS. PLEASE ADVISE.     Do you require a callback: YES

## 2022-06-28 ENCOUNTER — TELEMEDICINE (OUTPATIENT)
Dept: FAMILY MEDICINE CLINIC | Facility: CLINIC | Age: 52
End: 2022-06-28

## 2022-06-28 DIAGNOSIS — J06.9 UPPER RESPIRATORY INFECTION, ACUTE: Primary | ICD-10-CM

## 2022-06-28 DIAGNOSIS — R53.83 FATIGUE, UNSPECIFIED TYPE: ICD-10-CM

## 2022-06-28 PROCEDURE — 99214 OFFICE O/P EST MOD 30 MIN: CPT | Performed by: FAMILY MEDICINE

## 2022-06-28 RX ORDER — AZITHROMYCIN 250 MG/1
TABLET, FILM COATED ORAL
Qty: 6 TABLET | Refills: 0 | Status: SHIPPED | OUTPATIENT
Start: 2022-06-28 | End: 2022-07-03

## 2022-06-28 NOTE — PROGRESS NOTES
Complaint of cough    Subjective   Media ZINA Lopez is a 52 y.o. female.     History of Present Illness   Video visit due to illness and unable to come to the office.  Consent obtained.  8 minute visit.    C/o cough with fatigue.  Patient states she is got a lot of head and chest congestion.  Started 5 days ago.  Patient states her grandkids had this last week.  She states she has had 4 COVID test and all have been negative.  She has had 3 COVID vaccinations.  She is not running a fever.  The following portions of the patient's history were reviewed and updated as appropriate: allergies, current medications, past family history, past medical history, past social history, past surgical history and problem list.    Review of Systems   Constitutional: Positive for fatigue. Negative for appetite change.   HENT: Positive for sinus pressure. Negative for nosebleeds and sore throat.    Eyes: Negative for blurred vision and visual disturbance.   Respiratory: Positive for cough. Negative for shortness of breath and wheezing.    Cardiovascular: Negative for chest pain and leg swelling.   Gastrointestinal: Negative for abdominal distention and abdominal pain.   Endocrine: Negative for cold intolerance and polyuria.   Genitourinary: Negative for dysuria and hematuria.   Musculoskeletal: Negative for arthralgias and myalgias.   Skin: Negative for color change and rash.   Neurological: Negative for weakness and confusion.   Psychiatric/Behavioral: Negative for agitation and depressed mood.       Patient Active Problem List   Diagnosis   • Back pain   • Anxiety and depression   • Goiter   • Fatigue   • Fibromyalgia   • Herpes zoster   • Insomnia   • Iron deficiency   • Myalgia   • Vitamin B12 deficiency   • Vitamin D deficiency   • History of MRSA infection   • Large bowel obstruction (HCC)   • Seasonal allergies   • GERD without esophagitis   • Hypertriglyceridemia   • Generalized anxiety disorder   • Malabsorption   •  Gastroenteritis   • High risk medication use   • Upper respiratory tract infection due to COVID-19 virus   • Encounter for annual health examination   • Encounter for hepatitis C screening test for low risk patient   • Perforation of right tympanic membrane   • Upper respiratory infection, acute       No Known Allergies      Current Outpatient Medications:   •  azithromycin (Zithromax Z-George) 250 MG tablet, Take 2 tablets the first day, then 1 tablet daily for 4 days., Disp: 6 tablet, Rfl: 0  •  Bioflavonoid Products (VITAMIN C) chewable tablet, Chew 240 mg 2 (Two) Times a Day., Disp: , Rfl:   •  Cholecalciferol (VITAMIN D3) 5000 UNITS capsule capsule, Take 5,000 Units by mouth daily., Disp: , Rfl:   •  clonazePAM (KlonoPIN) 0.5 MG tablet, 1/2 in AM AND 3  AT NIGHT PRN ANXIETY/insomnia, Disp: 105 tablet, Rfl: 2  •  diclofenac (VOLTAREN) 1 % gel gel, Place 4 g on the skin as directed by provider 4 (Four) Times a Day., Disp: , Rfl:   •  estradiol (MINIVELLE, VIVELLE-DOT) 0.05 MG/24HR patch, APPLY ONE PATCH TO SKIN TWO TIMES A WEEK, Disp: , Rfl:   •  magnesium gluconate (MAGONATE) 500 MG tablet, Take 27 mg by mouth Daily., Disp: , Rfl:   •  medroxyPROGESTERone (PROVERA) 2.5 MG tablet, Take 2.5 mg by mouth Daily., Disp: , Rfl:   •  omeprazole OTC (PriLOSEC OTC) 20 MG EC tablet, Take 20 mg by mouth daily., Disp: , Rfl:   •  Pediatric Multiple Vit-C-FA (FLINSTONES GUMMIES OMEGA-3 DHA) chewable tablet, Chew 2 tablets Daily., Disp: , Rfl:   •  phentermine (ADIPEX-P) 37.5 MG tablet, Take 37.5 mg by mouth Every Morning., Disp: , Rfl:   •  vitamin B-12 (CYANOCOBALAMIN) 1000 MCG tablet, Take 1 tablet by mouth Daily., Disp: 90 tablet, Rfl: 2    Current Facility-Administered Medications:   •  cyanocobalamin injection 1,000 mcg, 1,000 mcg, Intramuscular, Q28 Days, Gamal Gamez MD, 1,000 mcg at 03/18/20 1555    Past Medical History:   Diagnosis Date   • Acute upper respiratory infection    • Anemia    • Anxiety    • Arthritis     • Depression    • External hemorrhoid, thrombosed    • Fibromyalgia    • Folliculitis    • ARCENIO (generalized anxiety disorder)    • Gastroparesis    • GERD (gastroesophageal reflux disease)    • GERD without esophagitis    • Heartburn    • Hemorrhoids    • High risk medication use    • History of staph infection     LAST TIME 4 YEARS AGO INSIDE RIGHT NARIS   • Hypertriglyceridemia    • Immunization deficiency    • Infected prosthetic mesh of abdominal wall (HCC)    • Insomnia    • MRSA carrier    • Persistent insomnia    • Pica    • PONV (postoperative nausea and vomiting)    • Shingles    • Strain of rectus abdominis muscle    • Vitamin B12 deficiency    • Volvulus of intestine (HCC)        Past Surgical History:   Procedure Laterality Date   • ABDOMINOPLASTY     • BACK SURGERY  09/2015    [facility]   • BARIATRIC SURGERY     • BILATERAL BREAST REDUCTION Bilateral 10/2021   • CHOLECYSTECTOMY     • COLECTOMY PARTIAL / TOTAL Right     Right Hemicolectomy   • COLONOSCOPY N/A 07/12/2016    Procedure: COLONOSCOPY into small bowel;  Surgeon: Riley Narayan MD;  Location: [facility] END[facility];  Service:    • EXPLORATORY LAPAROTOMY N/A 04/20/2016    Procedure: LAPAROTOMY EXPLORATORY, POSSIBLE BOWEL RESECTION, POSSIBLE OSTOMY;  Surgeon: Riley Narayan MD;  Location: [facility] MAIN OR;  Service:    • EXPLORATORY LAPAROTOMY N/A 12/02/2016    Procedure: exploratory laparotomy, removal of  infected abdominal wall mesh. ;  Surgeon: Riley Narayan MD;  Location: [facility] MAIN OR;  Service:    • GASTRIC BYPASS  2000   • HIP ARTHROSCOPY W/ LABRAL REPAIR Left    • LAPAROSCOPIC TUBAL LIGATION     • OTHER SURGICAL HISTORY      LASER ON BACK   • UMBILICAL HERNIA REPAIR         Family History   Problem Relation Age of Onset   • Heart attack Father    • Diabetes Father    • Stroke Father    • Pancreatitis Father    • Diabetes Sister    • Breast cancer Maternal Grandmother    •  Diabetes Paternal Grandmother    • Osteoporosis Paternal Grandmother    • Diabetes Paternal Grandfather    • Arthritis Mother    • No Known Problems Other        Social History     Tobacco Use   • Smoking status: Former Smoker   • Smokeless tobacco: Never Used   • Tobacco comment: smoked less than 1 pack per day for 10 year   Substance Use Topics   • Alcohol use: Yes     Comment: once every couple of months-7 or less drinks per week            Objective     There were no vitals filed for this visit.  There is no height or weight on file to calculate BMI.    Physical Exam  Vitals reviewed.   Constitutional:       Appearance: She is well-developed. She is not diaphoretic.   HENT:      Head: Normocephalic and atraumatic.   Eyes:      General: No scleral icterus.     Pupils: Pupils are equal, round, and reactive to light.   Neck:      Thyroid: No thyromegaly.   Cardiovascular:      Rate and Rhythm: Normal rate and regular rhythm.      Heart sounds: No murmur heard.    No friction rub. No gallop.   Pulmonary:      Effort: Pulmonary effort is normal. No respiratory distress.      Breath sounds: No wheezing or rales.   Chest:      Chest wall: No tenderness.   Abdominal:      General: Bowel sounds are normal. There is no distension.      Palpations: Abdomen is soft.      Tenderness: There is no abdominal tenderness.   Musculoskeletal:         General: No deformity. Normal range of motion.   Lymphadenopathy:      Cervical: No cervical adenopathy.   Skin:     General: Skin is warm and dry.      Findings: No rash.   Neurological:      Cranial Nerves: No cranial nerve deficit.      Motor: No abnormal muscle tone.         Lab Results   Component Value Date    GLUCOSE 110 (H) 05/02/2022    BUN 11 05/02/2022    CREATININE 0.67 05/02/2022    EGFRIFNONA 74 08/13/2020    EGFRIFAFRI 90 08/13/2020    BCR 16 05/02/2022    K 4.6 05/02/2022    CO2 21 05/02/2022    CALCIUM 9.0 05/02/2022    PROTENTOTREF 6.8 05/02/2022    ALBUMIN 4.4  05/02/2022    LABIL2 1.8 05/02/2022    AST 13 05/02/2022    ALT 15 05/02/2022       WBC   Date Value Ref Range Status   05/02/2022 6.7 3.4 - 10.8 x10E3/uL Final     RBC   Date Value Ref Range Status   05/02/2022 4.22 3.77 - 5.28 x10E6/uL Final     Hemoglobin   Date Value Ref Range Status   05/02/2022 12.5 11.1 - 15.9 g/dL Final   12/03/2016 9.1 (L) 11.9 - 15.5 g/dL Final     Hematocrit   Date Value Ref Range Status   05/02/2022 39.2 34.0 - 46.6 % Final   12/03/2016 30.2 (L) 35.6 - 45.5 % Final     MCV   Date Value Ref Range Status   05/02/2022 93 79 - 97 fL Final   12/03/2016 88.0 80.5 - 98.2 fL Final     MCH   Date Value Ref Range Status   05/02/2022 29.6 26.6 - 33.0 pg Final   12/03/2016 26.5 (L) 26.9 - 32.0 pg Final     MCHC   Date Value Ref Range Status   05/02/2022 31.9 31.5 - 35.7 g/dL Final   12/03/2016 30.1 (L) 32.4 - 36.3 g/dL Final     RDW   Date Value Ref Range Status   05/02/2022 12.6 11.7 - 15.4 % Final   12/03/2016 14.4 (H) 11.7 - 13.0 % Final     RDW-SD   Date Value Ref Range Status   12/03/2016 46.7 37.0 - 54.0 fl Final     MPV   Date Value Ref Range Status   12/03/2016 10.2 6.0 - 12.0 fL Final     Platelets   Date Value Ref Range Status   05/02/2022 267 150 - 450 x10E3/uL Final   12/03/2016 277 140 - 500 10*3/mm3 Final     Neutrophil Rel %   Date Value Ref Range Status   05/02/2022 63 Not Estab. % Final     Neutrophil %   Date Value Ref Range Status   12/03/2016 83.8 (H) 42.7 - 76.0 % Final     Lymphocyte Rel %   Date Value Ref Range Status   05/02/2022 25 Not Estab. % Final     Lymphocyte %   Date Value Ref Range Status   12/03/2016 7.9 (L) 19.6 - 45.3 % Final     Monocyte Rel %   Date Value Ref Range Status   05/02/2022 6 Not Estab. % Final     Monocyte %   Date Value Ref Range Status   12/03/2016 5.7 5.0 - 12.0 % Final     Eosinophil Rel %   Date Value Ref Range Status   05/02/2022 5 Not Estab. % Final     Eosinophil %   Date Value Ref Range Status   12/03/2016 2.3 0.3 - 6.2 % Final     Basophil  Rel %   Date Value Ref Range Status   05/02/2022 1 Not Estab. % Final     Basophil %   Date Value Ref Range Status   12/03/2016 0.1 0.0 - 1.5 % Final     Immature Grans %   Date Value Ref Range Status   12/03/2016 0.2 0.0 - 0.5 % Final     Neutrophils Absolute   Date Value Ref Range Status   05/02/2022 4.3 1.4 - 7.0 x10E3/uL Final     Neutrophils, Absolute   Date Value Ref Range Status   12/03/2016 10.19 (H) 1.90 - 8.10 10*3/mm3 Final     Lymphocytes Absolute   Date Value Ref Range Status   05/02/2022 1.7 0.7 - 3.1 x10E3/uL Final     Lymphocytes, Absolute   Date Value Ref Range Status   12/03/2016 0.96 0.90 - 4.80 10*3/mm3 Final     Monocytes Absolute   Date Value Ref Range Status   05/02/2022 0.4 0.1 - 0.9 x10E3/uL Final     Monocytes, Absolute   Date Value Ref Range Status   12/03/2016 0.69 0.20 - 1.20 10*3/mm3 Final     Eosinophils Absolute   Date Value Ref Range Status   05/02/2022 0.3 0.0 - 0.4 x10E3/uL Final     Eosinophils, Absolute   Date Value Ref Range Status   12/03/2016 0.28 0.00 - 0.70 10*3/mm3 Final     Basophils Absolute   Date Value Ref Range Status   05/02/2022 0.1 0.0 - 0.2 x10E3/uL Final     Basophils, Absolute   Date Value Ref Range Status   12/03/2016 0.01 0.00 - 0.20 10*3/mm3 Final     Immature Grans, Absolute   Date Value Ref Range Status   12/03/2016 0.03 0.00 - 0.03 10*3/mm3 Final     nRBC   Date Value Ref Range Status   08/13/2020 0.0 0.0 - 0.2 /100 WBC Final       No results found for: HGBA1C    Lab Results   Component Value Date    QFCPSCWD05 626 05/02/2022       TSH   Date Value Ref Range Status   08/13/2020 1.660 0.270 - 4.200 uIU/mL Final       No results found for: CHOL  Lab Results   Component Value Date    TRIG 157 (H) 05/02/2022     Lab Results   Component Value Date    HDL 58 05/02/2022     Lab Results   Component Value Date    LDL 98 05/02/2022     Lab Results   Component Value Date    VLDL 27 05/02/2022     No results found for: LDLHDL      Procedures    Assessment & Plan    Problems Addressed this Visit     Fatigue    Upper respiratory infection, acute - Primary    Relevant Medications    azithromycin (Zithromax Z-George) 250 MG tablet      Diagnoses       Codes Comments    Upper respiratory infection, acute    -  Primary ICD-10-CM: J06.9  ICD-9-CM: 465.9     Fatigue, unspecified type     ICD-10-CM: R53.83  ICD-9-CM: 780.79       Acute upper respiratory infection with systemic side effects.  Will treat for secondary bacterial causes with a Z-George.  Use Delsym with Zyrtec daily.  Use mentholated cough drops as needed.  Informed patient she is still infectious and to remain away from people while she has symptoms.    No orders of the defined types were placed in this encounter.      Current Outpatient Medications   Medication Sig Dispense Refill   • azithromycin (Zithromax Z-George) 250 MG tablet Take 2 tablets the first day, then 1 tablet daily for 4 days. 6 tablet 0   • Bioflavonoid Products (VITAMIN C) chewable tablet Chew 240 mg 2 (Two) Times a Day.     • Cholecalciferol (VITAMIN D3) 5000 UNITS capsule capsule Take 5,000 Units by mouth daily.     • clonazePAM (KlonoPIN) 0.5 MG tablet 1/2 in AM AND 3  AT NIGHT PRN ANXIETY/insomnia 105 tablet 2   • diclofenac (VOLTAREN) 1 % gel gel Place 4 g on the skin as directed by provider 4 (Four) Times a Day.     • estradiol (MINIVELLE, VIVELLE-DOT) 0.05 MG/24HR patch APPLY ONE PATCH TO SKIN TWO TIMES A WEEK     • magnesium gluconate (MAGONATE) 500 MG tablet Take 27 mg by mouth Daily.     • medroxyPROGESTERone (PROVERA) 2.5 MG tablet Take 2.5 mg by mouth Daily.     • omeprazole OTC (PriLOSEC OTC) 20 MG EC tablet Take 20 mg by mouth daily.     • Pediatric Multiple Vit-C-FA (FLINSTONES GUMMIES OMEGA-3 DHA) chewable tablet Chew 2 tablets Daily.     • phentermine (ADIPEX-P) 37.5 MG tablet Take 37.5 mg by mouth Every Morning.     • vitamin B-12 (CYANOCOBALAMIN) 1000 MCG tablet Take 1 tablet by mouth Daily. 90 tablet 2     Current Facility-Administered  Medications   Medication Dose Route Frequency Provider Last Rate Last Admin   • cyanocobalamin injection 1,000 mcg  1,000 mcg Intramuscular Q28 Days Gamal Gamez MD   1,000 mcg at 03/18/20 1555       Juana Lopez had no medications administered during this visit.    No follow-ups on file.    There are no Patient Instructions on file for this visit.

## 2022-07-01 ENCOUNTER — APPOINTMENT (OUTPATIENT)
Dept: WOMENS IMAGING | Facility: HOSPITAL | Age: 52
End: 2022-07-01

## 2022-07-01 PROCEDURE — 77063 BREAST TOMOSYNTHESIS BI: CPT | Performed by: RADIOLOGY

## 2022-07-01 PROCEDURE — 77067 SCR MAMMO BI INCL CAD: CPT | Performed by: RADIOLOGY

## 2022-07-20 DIAGNOSIS — F41.9 ANXIETY AND DEPRESSION: ICD-10-CM

## 2022-07-20 DIAGNOSIS — F32.A ANXIETY AND DEPRESSION: ICD-10-CM

## 2022-07-20 RX ORDER — CLONAZEPAM 0.5 MG/1
TABLET ORAL
Qty: 105 TABLET | Refills: 2 | Status: SHIPPED | OUTPATIENT
Start: 2022-07-20 | End: 2022-10-17 | Stop reason: SDUPTHER

## 2022-07-20 NOTE — TELEPHONE ENCOUNTER
Rx Refill Note  Requested Prescriptions     Pending Prescriptions Disp Refills   • clonazePAM (KlonoPIN) 0.5 MG tablet 105 tablet 2     Si/2 in AM AND 3  AT NIGHT PRN ANXIETY/insomnia      Last office visit with prescribing clinician: 2022      Next office visit with prescribing clinician: 2022   :23}  Theresa Mahan MA  22, 10:56 EDT

## 2022-07-20 NOTE — TELEPHONE ENCOUNTER
Caller: Juana Lopez    Relationship: Self    Best call back number: [phone]       Requested Prescriptions:   Requested Prescriptions     Pending Prescriptions Disp Refills   • clonazePAM (KlonoPIN) 0.5 MG tablet 105 tablet 2     Si/2 in AM AND 3  AT NIGHT PRN ANXIETY/insomnia        Pharmacy where request should be sent: [facility] #[id number] - [place], [place] - [address] . - [phone]  - [phone] FX     Additional details provided by patient: PATIENT HAS A 1 WEEK SUPPLY LEFT OF THIS MEDICATION     Does the patient have less than a 3 day supply:  [] Yes  [x] No    Fito Muñoz Rep   22 10:50 EDT

## 2022-08-09 ENCOUNTER — TELEMEDICINE (OUTPATIENT)
Dept: FAMILY MEDICINE CLINIC | Facility: CLINIC | Age: 52
End: 2022-08-09

## 2022-08-09 DIAGNOSIS — N95.1 HOT FLASHES DUE TO MENOPAUSE: ICD-10-CM

## 2022-08-09 DIAGNOSIS — F32.1 CURRENT MODERATE EPISODE OF MAJOR DEPRESSIVE DISORDER WITHOUT PRIOR EPISODE: ICD-10-CM

## 2022-08-09 DIAGNOSIS — F41.1 GENERALIZED ANXIETY DISORDER: Primary | ICD-10-CM

## 2022-08-09 PROBLEM — F32.9 CURRENT EPISODE OF MAJOR DEPRESSIVE DISORDER WITHOUT PRIOR EPISODE: Status: ACTIVE | Noted: 2022-08-09

## 2022-08-09 PROCEDURE — 99214 OFFICE O/P EST MOD 30 MIN: CPT | Performed by: FAMILY MEDICINE

## 2022-08-09 RX ORDER — VENLAFAXINE HYDROCHLORIDE 37.5 MG/1
37.5 CAPSULE, EXTENDED RELEASE ORAL DAILY
Qty: 30 CAPSULE | Refills: 5 | Status: SHIPPED | OUTPATIENT
Start: 2022-08-09 | End: 2022-09-07 | Stop reason: SDUPTHER

## 2022-08-09 NOTE — PROGRESS NOTES
F/U depression    Subjective   Media ZINA Lopez is a 52 y.o. female.     History of Present Illness   Video  Visit due to patient unable to come to the office.  Consent obtained.  8 minute visit.    C/o toruble with mood issues and hot flashes.  Mood is low at times.  No help with wellbutrin tried by gyn.    F/U ARCENIO.  Doing well with clonazepam as taking.    The following portions of the patient's history were reviewed and updated as appropriate: allergies, current medications, past family history, past medical history, past social history, past surgical history and problem list.    Review of Systems   Constitutional: Negative for fatigue.   Respiratory: Negative for cough and shortness of breath.    Psychiatric/Behavioral: Positive for depressed mood.       Patient Active Problem List   Diagnosis   • Back pain   • Anxiety and depression   • Goiter   • Fatigue   • Fibromyalgia   • Herpes zoster   • Insomnia   • Iron deficiency   • Myalgia   • Vitamin B12 deficiency   • Vitamin D deficiency   • History of MRSA infection   • Large bowel obstruction (HCC)   • Seasonal allergies   • GERD without esophagitis   • Hypertriglyceridemia   • Generalized anxiety disorder   • Malabsorption   • Gastroenteritis   • High risk medication use   • Upper respiratory tract infection due to COVID-19 virus   • Encounter for annual health examination   • Encounter for hepatitis C screening test for low risk patient   • Perforation of right tympanic membrane   • Upper respiratory infection, acute   • Current episode of major depressive disorder without prior episode   • Hot flashes due to menopause       No Known Allergies      Current Outpatient Medications:   •  Bioflavonoid Products (VITAMIN C) chewable tablet, Chew 240 mg 2 (Two) Times a Day., Disp: , Rfl:   •  Cholecalciferol (VITAMIN D3) 5000 UNITS capsule capsule, Take 5,000 Units by mouth daily., Disp: , Rfl:   •  clonazePAM (KlonoPIN) 0.5 MG tablet, 1/2 in AM AND 3  AT NIGHT PRN  ANXIETY/insomnia, Disp: 105 tablet, Rfl: 2  •  diclofenac (VOLTAREN) 1 % gel gel, Place 4 g on the skin as directed by provider 4 (Four) Times a Day., Disp: , Rfl:   •  estradiol (MINIVELLE, VIVELLE-DOT) 0.05 MG/24HR patch, APPLY ONE PATCH TO SKIN TWO TIMES A WEEK, Disp: , Rfl:   •  magnesium gluconate (MAGONATE) 500 MG tablet, Take 27 mg by mouth Daily., Disp: , Rfl:   •  medroxyPROGESTERone (PROVERA) 2.5 MG tablet, Take 2.5 mg by mouth Daily., Disp: , Rfl:   •  omeprazole OTC (PriLOSEC OTC) 20 MG EC tablet, Take 20 mg by mouth daily., Disp: , Rfl:   •  Pediatric Multiple Vit-C-FA (FLINSTONES GUMMIES OMEGA-3 DHA) chewable tablet, Chew 2 tablets Daily., Disp: , Rfl:   •  phentermine (ADIPEX-P) 37.5 MG tablet, Take 37.5 mg by mouth Every Morning., Disp: , Rfl:   •  venlafaxine XR (Effexor XR) 37.5 MG 24 hr capsule, Take 1 capsule by mouth Daily., Disp: 30 capsule, Rfl: 5  •  vitamin B-12 (CYANOCOBALAMIN) 1000 MCG tablet, Take 1 tablet by mouth Daily., Disp: 90 tablet, Rfl: 2    Current Facility-Administered Medications:   •  cyanocobalamin injection 1,000 mcg, 1,000 mcg, Intramuscular, Q28 Days, Gamal Gamez MD, 1,000 mcg at 03/18/20 1555    Past Medical History:   Diagnosis Date   • Acute upper respiratory infection    • Anemia    • Anxiety    • Arthritis    • Depression    • External hemorrhoid, thrombosed    • Fibromyalgia    • Folliculitis    • ARCENIO (generalized anxiety disorder)    • Gastroparesis    • GERD (gastroesophageal reflux disease)    • GERD without esophagitis    • Heartburn    • Hemorrhoids    • High risk medication use    • History of staph infection     LAST TIME 4 YEARS AGO INSIDE RIGHT NARIS   • Hypertriglyceridemia    • Immunization deficiency    • Infected prosthetic mesh of abdominal wall (HCC)    • Insomnia    • MRSA carrier    • Persistent insomnia    • Pica    • PONV (postoperative nausea and vomiting)    • Shingles    • Strain of rectus abdominis muscle    • Vitamin B12 deficiency     • Volvulus of intestine (HCC)        Past Surgical History:   Procedure Laterality Date   • ABDOMINOPLASTY     • BACK SURGERY  09/2015    [facility] Back Surgery[facility]by [facility]   • BARIATRIC SURGERY     • BILATERAL BREAST REDUCTION Bilateral 10/2021   • CHOLECYSTECTOMY     • COLECTOMY PARTIAL / TOTAL Right     Right Hemicolectomy   • COLONOSCOPY N/A 07/12/2016    Procedure: COLONOSCOPY into small bowel;  Surgeon: Riley Narayan MD;  Location: [facility] ENDOSCOPY;  Service:    • EXPLORATORY LAPAROTOMY N/A 04/20/2016    Procedure: LAPAROTOMY EXPLORATORY, POSSIBLE BOWEL RESECTION, POSSIBLE OSTOMY;  Surgeon: Riley Narayan MD;  Location: [facility] MAIN OR;  Service:    • EXPLORATORY LAPAROTOMY N/A 12/02/2016    Procedure: exploratory laparotomy, removal of  infected abdominal wall mesh. ;  Surgeon: Riley Narayan MD;  Location: [facility] MAIN OR;  Service:    • GASTRIC BYPASS  2000   • HIP ARTHROSCOPY W/ LABRAL REPAIR Left    • LAPAROSCOPIC TUBAL LIGATION     • OTHER SURGICAL HISTORY      LASER ON BACK   • UMBILICAL HERNIA REPAIR         Family History   Problem Relation Age of Onset   • Heart attack Father    • Diabetes Father    • Stroke Father    • Pancreatitis Father    • Diabetes Sister    • Breast cancer Maternal Grandmother    • Diabetes Paternal Grandmother    • Osteoporosis Paternal Grandmother    • Diabetes Paternal Grandfather    • Arthritis Mother    • No Known Problems Other        Social History     Tobacco Use   • Smoking status: Former Smoker   • Smokeless tobacco: Never Used   • Tobacco comment: smoked less than 1 pack per day for 10 year   Substance Use Topics   • Alcohol use: Yes     Comment: once every couple of months-7 or less drinks per week            Objective     There were no vitals filed for this visit.  There is no height or weight on file to calculate BMI.    Physical Exam  Constitutional:       Appearance: She is well-developed.   HENT:      Head:  Normocephalic and atraumatic.   Pulmonary:      Breath sounds: Normal breath sounds.   Neurological:      Mental Status: She is alert and oriented to person, place, and time.   Psychiatric:         Behavior: Behavior normal.         Thought Content: Thought content normal.         Judgment: Judgment normal.         Lab Results   Component Value Date    GLUCOSE 110 (H) 05/02/2022    BUN 11 05/02/2022    CREATININE 0.67 05/02/2022    EGFRIFNONA 74 08/13/2020    EGFRIFAFRI 90 08/13/2020    BCR 16 05/02/2022    K 4.6 05/02/2022    CO2 21 05/02/2022    CALCIUM 9.0 05/02/2022    PROTENTOTREF 6.8 05/02/2022    ALBUMIN 4.4 05/02/2022    LABIL2 1.8 05/02/2022    AST 13 05/02/2022    ALT 15 05/02/2022       WBC   Date Value Ref Range Status   05/02/2022 6.7 3.4 - 10.8 x10E3/uL Final     RBC   Date Value Ref Range Status   05/02/2022 4.22 3.77 - 5.28 x10E6/uL Final     Hemoglobin   Date Value Ref Range Status   05/02/2022 12.5 11.1 - 15.9 g/dL Final   12/03/2016 9.1 (L) 11.9 - 15.5 g/dL Final     Hematocrit   Date Value Ref Range Status   05/02/2022 39.2 34.0 - 46.6 % Final   12/03/2016 30.2 (L) 35.6 - 45.5 % Final     MCV   Date Value Ref Range Status   05/02/2022 93 79 - 97 fL Final   12/03/2016 88.0 80.5 - 98.2 fL Final     MCH   Date Value Ref Range Status   05/02/2022 29.6 26.6 - 33.0 pg Final   12/03/2016 26.5 (L) 26.9 - 32.0 pg Final     MCHC   Date Value Ref Range Status   05/02/2022 31.9 31.5 - 35.7 g/dL Final   12/03/2016 30.1 (L) 32.4 - 36.3 g/dL Final     RDW   Date Value Ref Range Status   05/02/2022 12.6 11.7 - 15.4 % Final   12/03/2016 14.4 (H) 11.7 - 13.0 % Final     RDW-SD   Date Value Ref Range Status   12/03/2016 46.7 37.0 - 54.0 fl Final     MPV   Date Value Ref Range Status   12/03/2016 10.2 6.0 - 12.0 fL Final     Platelets   Date Value Ref Range Status   05/02/2022 267 150 - 450 x10E3/uL Final   12/03/2016 277 140 - 500 10*3/mm3 Final     Neutrophil Rel %   Date Value Ref Range Status   05/02/2022 63 Not  Estab. % Final     Neutrophil %   Date Value Ref Range Status   12/03/2016 83.8 (H) 42.7 - 76.0 % Final     Lymphocyte Rel %   Date Value Ref Range Status   05/02/2022 25 Not Estab. % Final     Lymphocyte %   Date Value Ref Range Status   12/03/2016 7.9 (L) 19.6 - 45.3 % Final     Monocyte Rel %   Date Value Ref Range Status   05/02/2022 6 Not Estab. % Final     Monocyte %   Date Value Ref Range Status   12/03/2016 5.7 5.0 - 12.0 % Final     Eosinophil Rel %   Date Value Ref Range Status   05/02/2022 5 Not Estab. % Final     Eosinophil %   Date Value Ref Range Status   12/03/2016 2.3 0.3 - 6.2 % Final     Basophil Rel %   Date Value Ref Range Status   05/02/2022 1 Not Estab. % Final     Basophil %   Date Value Ref Range Status   12/03/2016 0.1 0.0 - 1.5 % Final     Immature Grans %   Date Value Ref Range Status   12/03/2016 0.2 0.0 - 0.5 % Final     Neutrophils Absolute   Date Value Ref Range Status   05/02/2022 4.3 1.4 - 7.0 x10E3/uL Final     Neutrophils, Absolute   Date Value Ref Range Status   12/03/2016 10.19 (H) 1.90 - 8.10 10*3/mm3 Final     Lymphocytes Absolute   Date Value Ref Range Status   05/02/2022 1.7 0.7 - 3.1 x10E3/uL Final     Lymphocytes, Absolute   Date Value Ref Range Status   12/03/2016 0.96 0.90 - 4.80 10*3/mm3 Final     Monocytes Absolute   Date Value Ref Range Status   05/02/2022 0.4 0.1 - 0.9 x10E3/uL Final     Monocytes, Absolute   Date Value Ref Range Status   12/03/2016 0.69 0.20 - 1.20 10*3/mm3 Final     Eosinophils Absolute   Date Value Ref Range Status   05/02/2022 0.3 0.0 - 0.4 x10E3/uL Final     Eosinophils, Absolute   Date Value Ref Range Status   12/03/2016 0.28 0.00 - 0.70 10*3/mm3 Final     Basophils Absolute   Date Value Ref Range Status   05/02/2022 0.1 0.0 - 0.2 x10E3/uL Final     Basophils, Absolute   Date Value Ref Range Status   12/03/2016 0.01 0.00 - 0.20 10*3/mm3 Final     Immature Grans, Absolute   Date Value Ref Range Status   12/03/2016 0.03 0.00 - 0.03 10*3/mm3 Final      nRBC   Date Value Ref Range Status   08/13/2020 0.0 0.0 - 0.2 /100 WBC Final       No results found for: HGBA1C    Lab Results   Component Value Date    TYLCUNQK51 626 05/02/2022       TSH   Date Value Ref Range Status   08/13/2020 1.660 0.270 - 4.200 uIU/mL Final       No results found for: CHOL  Lab Results   Component Value Date    TRIG 157 (H) 05/02/2022     Lab Results   Component Value Date    HDL 58 05/02/2022     Lab Results   Component Value Date    LDL 98 05/02/2022     Lab Results   Component Value Date    VLDL 27 05/02/2022     No results found for: LDLHDL      Procedures    Assessment & Plan   Problems Addressed this Visit     Current episode of major depressive disorder without prior episode    Relevant Medications    venlafaxine XR (Effexor XR) 37.5 MG 24 hr capsule    Generalized anxiety disorder - Primary    Relevant Medications    venlafaxine XR (Effexor XR) 37.5 MG 24 hr capsule    Hot flashes due to menopause    Relevant Medications    venlafaxine XR (Effexor XR) 37.5 MG 24 hr capsule      Diagnoses       Codes Comments    Generalized anxiety disorder    -  Primary ICD-10-CM: F41.1  ICD-9-CM: 300.02     Hot flashes due to menopause     ICD-10-CM: N95.1  ICD-9-CM: 627.2     Current moderate episode of major depressive disorder without prior episode (HCC)     ICD-10-CM: F32.1  ICD-9-CM: 296.22       Depression with hot flashes.  Uncontrolled.  New problems.  Effexor XR 37.5 a day.   ARCENIO.  Controlled.  Continue clonazepam.      No orders of the defined types were placed in this encounter.      Current Outpatient Medications   Medication Sig Dispense Refill   • Bioflavonoid Products (VITAMIN C) chewable tablet Chew 240 mg 2 (Two) Times a Day.     • Cholecalciferol (VITAMIN D3) 5000 UNITS capsule capsule Take 5,000 Units by mouth daily.     • clonazePAM (KlonoPIN) 0.5 MG tablet 1/2 in AM AND 3  AT NIGHT PRN ANXIETY/insomnia 105 tablet 2   • diclofenac (VOLTAREN) 1 % gel gel Place 4 g on the skin  as directed by provider 4 (Four) Times a Day.     • estradiol (MINIVELLE, VIVELLE-DOT) 0.05 MG/24HR patch APPLY ONE PATCH TO SKIN TWO TIMES A WEEK     • magnesium gluconate (MAGONATE) 500 MG tablet Take 27 mg by mouth Daily.     • medroxyPROGESTERone (PROVERA) 2.5 MG tablet Take 2.5 mg by mouth Daily.     • omeprazole OTC (PriLOSEC OTC) 20 MG EC tablet Take 20 mg by mouth daily.     • Pediatric Multiple Vit-C-FA (FLINSTONES GUMMIES OMEGA-3 DHA) chewable tablet Chew 2 tablets Daily.     • phentermine (ADIPEX-P) 37.5 MG tablet Take 37.5 mg by mouth Every Morning.     • venlafaxine XR (Effexor XR) 37.5 MG 24 hr capsule Take 1 capsule by mouth Daily. 30 capsule 5   • vitamin B-12 (CYANOCOBALAMIN) 1000 MCG tablet Take 1 tablet by mouth Daily. 90 tablet 2     Current Facility-Administered Medications   Medication Dose Route Frequency Provider Last Rate Last Admin   • cyanocobalamin injection 1,000 mcg  1,000 mcg Intramuscular Q28 Days Gamal Gamez MD   1,000 mcg at 03/18/20 1555       Juana Lopez had no medications administered during this visit.    Return in about 3 months (around 11/9/2022).    There are no Patient Instructions on file for this visit.

## 2022-09-07 DIAGNOSIS — N95.1 HOT FLASHES DUE TO MENOPAUSE: ICD-10-CM

## 2022-09-07 DIAGNOSIS — F32.1 CURRENT MODERATE EPISODE OF MAJOR DEPRESSIVE DISORDER WITHOUT PRIOR EPISODE: ICD-10-CM

## 2022-09-08 RX ORDER — VENLAFAXINE HYDROCHLORIDE 37.5 MG/1
37.5 CAPSULE, EXTENDED RELEASE ORAL DAILY
Qty: 30 CAPSULE | Refills: 5 | Status: SHIPPED | OUTPATIENT
Start: 2022-09-08 | End: 2022-10-05 | Stop reason: SDUPTHER

## 2022-09-29 ENCOUNTER — TELEPHONE (OUTPATIENT)
Dept: FAMILY MEDICINE CLINIC | Facility: CLINIC | Age: 52
End: 2022-09-29

## 2022-09-29 NOTE — TELEPHONE ENCOUNTER
Caller: Juana Lopez    Relationship: Self    Best call back number: [phone]    What is the best time to reach you: ANYTIME     Who are you requesting to speak with (clinical staff, provider,  specific staff member): CLINICAL     What was the call regarding: PATIENT STATED WITHIN THE PAST FEW WEEKS SHE IS HAVING ON GOING LEFT HIP PAIN IN THE SAME SPOT SHE HAD SURGERY A FEW YEARS BACK.     PATIENT WOULD LIKE TO KNOW IF DR. KING THINKS SHE NEEDS TO BE SEEN IN OFFICE AND HAVE SOME TEST ORDERED TO HELP RESOLVE PROBLEM OR IF DR. PALAFOX NEEDS TO JUST REFER PATIENT TO A SPECIALIST FOR PLAN OF CARE.     PLEASE CALL TO DISCUSS AND ADVISE.

## 2022-10-05 ENCOUNTER — OFFICE VISIT (OUTPATIENT)
Dept: FAMILY MEDICINE CLINIC | Facility: CLINIC | Age: 52
End: 2022-10-05

## 2022-10-05 VITALS
OXYGEN SATURATION: 95 % | RESPIRATION RATE: 16 BRPM | HEART RATE: 93 BPM | DIASTOLIC BLOOD PRESSURE: 82 MMHG | BODY MASS INDEX: 33.57 KG/M2 | SYSTOLIC BLOOD PRESSURE: 124 MMHG | WEIGHT: 196.6 LBS | HEIGHT: 64 IN | TEMPERATURE: 97.8 F

## 2022-10-05 DIAGNOSIS — Z28.39 IMMUNIZATION DEFICIENCY: ICD-10-CM

## 2022-10-05 DIAGNOSIS — M70.62 GREATER TROCHANTERIC BURSITIS OF LEFT HIP: ICD-10-CM

## 2022-10-05 DIAGNOSIS — F41.1 GENERALIZED ANXIETY DISORDER: ICD-10-CM

## 2022-10-05 DIAGNOSIS — N95.1 HOT FLASHES DUE TO MENOPAUSE: ICD-10-CM

## 2022-10-05 DIAGNOSIS — F32.1 CURRENT MODERATE EPISODE OF MAJOR DEPRESSIVE DISORDER WITHOUT PRIOR EPISODE: Primary | ICD-10-CM

## 2022-10-05 PROCEDURE — 0124A COVID-19 (PFIZER) BIVALENT BOOSTER 12+YRS: CPT | Performed by: FAMILY MEDICINE

## 2022-10-05 PROCEDURE — 90471 IMMUNIZATION ADMIN: CPT | Performed by: FAMILY MEDICINE

## 2022-10-05 PROCEDURE — 91312 COVID-19 (PFIZER) BIVALENT BOOSTER 12+YRS: CPT | Performed by: FAMILY MEDICINE

## 2022-10-05 PROCEDURE — 99214 OFFICE O/P EST MOD 30 MIN: CPT | Performed by: FAMILY MEDICINE

## 2022-10-05 PROCEDURE — 90686 IIV4 VACC NO PRSV 0.5 ML IM: CPT | Performed by: FAMILY MEDICINE

## 2022-10-05 RX ORDER — CELECOXIB 200 MG/1
200 CAPSULE ORAL DAILY
Qty: 14 CAPSULE | Refills: 0 | Status: SHIPPED | OUTPATIENT
Start: 2022-10-05 | End: 2022-10-12 | Stop reason: SDUPTHER

## 2022-10-05 RX ORDER — VENLAFAXINE HYDROCHLORIDE 37.5 MG/1
37.5 CAPSULE, EXTENDED RELEASE ORAL DAILY
Qty: 90 CAPSULE | Refills: 3 | Status: SHIPPED | OUTPATIENT
Start: 2022-10-05 | End: 2023-02-13 | Stop reason: SDUPTHER

## 2022-10-05 NOTE — PROGRESS NOTES
Chief Complaint   Patient presents with   • Hip Pain   • Fall       Subjective   Media ZINA Lopez is a 52 y.o. female.     History of Present Illness   C/o a few months history of L hip pain.  No better.  Had L hip labral tear repair 6-7 years ago.   No injury.  Lateral hip area.    F/U ARCENIO.  Doing wel lwith clonazepam.  No se.  F/U depression.  Doing well with meds  The following portions of the patient's history were reviewed and updated as appropriate: allergies, current medications, past family history, past medical history, past social history, past surgical history and problem list.    Review of Systems   Constitutional: Negative for appetite change and fatigue.   HENT: Negative for nosebleeds and sore throat.    Eyes: Negative for blurred vision and visual disturbance.   Respiratory: Negative for shortness of breath and wheezing.    Cardiovascular: Negative for chest pain and leg swelling.   Gastrointestinal: Negative for abdominal distention and abdominal pain.   Endocrine: Negative for cold intolerance and polyuria.   Genitourinary: Negative for dysuria and hematuria.   Musculoskeletal: Negative for arthralgias and myalgias.   Skin: Negative for color change and rash.   Neurological: Negative for weakness and confusion.   Psychiatric/Behavioral: Negative for agitation and depressed mood.       Patient Active Problem List   Diagnosis   • Back pain   • Anxiety and depression   • Goiter   • Fatigue   • Fibromyalgia   • Herpes zoster   • Insomnia   • Iron deficiency   • Myalgia   • Vitamin B12 deficiency   • Vitamin D deficiency   • History of MRSA infection   • Large bowel obstruction (HCC)   • Seasonal allergies   • GERD without esophagitis   • Hypertriglyceridemia   • Generalized anxiety disorder   • Malabsorption   • Gastroenteritis   • High risk medication use   • Upper respiratory tract infection due to COVID-19 virus   • Encounter for annual health examination   • Encounter for hepatitis C screening test  for low risk patient   • Perforation of right tympanic membrane   • Upper respiratory infection, acute   • Current episode of major depressive disorder without prior episode   • Hot flashes due to menopause   • Greater trochanteric bursitis of left hip   • Immunization deficiency       No Known Allergies      Current Outpatient Medications:   •  Bioflavonoid Products (VITAMIN C) chewable tablet, Chew 240 mg 2 (Two) Times a Day., Disp: , Rfl:   •  Cholecalciferol (VITAMIN D3) 5000 UNITS capsule capsule, Take 5,000 Units by mouth daily., Disp: , Rfl:   •  clonazePAM (KlonoPIN) 0.5 MG tablet, 1/2 in AM AND 3  AT NIGHT PRN ANXIETY/insomnia, Disp: 105 tablet, Rfl: 2  •  diclofenac (VOLTAREN) 1 % gel gel, Place 4 g on the skin as directed by provider 4 (Four) Times a Day., Disp: , Rfl:   •  estradiol (MINIVELLE, VIVELLE-DOT) 0.05 MG/24HR patch, APPLY ONE PATCH TO SKIN TWO TIMES A WEEK, Disp: , Rfl:   •  medroxyPROGESTERone (PROVERA) 2.5 MG tablet, Take 2.5 mg by mouth Daily., Disp: , Rfl:   •  omeprazole OTC (PriLOSEC OTC) 20 MG EC tablet, Take 20 mg by mouth daily., Disp: , Rfl:   •  phentermine (ADIPEX-P) 37.5 MG tablet, Take 37.5 mg by mouth Every Morning., Disp: , Rfl:   •  venlafaxine XR (Effexor XR) 37.5 MG 24 hr capsule, Take 1 capsule by mouth Daily., Disp: 90 capsule, Rfl: 3  •  vitamin B-12 (CYANOCOBALAMIN) 1000 MCG tablet, Take 1 tablet by mouth Daily., Disp: 90 tablet, Rfl: 2  •  celecoxib (CeleBREX) 200 MG capsule, Take 1 capsule by mouth Daily., Disp: 14 capsule, Rfl: 0  •  magnesium gluconate (MAGONATE) 500 MG tablet, Take 27 mg by mouth Daily., Disp: , Rfl:     Current Facility-Administered Medications:   •  cyanocobalamin injection 1,000 mcg, 1,000 mcg, Intramuscular, Q28 Days, Gamal Gamez MD, 1,000 mcg at 03/18/20 1555    Past Medical History:   Diagnosis Date   • Acute upper respiratory infection    • Anemia    • Anxiety    • Arthritis    • Depression    • External hemorrhoid, thrombosed    •  Fibromyalgia    • Folliculitis    • ARCENIO (generalized anxiety disorder)    • Gastroparesis    • GERD (gastroesophageal reflux disease)    • GERD without esophagitis    • Heartburn    • Hemorrhoids    • High risk medication use    • History of staph infection     LAST TIME 4 YEARS AGO INSIDE RIGHT NARIS   • Hypertriglyceridemia    • Immunization deficiency    • Infected prosthetic mesh of abdominal wall (HCC)    • Insomnia    • MRSA carrier    • Persistent insomnia    • Pica    • PONV (postoperative nausea and vomiting)    • Shingles    • Strain of rectus abdominis muscle    • Vitamin B12 deficiency    • Volvulus of intestine (HCC)        Past Surgical History:   Procedure Laterality Date   • ABDOMINOPLASTY     • BACK SURGERY  09/2015    [facility]by [facility]   • BARIATRIC SURGERY     • BILATERAL BREAST REDUCTION Bilateral 10/2021   • CHOLECYSTECTOMY     • COLECTOMY PARTIAL / TOTAL Right     Right Hemicolectomy   • COLONOSCOPY N/A 07/12/2016    Procedure: COLONOSCOPY into small bowel;  Surgeon: Riley Narayan MD;  Location: [facility] ENDOSCOPY;  Service:    • EXPLORATORY LAPAROTOMY N/A 04/20/2016    Procedure: LAPAROTOMY EXPLORATORY, POSSIBLE BOWEL RESECTION, POSSIBLE OSTOMY;  Surgeon: Riley Narayan MD;  Location: [facility] MAIN OR;  Service:    • EXPLORATORY LAPAROTOMY N/A 12/02/2016    Procedure: exploratory laparotomy, removal of  infected abdominal wall mesh. ;  Surgeon: Riley Narayan MD;  Location: [facility] MAIN OR;  Service:    • GASTRIC BYPASS  2000   • HIP ARTHROSCOPY W/ LABRAL REPAIR Left    • LAPAROSCOPIC TUBAL LIGATION     • OTHER SURGICAL HISTORY      LASER ON BACK   • UMBILICAL HERNIA REPAIR         Family History   Problem Relation Age of Onset   • Heart attack Father    • Diabetes Father    • Stroke Father    • Pancreatitis Father    • Diabetes Sister    • Breast cancer Maternal Grandmother    • Diabetes Paternal Grandmother    • Osteoporosis Paternal  Grandmother    • Diabetes Paternal Grandfather    • Arthritis Mother    • No Known Problems Other        Social History     Tobacco Use   • Smoking status: Former Smoker   • Smokeless tobacco: Never Used   • Tobacco comment: smoked less than 1 pack per day for 10 year   Substance Use Topics   • Alcohol use: Yes     Comment: once every couple of months-7 or less drinks per week            Objective     Vitals:    10/05/22 1335   BP: 124/82   Pulse: 93   Resp: 16   Temp: 97.8 °F (36.6 °C)   SpO2: 95%     Body mass index is 33.75 kg/m².    Physical Exam  Vitals reviewed.   Constitutional:       Appearance: She is well-developed. She is not diaphoretic.   HENT:      Head: Normocephalic and atraumatic.   Eyes:      General: No scleral icterus.     Pupils: Pupils are equal, round, and reactive to light.   Neck:      Thyroid: No thyromegaly.   Cardiovascular:      Rate and Rhythm: Normal rate and regular rhythm.      Heart sounds: No murmur heard.    No friction rub. No gallop.   Pulmonary:      Effort: Pulmonary effort is normal. No respiratory distress.      Breath sounds: No wheezing or rales.   Chest:      Chest wall: No tenderness.   Abdominal:      General: Bowel sounds are normal. There is no distension.      Palpations: Abdomen is soft.      Tenderness: There is no abdominal tenderness.   Musculoskeletal:         General: Tenderness present. No deformity. Normal range of motion.      Comments: TTP over L greater troch .  L hip FROM   Lymphadenopathy:      Cervical: No cervical adenopathy.   Skin:     General: Skin is warm and dry.      Findings: No rash.   Neurological:      Cranial Nerves: No cranial nerve deficit.      Motor: No abnormal muscle tone.         Lab Results   Component Value Date    GLUCOSE 110 (H) 05/02/2022    BUN 11 05/02/2022    CREATININE 0.67 05/02/2022    EGFRIFNONA 74 08/13/2020    EGFRIFAFRI 90 08/13/2020    BCR 16 05/02/2022    K 4.6 05/02/2022    CO2 21 05/02/2022    CALCIUM 9.0 05/02/2022     PROTENTOTREF 6.8 05/02/2022    ALBUMIN 4.4 05/02/2022    LABIL2 1.8 05/02/2022    AST 13 05/02/2022    ALT 15 05/02/2022       WBC   Date Value Ref Range Status   05/02/2022 6.7 3.4 - 10.8 x10E3/uL Final     RBC   Date Value Ref Range Status   05/02/2022 4.22 3.77 - 5.28 x10E6/uL Final     Hemoglobin   Date Value Ref Range Status   05/02/2022 12.5 11.1 - 15.9 g/dL Final   12/03/2016 9.1 (L) 11.9 - 15.5 g/dL Final     Hematocrit   Date Value Ref Range Status   05/02/2022 39.2 34.0 - 46.6 % Final   12/03/2016 30.2 (L) 35.6 - 45.5 % Final     MCV   Date Value Ref Range Status   05/02/2022 93 79 - 97 fL Final   12/03/2016 88.0 80.5 - 98.2 fL Final     MCH   Date Value Ref Range Status   05/02/2022 29.6 26.6 - 33.0 pg Final   12/03/2016 26.5 (L) 26.9 - 32.0 pg Final     MCHC   Date Value Ref Range Status   05/02/2022 31.9 31.5 - 35.7 g/dL Final   12/03/2016 30.1 (L) 32.4 - 36.3 g/dL Final     RDW   Date Value Ref Range Status   05/02/2022 12.6 11.7 - 15.4 % Final   12/03/2016 14.4 (H) 11.7 - 13.0 % Final     RDW-SD   Date Value Ref Range Status   12/03/2016 46.7 37.0 - 54.0 fl Final     MPV   Date Value Ref Range Status   12/03/2016 10.2 6.0 - 12.0 fL Final     Platelets   Date Value Ref Range Status   05/02/2022 267 150 - 450 x10E3/uL Final   12/03/2016 277 140 - 500 10*3/mm3 Final     Neutrophil Rel %   Date Value Ref Range Status   05/02/2022 63 Not Estab. % Final     Neutrophil %   Date Value Ref Range Status   12/03/2016 83.8 (H) 42.7 - 76.0 % Final     Lymphocyte Rel %   Date Value Ref Range Status   05/02/2022 25 Not Estab. % Final     Lymphocyte %   Date Value Ref Range Status   12/03/2016 7.9 (L) 19.6 - 45.3 % Final     Monocyte Rel %   Date Value Ref Range Status   05/02/2022 6 Not Estab. % Final     Monocyte %   Date Value Ref Range Status   12/03/2016 5.7 5.0 - 12.0 % Final     Eosinophil Rel %   Date Value Ref Range Status   05/02/2022 5 Not Estab. % Final     Eosinophil %   Date Value Ref Range Status    12/03/2016 2.3 0.3 - 6.2 % Final     Basophil Rel %   Date Value Ref Range Status   05/02/2022 1 Not Estab. % Final     Basophil %   Date Value Ref Range Status   12/03/2016 0.1 0.0 - 1.5 % Final     Immature Grans %   Date Value Ref Range Status   12/03/2016 0.2 0.0 - 0.5 % Final     Neutrophils Absolute   Date Value Ref Range Status   05/02/2022 4.3 1.4 - 7.0 x10E3/uL Final     Neutrophils, Absolute   Date Value Ref Range Status   12/03/2016 10.19 (H) 1.90 - 8.10 10*3/mm3 Final     Lymphocytes Absolute   Date Value Ref Range Status   05/02/2022 1.7 0.7 - 3.1 x10E3/uL Final     Lymphocytes, Absolute   Date Value Ref Range Status   12/03/2016 0.96 0.90 - 4.80 10*3/mm3 Final     Monocytes Absolute   Date Value Ref Range Status   05/02/2022 0.4 0.1 - 0.9 x10E3/uL Final     Monocytes, Absolute   Date Value Ref Range Status   12/03/2016 0.69 0.20 - 1.20 10*3/mm3 Final     Eosinophils Absolute   Date Value Ref Range Status   05/02/2022 0.3 0.0 - 0.4 x10E3/uL Final     Eosinophils, Absolute   Date Value Ref Range Status   12/03/2016 0.28 0.00 - 0.70 10*3/mm3 Final     Basophils Absolute   Date Value Ref Range Status   05/02/2022 0.1 0.0 - 0.2 x10E3/uL Final     Basophils, Absolute   Date Value Ref Range Status   12/03/2016 0.01 0.00 - 0.20 10*3/mm3 Final     Immature Grans, Absolute   Date Value Ref Range Status   12/03/2016 0.03 0.00 - 0.03 10*3/mm3 Final     nRBC   Date Value Ref Range Status   08/13/2020 0.0 0.0 - 0.2 /100 WBC Final       No results found for: HGBA1C    Lab Results   Component Value Date    XUHNYHOY10 626 05/02/2022       TSH   Date Value Ref Range Status   08/13/2020 1.660 0.270 - 4.200 uIU/mL Final       No results found for: CHOL  Lab Results   Component Value Date    TRIG 157 (H) 05/02/2022     Lab Results   Component Value Date    HDL 58 05/02/2022     Lab Results   Component Value Date    LDL 98 05/02/2022     Lab Results   Component Value Date    VLDL 27 05/02/2022     No results found for:  [facility]      Procedures    Assessment & Plan   Problems Addressed this Visit     Current episode of major depressive disorder without prior episode - Primary    Relevant Medications    venlafaxine XR (Effexor XR) 37.5 MG 24 hr capsule    Generalized anxiety disorder    Relevant Medications    venlafaxine XR (Effexor XR) 37.5 MG 24 hr capsule    Greater trochanteric bursitis of left hip    Relevant Medications    celecoxib (CeleBREX) 200 MG capsule    Other Relevant Orders    Ambulatory Referral to Orthopedic Surgery    Hot flashes due to menopause    Relevant Medications    venlafaxine XR (Effexor XR) 37.5 MG 24 hr capsule    Immunization deficiency    Relevant Orders    FluLaval/Fluarix/Fluzone >6 Months    COVID-19 Bivalent Booster (Pfizer) 12+yrs      Diagnoses       Codes Comments    Current moderate episode of major depressive disorder without prior episode (HCC)    -  Primary ICD-10-CM: F32.1  ICD-9-CM: 296.22     Greater trochanteric bursitis of left hip     ICD-10-CM: M70.62  ICD-9-CM: 726.5     Generalized anxiety disorder     ICD-10-CM: F41.1  ICD-9-CM: 300.02     Immunization deficiency     ICD-10-CM: Z28.39  ICD-9-CM: V15.83     Hot flashes due to menopause     ICD-10-CM: N95.1  ICD-9-CM: 627.2       Depression.  Controlle.d  RF effexor.    ARCENIO. Controlle.d  Continue clonazepam.  CARLOS ALBERTO appropriate.   L greater trochanteric bursitis.  Celebrex 200 a day for 2 weeks.  To ortho.      Orders Placed This Encounter   Procedures   • FluLaval/Fluarix/Fluzone >6 Months   • COVID-19 Bivalent Booster (Pfizer) 12+yrs   • Ambulatory Referral to Orthopedic Surgery     Referral Priority:   Routine     Referral Type:   Consultation     Referral Reason:   Specialty Services Required     Referred to Provider:   Jenae Garcias MD     Requested Specialty:   Orthopedic Surgery     Number of Visits Requested:   1       Current Outpatient Medications   Medication Sig Dispense Refill   • Bioflavonoid Products (VITAMIN C)  chewable tablet Chew 240 mg 2 (Two) Times a Day.     • Cholecalciferol (VITAMIN D3) 5000 UNITS capsule capsule Take 5,000 Units by mouth daily.     • clonazePAM (KlonoPIN) 0.5 MG tablet 1/2 in AM AND 3  AT NIGHT PRN ANXIETY/insomnia 105 tablet 2   • diclofenac (VOLTAREN) 1 % gel gel Place 4 g on the skin as directed by provider 4 (Four) Times a Day.     • estradiol (MINIVELLE, VIVELLE-DOT) 0.05 MG/24HR patch APPLY ONE PATCH TO SKIN TWO TIMES A WEEK     • medroxyPROGESTERone (PROVERA) 2.5 MG tablet Take 2.5 mg by mouth Daily.     • omeprazole OTC (PriLOSEC OTC) 20 MG EC tablet Take 20 mg by mouth daily.     • phentermine (ADIPEX-P) 37.5 MG tablet Take 37.5 mg by mouth Every Morning.     • venlafaxine XR (Effexor XR) 37.5 MG 24 hr capsule Take 1 capsule by mouth Daily. 90 capsule 3   • vitamin B-12 (CYANOCOBALAMIN) 1000 MCG tablet Take 1 tablet by mouth Daily. 90 tablet 2   • celecoxib (CeleBREX) 200 MG capsule Take 1 capsule by mouth Daily. 14 capsule 0   • magnesium gluconate (MAGONATE) 500 MG tablet Take 27 mg by mouth Daily.       Current Facility-Administered Medications   Medication Dose Route Frequency Provider Last Rate Last Admin   • cyanocobalamin injection 1,000 mcg  1,000 mcg Intramuscular Q28 Days Gamal Gamez MD   1,000 mcg at 03/18/20 1555       Juana Lopez had no medications administered during this visit.    Return in about 3 months (around 1/5/2023).    There are no Patient Instructions on file for this visit.

## 2022-10-06 ENCOUNTER — TELEPHONE (OUTPATIENT)
Dept: FAMILY MEDICINE CLINIC | Facility: CLINIC | Age: 52
End: 2022-10-06

## 2022-10-06 NOTE — TELEPHONE ENCOUNTER
Pharmacy Name: [facility][id number] - [place], [place] - [address] . - [phone]  - [phone] FX     What medication are you calling in regards to: celecoxib (CeleBREX) 200 MG capsule    What question does the pharmacy have:NEEDS A PRIOR AUTH

## 2022-10-10 NOTE — TELEPHONE ENCOUNTER
Patient spoke with her insurance & they haven't received a PA request for the Celebrex, is it possible to get this started today

## 2022-10-11 ENCOUNTER — OFFICE VISIT (OUTPATIENT)
Dept: ORTHOPEDIC SURGERY | Facility: CLINIC | Age: 52
End: 2022-10-11

## 2022-10-11 VITALS — BODY MASS INDEX: 33.2 KG/M2 | TEMPERATURE: 97.1 F | HEIGHT: 64 IN | WEIGHT: 194.5 LBS

## 2022-10-11 DIAGNOSIS — R52 PAIN: Primary | ICD-10-CM

## 2022-10-11 DIAGNOSIS — M25.552 LEFT HIP PAIN: ICD-10-CM

## 2022-10-11 PROCEDURE — 73502 X-RAY EXAM HIP UNI 2-3 VIEWS: CPT | Performed by: NURSE PRACTITIONER

## 2022-10-11 PROCEDURE — 20610 DRAIN/INJ JOINT/BURSA W/O US: CPT | Performed by: NURSE PRACTITIONER

## 2022-10-11 PROCEDURE — 99203 OFFICE O/P NEW LOW 30 MIN: CPT | Performed by: NURSE PRACTITIONER

## 2022-10-11 RX ORDER — METHYLPREDNISOLONE ACETATE 80 MG/ML
80 INJECTION, SUSPENSION INTRA-ARTICULAR; INTRALESIONAL; INTRAMUSCULAR; SOFT TISSUE
Status: COMPLETED | OUTPATIENT
Start: 2022-10-11 | End: 2022-10-11

## 2022-10-11 RX ADMIN — METHYLPREDNISOLONE ACETATE 80 MG: 80 INJECTION, SUSPENSION INTRA-ARTICULAR; INTRALESIONAL; INTRAMUSCULAR; SOFT TISSUE at 14:02

## 2022-10-11 NOTE — PROGRESS NOTES
"Patient: Juana Lopez  YOB: 1970 52 y.o. female  Medical Record Number: [id number]    Chief Complaints:   Chief Complaint   Patient presents with   • Left Hip - Initial Evaluation       History of Present Illness:Juana Lopez is a 52 y.o. female who presents as a new patient both myself as well as to the practice with complaints of left lateral hip pain.  Patient reports it started 2 months ago, it is worse at night when she lies on her side, only slightly better with change in position.  She initially saw her medical care provider they have prescribed Celebrex but she is still waiting on a prior authorization.  He does have a history of labral tear of left hip which was surgically addressed years ago but reports\" this does feel different\" denies any groin pain    Allergies: No Known Allergies    Medications:   Current Outpatient Medications   Medication Sig Dispense Refill   • Bioflavonoid Products (VITAMIN C) chewable tablet Chew 240 mg 2 (Two) Times a Day.     • celecoxib (CeleBREX) 200 MG capsule Take 1 capsule by mouth Daily. 14 capsule 0   • Cholecalciferol (VITAMIN D3) 5000 UNITS capsule capsule Take 5,000 Units by mouth daily.     • clonazePAM (KlonoPIN) 0.5 MG tablet 1/2 in AM AND 3  AT NIGHT PRN ANXIETY/insomnia 105 tablet 2   • diclofenac (VOLTAREN) 1 % gel gel Place 4 g on the skin as directed by provider 4 (Four) Times a Day.     • estradiol (MINIVELLE, VIVELLE-DOT) 0.05 MG/24HR patch APPLY ONE PATCH TO SKIN TWO TIMES A WEEK     • medroxyPROGESTERone (PROVERA) 2.5 MG tablet Take 2.5 mg by mouth Daily.     • omeprazole OTC (PriLOSEC OTC) 20 MG EC tablet Take 20 mg by mouth daily.     • phentermine (ADIPEX-P) 37.5 MG tablet Take 37.5 mg by mouth Every Morning.     • venlafaxine XR (Effexor XR) 37.5 MG 24 hr capsule Take 1 capsule by mouth Daily. 90 capsule 3   • vitamin B-12 (CYANOCOBALAMIN) 1000 MCG tablet Take 1 tablet by mouth Daily. 90 tablet 2     Current Facility-Administered " "Medications   Medication Dose Route Frequency Provider Last Rate Last Admin   • cyanocobalamin injection 1,000 mcg  1,000 mcg Intramuscular Q28 Days Gamal Gamez MD   1,000 mcg at 03/18/20 1555         The following portions of the patient's history were reviewed and updated as appropriate: allergies, current medications, past family history, past medical history, past social history, past surgical history and problem list.    Review of Systems:   A 14 point review of systems was performed. All systems negative except pertinent positives/negative listed in HPI above    Physical Exam:   Vitals:    10/11/22 1344   Temp: 97.1 °F (36.2 °C)   TempSrc: Temporal   Weight: 88.2 kg (194 lb 8 oz)   Height: 162.6 cm (64.02\")       General: A and O x 3, ASA, NAD    SCLERA:    Normal    Skin clear no unusual lesions noted  Left hip the patient is very tender over left hip greater trochanteric bursa he otherwise has normal internal extra rotation with a negative Stinchfield negative logroll calf is soft and nontender       Radiology:  Xrays 2 views of left hip ordered and reviewed today secondary to pain and were normal.  No compared to views available    Assessment/Plan: Left hip greater trochanteric bursitis    The patient and I discussed options, she would like to proceed with left hip bursa injection, physical therapy, Celebrex once approved, and I will see her back as needed    Large Joint Arthrocentesis: L greater trochanteric bursa  Date/Time: 10/11/2022 2:02 PM  Consent given by: patient  Site marked: site marked  Timeout: Immediately prior to procedure a time out was called to verify the correct patient, procedure, equipment, support staff and site/side marked as required   Supporting Documentation  Indications: pain   Procedure Details  Location: hip - L greater trochanteric bursa  Needle size: 22 G  Approach: lateral  Medications administered: 80 mg methylPREDNISolone acetate 80 MG/ML; 2 mL lidocaine " (cardiac)              Abril Lee, APRN  10/11/2022

## 2022-10-11 NOTE — TELEPHONE ENCOUNTER
Patient called back to check the status of the PA.  She states she just got off the phone with her insurance and they are saying they haven't received anything.

## 2022-10-12 ENCOUNTER — PATIENT ROUNDING (BHMG ONLY) (OUTPATIENT)
Dept: ORTHOPEDIC SURGERY | Facility: CLINIC | Age: 52
End: 2022-10-12

## 2022-10-12 ENCOUNTER — TELEPHONE (OUTPATIENT)
Dept: FAMILY MEDICINE CLINIC | Facility: CLINIC | Age: 52
End: 2022-10-12

## 2022-10-12 DIAGNOSIS — M70.62 GREATER TROCHANTERIC BURSITIS OF LEFT HIP: ICD-10-CM

## 2022-10-12 RX ORDER — CELECOXIB 200 MG/1
200 CAPSULE ORAL DAILY
Qty: 14 CAPSULE | Refills: 0 | Status: SHIPPED | OUTPATIENT
Start: 2022-10-12 | End: 2023-02-13

## 2022-10-12 NOTE — TELEPHONE ENCOUNTER
Caller: Juana Lopez    Relationship: Self    Best call back number:    [phone]          What is the best time to reach you: ANYTIME    Who are you requesting to speak with (clinical staff, provider,  specific staff member): DALE    What was the call regarding: PATIENT CALLING TO FOLLOW UP ON THE PRIOR AUTH FOR CELEBREX SHE HAS NOT RECEIVED AN UPDATE YET.    Do you require a callback: YES

## 2022-10-17 DIAGNOSIS — F41.9 ANXIETY AND DEPRESSION: ICD-10-CM

## 2022-10-17 DIAGNOSIS — F32.A ANXIETY AND DEPRESSION: ICD-10-CM

## 2022-10-18 RX ORDER — CLONAZEPAM 0.5 MG/1
TABLET ORAL
Qty: 105 TABLET | Refills: 2 | Status: SHIPPED | OUTPATIENT
Start: 2022-10-18 | End: 2023-01-09 | Stop reason: SDUPTHER

## 2022-10-31 ENCOUNTER — TELEPHONE (OUTPATIENT)
Dept: FAMILY MEDICINE CLINIC | Facility: CLINIC | Age: 52
End: 2022-10-31

## 2022-10-31 NOTE — TELEPHONE ENCOUNTER
Caller: Juana Lopez    Relationship: Self    Best call back number: [phone]    What medication are you requesting: VALTREX OR GENERIC    What are your current symptoms: SHINGLES OUTBREAK    How long have you been experiencing symptoms: 10/29/22    Have you had these symptoms before:    [x] Yes  [] No    Have you been treated for these symptoms before:   [x] Yes  [] No    If a prescription is needed, what is your preferred pharmacy and phone number: [id number] [address]  [phone] [facility] [phone]      Additional notes: PATIENT STATES THAT SHE HAS HAD MULTIPLE OUTBREAKS IN THE PAST, AND HAD SOME MEDICATION LEFTOVER. SHE TOOK WHAT WAS LEFT AT THE START OF THIS RECENT OUTBREAK, BUT REQUESTS AN ADDITIONAL PRESCRIPTION FOR FURTHER TREATMENT. PLEASE CALL AND ADVISE

## 2022-11-01 DIAGNOSIS — B02.9 HERPES ZOSTER WITHOUT COMPLICATION: ICD-10-CM

## 2022-11-01 RX ORDER — VALACYCLOVIR HYDROCHLORIDE 1 G/1
1000 TABLET, FILM COATED ORAL 3 TIMES DAILY
Qty: 21 TABLET | Refills: 5 | Status: SHIPPED | OUTPATIENT
Start: 2022-11-01

## 2022-11-23 ENCOUNTER — TELEPHONE (OUTPATIENT)
Dept: FAMILY MEDICINE CLINIC | Facility: CLINIC | Age: 52
End: 2022-11-23

## 2022-11-23 NOTE — PROGRESS NOTES
A [employer] Message has been sent to the patient for PATIENT ROUNDING with [facility]  
(0) indicator not present

## 2022-11-23 NOTE — TELEPHONE ENCOUNTER
Patient called and wanted to know if you can prescribed some medications she has runny nose, cough, and congestion she went to urgent care she has the flu, but she is not getting any better, and she wanted to know if you can prescribed her some other medicine.

## 2023-01-09 DIAGNOSIS — F32.A ANXIETY AND DEPRESSION: ICD-10-CM

## 2023-01-09 DIAGNOSIS — F41.9 ANXIETY AND DEPRESSION: ICD-10-CM

## 2023-01-09 RX ORDER — CLONAZEPAM 0.5 MG/1
TABLET ORAL
Qty: 105 TABLET | Refills: 2 | Status: SHIPPED | OUTPATIENT
Start: 2023-01-09 | End: 2023-02-13 | Stop reason: SDUPTHER

## 2023-01-09 NOTE — TELEPHONE ENCOUNTER
Caller: Juana Lopez ZINA    Relationship: Self    Best call back number: [phone]    Requested Prescriptions:   Requested Prescriptions     Pending Prescriptions Disp Refills   • clonazePAM (KlonoPIN) 0.5 MG tablet 105 tablet 2     Si/2 in AM AND 3  AT NIGHT PRN ANXIETY/insomnia        Pharmacy where request should be sent: [facility]/PHARMACY #[id number] - [place], [place] - [address] . - [phone]  - [phone] FX     Additional details provided by patient: PATIENT STATES SHE IS REQUESTING A REFILL TO GET HER TO HER NEXT APPOINTMENT ON 2023 DUE TO HER HAVING TO RESCHEDULE TODAY'S APPOINTMENT SINCE SHE HAS BEEN SICK FOR 2 DAYS WITH A STOMACH VIRUS.     PATIENT STATES SHE ONLY HAS A COUPLE DAYS LEFT ON HER CURRENT PRESCRIPTION.     Does the patient have less than a 3 day supply:  [x] Yes  [] No    Would you like a call back once the refill request has been completed: [x] Yes [] No    If the office needs to give you a call back, can they leave a voicemail: [x] Yes [] No    Katherin Woods, PCT   23 08:25 EST

## 2023-02-13 ENCOUNTER — OFFICE VISIT (OUTPATIENT)
Dept: FAMILY MEDICINE CLINIC | Facility: CLINIC | Age: 53
End: 2023-02-13
Payer: COMMERCIAL

## 2023-02-13 VITALS
TEMPERATURE: 98.5 F | WEIGHT: 202 LBS | OXYGEN SATURATION: 97 % | HEART RATE: 79 BPM | HEIGHT: 64 IN | DIASTOLIC BLOOD PRESSURE: 76 MMHG | BODY MASS INDEX: 34.49 KG/M2 | SYSTOLIC BLOOD PRESSURE: 124 MMHG

## 2023-02-13 DIAGNOSIS — F32.1 CURRENT MODERATE EPISODE OF MAJOR DEPRESSIVE DISORDER WITHOUT PRIOR EPISODE: ICD-10-CM

## 2023-02-13 DIAGNOSIS — Z12.11 COLON CANCER SCREENING: ICD-10-CM

## 2023-02-13 DIAGNOSIS — F41.1 GENERALIZED ANXIETY DISORDER: Primary | ICD-10-CM

## 2023-02-13 DIAGNOSIS — F32.A ANXIETY AND DEPRESSION: ICD-10-CM

## 2023-02-13 DIAGNOSIS — F41.9 ANXIETY AND DEPRESSION: ICD-10-CM

## 2023-02-13 DIAGNOSIS — N95.1 HOT FLASHES DUE TO MENOPAUSE: ICD-10-CM

## 2023-02-13 PROCEDURE — 99214 OFFICE O/P EST MOD 30 MIN: CPT | Performed by: FAMILY MEDICINE

## 2023-02-13 RX ORDER — PHENAZOPYRIDINE HYDROCHLORIDE 200 MG/1
TABLET, FILM COATED ORAL
COMMUNITY
Start: 2022-12-20

## 2023-02-13 RX ORDER — CLONAZEPAM 0.5 MG/1
TABLET ORAL
Qty: 315 TABLET | Refills: 0 | Status: SHIPPED | OUTPATIENT
Start: 2023-02-13 | End: 2023-03-08 | Stop reason: SDUPTHER

## 2023-02-13 RX ORDER — PROGESTERONE 100 MG/1
100 CAPSULE ORAL NIGHTLY
COMMUNITY
Start: 2023-01-11

## 2023-02-13 RX ORDER — VENLAFAXINE HYDROCHLORIDE 37.5 MG/1
37.5 CAPSULE, EXTENDED RELEASE ORAL DAILY
Qty: 90 CAPSULE | Refills: 3 | Status: SHIPPED | OUTPATIENT
Start: 2023-02-13

## 2023-02-13 NOTE — PROGRESS NOTES
Chief Complaint   Patient presents with   • Depression       Subjective   Media ZINA Lopez is a 53 y.o. female.     History of Present Illness   F/u ARCENIO.   Doing welwl ith meds.  No Se.    F/U depression.  Doing welwl ith meds.    The following portions of the patient's history were reviewed and updated as appropriate: allergies, current medications, past family history, past medical history, past social history, past surgical history and problem list.    Review of Systems   Constitutional: Negative for appetite change and fatigue.   HENT: Negative for nosebleeds and sore throat.    Eyes: Negative for blurred vision and visual disturbance.   Respiratory: Negative for shortness of breath and wheezing.    Cardiovascular: Negative for chest pain and leg swelling.   Gastrointestinal: Negative for abdominal distention and abdominal pain.   Endocrine: Negative for cold intolerance and polyuria.   Genitourinary: Negative for dysuria and hematuria.   Musculoskeletal: Negative for arthralgias and myalgias.   Skin: Negative for color change and rash.   Neurological: Negative for weakness and confusion.   Psychiatric/Behavioral: Negative for agitation and depressed mood.       Patient Active Problem List   Diagnosis   • Back pain   • Anxiety and depression   • Goiter   • Fatigue   • Fibromyalgia   • Herpes zoster   • Insomnia   • Iron deficiency   • Myalgia   • Vitamin B12 deficiency   • Vitamin D deficiency   • History of MRSA infection   • Large bowel obstruction (HCC)   • Seasonal allergies   • GERD without esophagitis   • Hypertriglyceridemia   • Generalized anxiety disorder   • Malabsorption   • Gastroenteritis   • High risk medication use   • Upper respiratory tract infection due to COVID-19 virus   • Encounter for annual health examination   • Encounter for hepatitis C screening test for low risk patient   • Perforation of right tympanic membrane   • Upper respiratory infection, acute   • Current episode of major  depressive disorder without prior episode   • Hot flashes due to menopause   • Greater trochanteric bursitis of left hip   • Immunization deficiency       No Known Allergies      Current Outpatient Medications:   •  Bioflavonoid Products (VITAMIN C) chewable tablet, Chew 240 mg 2 (Two) Times a Day., Disp: , Rfl:   •  Cholecalciferol (VITAMIN D3) 5000 UNITS capsule capsule, Take 5,000 Units by mouth daily., Disp: , Rfl:   •  clonazePAM (KlonoPIN) 0.5 MG tablet, 1/2 in AM AND 3  AT NIGHT PRN ANXIETY/insomnia, Disp: 315 tablet, Rfl: 0  •  diclofenac (VOLTAREN) 1 % gel gel, Place 4 g on the skin as directed by provider 4 (Four) Times a Day., Disp: , Rfl:   •  estradiol (MINIVELLE, VIVELLE-DOT) 0.05 MG/24HR patch, APPLY ONE PATCH TO SKIN TWO TIMES A WEEK, Disp: , Rfl:   •  medroxyPROGESTERone (PROVERA) 2.5 MG tablet, Take 2.5 mg by mouth Daily., Disp: , Rfl:   •  omeprazole OTC (PriLOSEC OTC) 20 MG EC tablet, Take 20 mg by mouth daily., Disp: , Rfl:   •  phentermine (ADIPEX-P) 37.5 MG tablet, Take 37.5 mg by mouth Every Morning., Disp: , Rfl:   •  Progesterone (PROMETRIUM) 100 MG capsule, Take 100 mg by mouth Every Night., Disp: , Rfl:   •  valACYclovir (Valtrex) 1000 MG tablet, Take 1 tablet by mouth 3 (Three) Times a Day. Take for 7 days if outbreak, Disp: 21 tablet, Rfl: 5  •  venlafaxine XR (Effexor XR) 37.5 MG 24 hr capsule, Take 1 capsule by mouth Daily., Disp: 90 capsule, Rfl: 3  •  vitamin B-12 (CYANOCOBALAMIN) 1000 MCG tablet, Take 1 tablet by mouth Daily., Disp: 90 tablet, Rfl: 2  •  phenazopyridine (PYRIDIUM) 200 MG tablet, TAKE 1 TABLET THREE TIMES A DAY AFTER MEALS, Disp: , Rfl:   No current facility-administered medications for this visit.    Past Medical History:   Diagnosis Date   • Acute upper respiratory infection    • Anemia    • Anxiety    • Arthritis    • Depression    • External hemorrhoid, thrombosed    • Fibromyalgia    • Folliculitis    • ARCENIO (generalized anxiety disorder)    • Gastroparesis    •  GERD (gastroesophageal reflux disease)    • GERD without esophagitis    • Heartburn    • Hemorrhoids    • High risk medication use    • History of staph infection     LAST TIME 4 YEARS AGO INSIDE RIGHT NARIS   • Hypertriglyceridemia    • Immunization deficiency    • Infected prosthetic mesh of abdominal wall (HCC)    • Insomnia    • MRSA carrier    • Persistent insomnia    • Pica    • PONV (postoperative nausea and vomiting)    • Shingles    • Strain of rectus abdominis muscle    • Vitamin B12 deficiency    • Volvulus of intestine (HCC)        Past Surgical History:   Procedure Laterality Date   • ABDOMINOPLASTY     • BACK SURGERY  09/2015    [facility]   • BARIATRIC SURGERY     • BILATERAL BREAST REDUCTION Bilateral 10/2021   • CHOLECYSTECTOMY     • COLECTOMY PARTIAL / TOTAL Right     Right Hemicolectomy   • COLONOSCOPY N/A 07/12/2016    Procedure: COLONOSCOPY into small bowel;  Surgeon: Riley Narayan MD;  Location: [facility] ENDOSCOPY;  Service:    • EXPLORATORY LAPAROTOMY N/A 04/20/2016    Procedure: LAPAROTOMY EXPLORATORY, POSSIBLE BOWEL RESECTION, POSSIBLE OSTOMY;  Surgeon: Riley Narayan MD;  Location: [facility] MAIN OR;  Service:    • EXPLORATORY LAPAROTOMY N/A 12/02/2016    Procedure: exploratory laparotomy, removal of  infected abdominal wall mesh. ;  Surgeon: Riley Narayan MD;  Location: [facility] MAIN OR;  Service:    • GASTRIC BYPASS  2000   • HIP ARTHROSCOPY W/ LABRAL REPAIR Left    • LAPAROSCOPIC TUBAL LIGATION     • OTHER SURGICAL HISTORY      LASER ON BACK   • UMBILICAL HERNIA REPAIR         Family History   Problem Relation Age of Onset   • Heart attack Father    • Diabetes Father    • Stroke Father    • Pancreatitis Father    • Diabetes Sister    • Breast cancer Maternal Grandmother    • Diabetes Paternal Grandmother    • Osteoporosis Paternal Grandmother    • Diabetes Paternal Grandfather    • Arthritis Mother    • No Known Problems  Other        Social History     Tobacco Use   • Smoking status: Former   • Smokeless tobacco: Never   • Tobacco comments:     smoked less than 1 pack per day for 10 year   Substance Use Topics   • Alcohol use: Yes     Comment: once every couple of months-7 or less drinks per week            Objective     Vitals:    02/13/23 1542   BP: 124/76   Pulse: 79   Temp: 98.5 °F (36.9 °C)   SpO2: 97%     Body mass index is 34.66 kg/m².    Physical Exam  Vitals reviewed.   Constitutional:       Appearance: She is well-developed. She is not diaphoretic.   HENT:      Head: Normocephalic and atraumatic.   Eyes:      General: No scleral icterus.     Pupils: Pupils are equal, round, and reactive to light.   Neck:      Thyroid: No thyromegaly.   Cardiovascular:      Rate and Rhythm: Normal rate and regular rhythm.      Heart sounds: No murmur heard.    No friction rub. No gallop.   Pulmonary:      Effort: Pulmonary effort is normal. No respiratory distress.      Breath sounds: No wheezing or rales.   Chest:      Chest wall: No tenderness.   Abdominal:      General: Bowel sounds are normal. There is no distension.      Palpations: Abdomen is soft.      Tenderness: There is no abdominal tenderness.   Musculoskeletal:         General: No deformity. Normal range of motion.   Lymphadenopathy:      Cervical: No cervical adenopathy.   Skin:     General: Skin is warm and dry.      Findings: No rash.   Neurological:      Cranial Nerves: No cranial nerve deficit.      Motor: No abnormal muscle tone.         Lab Results   Component Value Date    GLUCOSE 110 (H) 05/02/2022    BUN 11 05/02/2022    CREATININE 0.67 05/02/2022    EGFRIFNONA 74 08/13/2020    EGFRIFAFRI 90 08/13/2020    BCR 16 05/02/2022    K 4.6 05/02/2022    CO2 21 05/02/2022    CALCIUM 9.0 05/02/2022    PROTENTOTREF 6.8 05/02/2022    ALBUMIN 4.4 05/02/2022    LABIL2 1.8 05/02/2022    AST 13 05/02/2022    ALT 15 05/02/2022       WBC   Date Value Ref Range Status   05/02/2022 6.7  3.4 - 10.8 x10E3/uL Final     RBC   Date Value Ref Range Status   05/02/2022 4.22 3.77 - 5.28 x10E6/uL Final     Hemoglobin   Date Value Ref Range Status   05/02/2022 12.5 11.1 - 15.9 g/dL Final   12/03/2016 9.1 (L) 11.9 - 15.5 g/dL Final     Hematocrit   Date Value Ref Range Status   05/02/2022 39.2 34.0 - 46.6 % Final   12/03/2016 30.2 (L) 35.6 - 45.5 % Final     MCV   Date Value Ref Range Status   05/02/2022 93 79 - 97 fL Final   12/03/2016 88.0 80.5 - 98.2 fL Final     MCH   Date Value Ref Range Status   05/02/2022 29.6 26.6 - 33.0 pg Final   12/03/2016 26.5 (L) 26.9 - 32.0 pg Final     MCHC   Date Value Ref Range Status   05/02/2022 31.9 31.5 - 35.7 g/dL Final   12/03/2016 30.1 (L) 32.4 - 36.3 g/dL Final     RDW   Date Value Ref Range Status   05/02/2022 12.6 11.7 - 15.4 % Final   12/03/2016 14.4 (H) 11.7 - 13.0 % Final     RDW-SD   Date Value Ref Range Status   12/03/2016 46.7 37.0 - 54.0 fl Final     MPV   Date Value Ref Range Status   12/03/2016 10.2 6.0 - 12.0 fL Final     Platelets   Date Value Ref Range Status   05/02/2022 267 150 - 450 x10E3/uL Final   12/03/2016 277 140 - 500 10*3/mm3 Final     Neutrophil Rel %   Date Value Ref Range Status   05/02/2022 63 Not Estab. % Final     Neutrophil %   Date Value Ref Range Status   12/03/2016 83.8 (H) 42.7 - 76.0 % Final     Lymphocyte Rel %   Date Value Ref Range Status   05/02/2022 25 Not Estab. % Final     Lymphocyte %   Date Value Ref Range Status   12/03/2016 7.9 (L) 19.6 - 45.3 % Final     Monocyte Rel %   Date Value Ref Range Status   05/02/2022 6 Not Estab. % Final     Monocyte %   Date Value Ref Range Status   12/03/2016 5.7 5.0 - 12.0 % Final     Eosinophil Rel %   Date Value Ref Range Status   05/02/2022 5 Not Estab. % Final     Eosinophil %   Date Value Ref Range Status   12/03/2016 2.3 0.3 - 6.2 % Final     Basophil Rel %   Date Value Ref Range Status   05/02/2022 1 Not Estab. % Final     Basophil %   Date Value Ref Range Status   12/03/2016 0.1 0.0  - 1.5 % Final     Immature Grans %   Date Value Ref Range Status   12/03/2016 0.2 0.0 - 0.5 % Final     Neutrophils Absolute   Date Value Ref Range Status   05/02/2022 4.3 1.4 - 7.0 x10E3/uL Final     Neutrophils, Absolute   Date Value Ref Range Status   12/03/2016 10.19 (H) 1.90 - 8.10 10*3/mm3 Final     Lymphocytes Absolute   Date Value Ref Range Status   05/02/2022 1.7 0.7 - 3.1 x10E3/uL Final     Lymphocytes, Absolute   Date Value Ref Range Status   12/03/2016 0.96 0.90 - 4.80 10*3/mm3 Final     Monocytes Absolute   Date Value Ref Range Status   05/02/2022 0.4 0.1 - 0.9 x10E3/uL Final     Monocytes, Absolute   Date Value Ref Range Status   12/03/2016 0.69 0.20 - 1.20 10*3/mm3 Final     Eosinophils Absolute   Date Value Ref Range Status   05/02/2022 0.3 0.0 - 0.4 x10E3/uL Final     Eosinophils, Absolute   Date Value Ref Range Status   12/03/2016 0.28 0.00 - 0.70 10*3/mm3 Final     Basophils Absolute   Date Value Ref Range Status   05/02/2022 0.1 0.0 - 0.2 x10E3/uL Final     Basophils, Absolute   Date Value Ref Range Status   12/03/2016 0.01 0.00 - 0.20 10*3/mm3 Final     Immature Grans, Absolute   Date Value Ref Range Status   12/03/2016 0.03 0.00 - 0.03 10*3/mm3 Final     nRBC   Date Value Ref Range Status   08/13/2020 0.0 0.0 - 0.2 /100 WBC Final       No results found for: HGBA1C    Lab Results   Component Value Date    DEUUTITX39 626 05/02/2022       TSH   Date Value Ref Range Status   08/13/2020 1.660 0.270 - 4.200 uIU/mL Final       No results found for: CHOL  Lab Results   Component Value Date    TRIG 157 (H) 05/02/2022     Lab Results   Component Value Date    HDL 58 05/02/2022     Lab Results   Component Value Date    LDL 98 05/02/2022     Lab Results   Component Value Date    VLDL 27 05/02/2022     No results found for: LDLHDL      Procedures    Assessment & Plan   Problems Addressed this Visit     Anxiety and depression    Relevant Medications    venlafaxine XR (Effexor XR) 37.5 MG 24 hr capsule     clonazePAM (KlonoPIN) 0.5 MG tablet    Current episode of major depressive disorder without prior episode    Relevant Medications    venlafaxine XR (Effexor XR) 37.5 MG 24 hr capsule    Generalized anxiety disorder - Primary    Relevant Medications    venlafaxine XR (Effexor XR) 37.5 MG 24 hr capsule    Hot flashes due to menopause    Relevant Medications    venlafaxine XR (Effexor XR) 37.5 MG 24 hr capsule   Other Visit Diagnoses     Colon cancer screening        Relevant Orders    Ambulatory Referral to General Surgery      Diagnoses       Codes Comments    Generalized anxiety disorder    -  Primary ICD-10-CM: F41.1  ICD-9-CM: 300.02     Current moderate episode of major depressive disorder without prior episode (HCC)     ICD-10-CM: F32.1  ICD-9-CM: 296.22     Hot flashes due to menopause     ICD-10-CM: N95.1  ICD-9-CM: 627.2     Anxiety and depression     ICD-10-CM: F41.9, F32.A  ICD-9-CM: 300.00, 311     Colon cancer screening     ICD-10-CM: Z12.11  ICD-9-CM: V76.51       ARCENIO.  Controlled.  Continue clonazepam.  RX done.     Depression.  Controlled.  Continue effexor.  Rx sent.      Orders Placed This Encounter   Procedures   • Ambulatory Referral to General Surgery     Referral Priority:   Routine     Referral Type:   Consultation     Referral Reason:   Specialty Services Required     Referred to Provider:   Riley Narayan MD     Requested Specialty:   General Surgery     Number of Visits Requested:   1       Current Outpatient Medications   Medication Sig Dispense Refill   • Bioflavonoid Products (VITAMIN C) chewable tablet Chew 240 mg 2 (Two) Times a Day.     • Cholecalciferol (VITAMIN D3) 5000 UNITS capsule capsule Take 5,000 Units by mouth daily.     • clonazePAM (KlonoPIN) 0.5 MG tablet 1/2 in AM AND 3  AT NIGHT PRN ANXIETY/insomnia 315 tablet 0   • diclofenac (VOLTAREN) 1 % gel gel Place 4 g on the skin as directed by provider 4 (Four) Times a Day.     • estradiol (MINIVELLE, VIVELLE-DOT) 0.05  MG/24HR patch APPLY ONE PATCH TO SKIN TWO TIMES A WEEK     • medroxyPROGESTERone (PROVERA) 2.5 MG tablet Take 2.5 mg by mouth Daily.     • omeprazole OTC (PriLOSEC OTC) 20 MG EC tablet Take 20 mg by mouth daily.     • phentermine (ADIPEX-P) 37.5 MG tablet Take 37.5 mg by mouth Every Morning.     • Progesterone (PROMETRIUM) 100 MG capsule Take 100 mg by mouth Every Night.     • valACYclovir (Valtrex) 1000 MG tablet Take 1 tablet by mouth 3 (Three) Times a Day. Take for 7 days if outbreak 21 tablet 5   • venlafaxine XR (Effexor XR) 37.5 MG 24 hr capsule Take 1 capsule by mouth Daily. 90 capsule 3   • vitamin B-12 (CYANOCOBALAMIN) 1000 MCG tablet Take 1 tablet by mouth Daily. 90 tablet 2   • phenazopyridine (PYRIDIUM) 200 MG tablet TAKE 1 TABLET THREE TIMES A DAY AFTER MEALS       No current facility-administered medications for this visit.       Juana Lopez had no medications administered during this visit.    Return in about 4 months (around 6/13/2023).    There are no Patient Instructions on file for this visit.

## 2023-03-08 DIAGNOSIS — F41.9 ANXIETY AND DEPRESSION: ICD-10-CM

## 2023-03-08 DIAGNOSIS — F32.A ANXIETY AND DEPRESSION: ICD-10-CM

## 2023-03-08 RX ORDER — CLONAZEPAM 0.5 MG/1
TABLET ORAL
Qty: 20 TABLET | Refills: 0 | Status: SHIPPED | OUTPATIENT
Start: 2023-03-08

## 2023-03-08 NOTE — TELEPHONE ENCOUNTER
Caller: [facility] - [place], [place] - [address] - [phone] [facility] [phone] FX    Relationship: Pharmacy - [facility]     Best call back number: [phone]    Requested Prescriptions:   Requested Prescriptions     Pending Prescriptions Disp Refills   • clonazePAM (KlonoPIN) 0.5 MG tablet 315 tablet 0     Si/2 in AM AND 3  AT NIGHT PRN ANXIETY/insomnia        Pharmacy where request should be sent: [facility] [address] [place] [address] [address] [place] [address] [phone] [facility] [phone] FX     Additional details provided by patient: PHARMACY CALLING STATING THAT THEY HAVE BEEN SHORT STOCKED ON THIS MEDICATION. PATIENTS MAIL ORDER WILL BE SENT OUT TODAY,  SHE HAS ENOUGH FOR TODAY. SHE WOULD LIKE TO GET A SHORT SUPPLY SENT TO [facility]     Does the patient have less than a 3 day supply:  [x] Yes  [] No    Would you like a call back once the refill request has been completed: [] Yes [x] No    If the office needs to give you a call back, can they leave a voicemail: [] Yes [x] No    Fito Weaver Rep   23 09:50 EST

## 2023-03-10 ENCOUNTER — PREP FOR SURGERY (OUTPATIENT)
Dept: OTHER | Facility: HOSPITAL | Age: 53
End: 2023-03-10
Payer: COMMERCIAL

## 2023-03-10 DIAGNOSIS — Z12.11 SCREENING FOR MALIGNANT NEOPLASM OF COLON: Primary | ICD-10-CM

## 2023-04-11 ENCOUNTER — HOSPITAL ENCOUNTER (OUTPATIENT)
Facility: HOSPITAL | Age: 53
Setting detail: HOSPITAL OUTPATIENT SURGERY
Discharge: HOME | End: 2023-04-11
Attending: SURGERY | Admitting: SURGERY
Payer: COMMERCIAL

## 2023-04-11 ENCOUNTER — ANESTHESIA EVENT (OUTPATIENT)
Dept: GASTROENTEROLOGY | Facility: HOSPITAL | Age: 53
End: 2023-04-11
Payer: COMMERCIAL

## 2023-04-11 ENCOUNTER — ANESTHESIA (OUTPATIENT)
Dept: GASTROENTEROLOGY | Facility: HOSPITAL | Age: 53
End: 2023-04-11
Payer: COMMERCIAL

## 2023-04-11 VITALS
DIASTOLIC BLOOD PRESSURE: 66 MMHG | HEART RATE: 72 BPM | OXYGEN SATURATION: 96 % | TEMPERATURE: 98.1 F | SYSTOLIC BLOOD PRESSURE: 114 MMHG | WEIGHT: 195 LBS | BODY MASS INDEX: 33.29 KG/M2 | RESPIRATION RATE: 16 BRPM | HEIGHT: 64 IN

## 2023-04-11 PROCEDURE — 0 LIDOCAINE 1 % SOLUTION: Performed by: NURSE ANESTHETIST, CERTIFIED REGISTERED

## 2023-04-11 PROCEDURE — 25010000002 PROPOFOL 10 MG/ML EMULSION: Performed by: NURSE ANESTHETIST, CERTIFIED REGISTERED

## 2023-04-11 PROCEDURE — S0260 H&P FOR SURGERY: HCPCS | Performed by: SURGERY

## 2023-04-11 RX ORDER — PROPOFOL 10 MG/ML
VIAL (ML) INTRAVENOUS AS NEEDED
Status: DISCONTINUED | OUTPATIENT
Start: 2023-04-11 | End: 2023-04-11 | Stop reason: SURG

## 2023-04-11 RX ORDER — SODIUM CHLORIDE 9 MG/ML
40 INJECTION, SOLUTION INTRAVENOUS AS NEEDED
Status: DISCONTINUED | OUTPATIENT
Start: 2023-04-11 | End: 2023-04-11 | Stop reason: HOSPADM

## 2023-04-11 RX ORDER — SODIUM CHLORIDE 0.9 % (FLUSH) 0.9 %
10 SYRINGE (ML) INJECTION AS NEEDED
Status: DISCONTINUED | OUTPATIENT
Start: 2023-04-11 | End: 2023-04-11 | Stop reason: HOSPADM

## 2023-04-11 RX ORDER — SODIUM CHLORIDE 0.9 % (FLUSH) 0.9 %
10 SYRINGE (ML) INJECTION EVERY 12 HOURS SCHEDULED
Status: DISCONTINUED | OUTPATIENT
Start: 2023-04-11 | End: 2023-04-11 | Stop reason: HOSPADM

## 2023-04-11 RX ORDER — SODIUM CHLORIDE, SODIUM LACTATE, POTASSIUM CHLORIDE, CALCIUM CHLORIDE 600; 310; 30; 20 MG/100ML; MG/100ML; MG/100ML; MG/100ML
30 INJECTION, SOLUTION INTRAVENOUS CONTINUOUS PRN
Status: DISCONTINUED | OUTPATIENT
Start: 2023-04-11 | End: 2023-04-11 | Stop reason: HOSPADM

## 2023-04-11 RX ORDER — LIDOCAINE HYDROCHLORIDE 10 MG/ML
INJECTION, SOLUTION INFILTRATION; PERINEURAL AS NEEDED
Status: DISCONTINUED | OUTPATIENT
Start: 2023-04-11 | End: 2023-04-11 | Stop reason: SURG

## 2023-04-11 RX ADMIN — LIDOCAINE HYDROCHLORIDE 30 MG: 10 INJECTION, SOLUTION INFILTRATION; PERINEURAL at 09:41

## 2023-04-11 RX ADMIN — SODIUM CHLORIDE, POTASSIUM CHLORIDE, SODIUM LACTATE AND CALCIUM CHLORIDE: 600; 310; 30; 20 INJECTION, SOLUTION INTRAVENOUS at 09:38

## 2023-04-11 RX ADMIN — PROPOFOL 200 MCG/KG/MIN: 10 INJECTION, EMULSION INTRAVENOUS at 09:41

## 2023-04-11 RX ADMIN — PROPOFOL 90 MG: 10 INJECTION, EMULSION INTRAVENOUS at 09:41

## 2023-04-11 NOTE — H&P
Subjective:   Reason for Visit: Screening colonoscopy    HPI:  Patient presents for evaluation for colon cancer screening.  There is no family history of colon neoplasia. No family hx of gastric, ovarian, or uterine cancer.  Patient denies changes in bowel habits, diarrhea, constipation, abdominal pain, decreased caliber of stool, melena, brbpr and weight loss.  There is no personal or family history of bleeding disorder or untoward reaction to anesthesia.  Patient has had a colonoscopy 8 year(s) ago.      Past Medical History:   Diagnosis Date   • Acute upper respiratory infection    • Anemia    • Anxiety    • Arthritis    • Depression    • External hemorrhoid, thrombosed    • Fibromyalgia    • Folliculitis    • ARCENIO (generalized anxiety disorder)    • Gastroparesis    • GERD (gastroesophageal reflux disease)    • GERD without esophagitis    • Heartburn    • Hemorrhoids    • High risk medication use    • History of staph infection     LAST TIME 4 YEARS AGO INSIDE RIGHT NARIS   • Hypertriglyceridemia    • Immunization deficiency    • Infected prosthetic mesh of abdominal wall    • Insomnia    • MRSA carrier    • Persistent insomnia    • Pica    • PONV (postoperative nausea and vomiting)    • Shingles    • Strain of rectus abdominis muscle    • Vitamin B12 deficiency    • Volvulus of intestine        Past Surgical History:   Procedure Laterality Date   • ABDOMINOPLASTY     • BACK SURGERY  09/2015    Lower Back Surgery-by [facility]   • BARIATRIC SURGERY     • BILATERAL BREAST REDUCTION Bilateral 10/2021   • CHOLECYSTECTOMY     • COLECTOMY PARTIAL / TOTAL Right     Right Hemicolectomy   • COLONOSCOPY N/A 07/12/2016    Procedure: COLONOSCOPY into small bowel;  Surgeon: Riley Narayan MD;  Location: [facility];  Service:    • EXPLORATORY LAPAROTOMY N/A 04/20/2016    Procedure: LAPAROTOMY EXPLORATORY, POSSIBLE BOWEL RESECTION, POSSIBLE OSTOMY;  Surgeon: Riley Narayan MD;   Location: [facility] MAIN OR;  Service:    • EXPLORATORY LAPAROTOMY N/A 12/02/2016    Procedure: exploratory laparotomy, removal of  infected abdominal wall mesh. ;  Surgeon: Riley Narayan MD;  Location: [facility] MAIN OR;  Service:    • GASTRIC BYPASS  2000   • HIP ARTHROSCOPY W/ LABRAL REPAIR Left    • LAPAROSCOPIC TUBAL LIGATION     • OTHER SURGICAL HISTORY      LASER ON BACK   • UMBILICAL HERNIA REPAIR           Current Facility-Administered Medications:   •  lactated ringers infusion, 30 mL/hr, Intravenous, Continuous PRN, Riley Narayan MD  •  sodium chloride 0.9 % flush 10 mL, 10 mL, Intravenous, Q12H, Riley Narayan MD  •  sodium chloride 0.9 % flush 10 mL, 10 mL, Intravenous, PRN, Riley Narayan MD  •  sodium chloride 0.9 % infusion 40 mL, 40 mL, Intravenous, PRN, Riley Narayan MD    Facility-Administered Medications Ordered in Other Encounters:   •  lidocaine (XYLOCAINE) 1 % injection, , Intravenous, PRN, Day, Neha S CRNA, 30 mg at 04/11/23 0941  •  propofol (DIPRIVAN) infusion 10 mg/mL 100 mL, , Intravenous, Continuous PRN, Day, Neha S, CRNA, Last Rate: 106.2 mL/hr at 04/11/23 0941, 200 mcg/kg/min at 04/11/23 0941  •  Propofol (DIPRIVAN) injection, , Intravenous, PRN, Day, Neha S, CRNA, 90 mg at 04/11/23 0941    No Known Allergies    Family History   Problem Relation Age of Onset   • Heart attack Father    • Diabetes Father    • Stroke Father    • Pancreatitis Father    • Diabetes Sister    • Breast cancer Maternal Grandmother    • Diabetes Paternal Grandmother    • Osteoporosis Paternal Grandmother    • Diabetes Paternal Grandfather    • Arthritis Mother    • No Known Problems Other        Social History     Socioeconomic History   • Marital status: Single   • Number of children: 2   Tobacco Use   • Smoking status: Former   • Smokeless tobacco: Never   • Tobacco comments:     smoked less than 1 pack per day for 10 year   Substance and Sexual  "Activity   • Alcohol use: Yes     Comment: once every couple of months-7 or less drinks per week   • Drug use: No   • Sexual activity: Defer        Review Of Systems:  A comprehensive review of systems was negative.    Objective:   Physical exam:  /77 (BP Location: Left arm, Patient Position: Lying)   Pulse 75   Temp 98.1 °F (36.7 °C) (Oral)   Resp 16   Ht 162.6 cm (64\")   Wt 88.5 kg (195 lb)   SpO2 95%   BMI 33.47 kg/m²     General Appearance:  awake, alert, oriented, in no acute distress  Lungs:  Breathing Pattern: regular, no distress  Heart:  rrr  Abdomen:  Soft, non-tender, normal bowel sounds; no bruits, organomegaly or masses. Well healed incision    Assessment:    Juana Lopez is a 53 y.o. female who presents for evaluation for colon cancer screening.    Patient has no increased risk factors or warning signs or symptoms.  I reviewed current ACG guidelines for colon cancer screening:    A)  Flex sig q 5yrs with yearly fecal occult blood testing  B)  Virtual colonoscopy  C)  Colonoscopy with possible biopsy/polypectomy with IV sedation at appropriate       screening intervals    The patient has no family history of colon cancer.  She  has no personal history of colon polyp who presents for colon cancer screening evaluation.   I would advise a colonscopy.     The rationale for each option as well as all risks and benefits of each alternative were discussed with the patient in detail.  The patient understands and does wish to proceed with Colonoscopy Screening        The rationale for colonoscopy with possible biopsy, possible polypectomy, with IV sedation as well as all risks, benefits, and alternatives were discussed with the patient in detail. Risks including but not limited to perforation, bleeding,need for blood transfusion or emergent surgery ,and missed neoplasm were reviewed in detail with the patient The patient demonstrates full understanding and wishes to proceed with the colonoscopy.  "

## 2023-04-11 NOTE — ANESTHESIA POSTPROCEDURE EVALUATION
"Patient: Juana Lopez    Procedure Summary     Date: 04/11/23 Room / Location:  [facility] /  [facility]    Anesthesia Start: 0938 Anesthesia Stop: 1004    Procedure: COLONOSCOPY to anastamosis Diagnosis:       Screening for malignant neoplasm of colon      (Screening for malignant neoplasm of colon [Z12.11])    Surgeons: Riley Narayan MD Provider: Nathan Leonard MD    Anesthesia Type: MAC ASA Status: 2          Anesthesia Type: MAC    Vitals  Vitals Value Taken Time   /70 04/11/23 1004   Temp     Pulse 97 04/11/23 1004   Resp 16 04/11/23 1004   SpO2 94 % 04/11/23 1004           Post Anesthesia Care and Evaluation    Patient location during evaluation: bedside  Patient participation: complete - patient participated  Level of consciousness: sleepy but conscious  Pain score: 0  Pain management: adequate    Airway patency: patent  Anesthetic complications: No anesthetic complications    Cardiovascular status: acceptable  Respiratory status: acceptable  Hydration status: acceptable    Comments: /70 (BP Location: Left arm, Patient Position: Lying)   Pulse 97   Temp 36.7 °C (98.1 °F) (Oral)   Resp 16   Ht 162.6 cm (64\")   Wt 88.5 kg (195 lb)   SpO2 94%   BMI 33.47 kg/m²         "

## 2023-04-11 NOTE — ANESTHESIA PREPROCEDURE EVALUATION
Anesthesia Evaluation     Patient summary reviewed and Nursing notes reviewed   history of anesthetic complications: PONV               Airway   Mallampati: II  Neck ROM: full  No difficulty expected  Dental      Comment: Upper front teeth capped    Pulmonary     breath sounds clear to auscultation  (+) a smoker Former,   Cardiovascular     Rhythm: regular    (+) hyperlipidemia,       Neuro/Psych  (+) psychiatric history Anxiety and Depression,    GI/Hepatic/Renal/Endo    (+) obesity,  GERD,  thyroid problem     Musculoskeletal     (+) back pain,   Abdominal   (+) obese,    Substance History      OB/GYN          Other   arthritis,                      Anesthesia Plan    ASA 2     MAC     intravenous induction     Anesthetic plan, risks, benefits, and alternatives have been provided, discussed and informed consent has been obtained with: patient.        CODE STATUS:

## 2023-04-11 NOTE — DISCHARGE INSTRUCTIONS
For the next 24 hours patient needs to be with a responsible adult.    For 24 hours DO NOT drive, operate machinery, appliances, drink alcohol, make important decisions or sign legal documents.    Start with a light or bland diet if you are feeling sick to your stomach otherwise advance to regular diet as tolerated.    Follow recommendations on procedure report if provided by your doctor.    Call Dr Narayan for problems [phone]    Problems may include but not limited to: large amounts of bleeding, trouble breathing, repeated vomiting, severe unrelieved pain, fever or chills.

## 2023-04-26 ENCOUNTER — TELEMEDICINE (OUTPATIENT)
Dept: FAMILY MEDICINE CLINIC | Facility: CLINIC | Age: 53
End: 2023-04-26
Payer: COMMERCIAL

## 2023-04-26 DIAGNOSIS — J06.9 UPPER RESPIRATORY TRACT INFECTION DUE TO COVID-19 VIRUS: Primary | ICD-10-CM

## 2023-04-26 DIAGNOSIS — U07.1 UPPER RESPIRATORY TRACT INFECTION DUE TO COVID-19 VIRUS: Primary | ICD-10-CM

## 2023-04-26 NOTE — PROGRESS NOTES
Subjective   Media ZINA Lopez is a 53 y.o. female.     History of Present Illness   Video visit due to patient unable to come to the office due to active COVID infection.  Patient is at home.  Provider is at the office.  9-minute visit.    C/o symptoms of fever and cough and congestion.  Temp to 101.8.  O2 at 94%.   Mom diagnosed with COVID 3 days ago.  The following portions of the patient's history were reviewed and updated as appropriate: allergies, current medications, past family history, past medical history, past social history, past surgical history and problem list.    Review of Systems   Constitutional: Positive for fatigue and fever.   HENT: Positive for rhinorrhea.    Respiratory: Positive for cough. Negative for shortness of breath.    Cardiovascular: Negative for chest pain and leg swelling.       Patient Active Problem List   Diagnosis   • Back pain   • Anxiety and depression   • Goiter   • Fatigue   • Fibromyalgia   • Herpes zoster   • Insomnia   • Iron deficiency   • Myalgia   • Vitamin B12 deficiency   • Vitamin D deficiency   • History of MRSA infection   • Large bowel obstruction   • Seasonal allergies   • GERD without esophagitis   • Hypertriglyceridemia   • Generalized anxiety disorder   • Malabsorption   • Gastroenteritis   • High risk medication use   • Upper respiratory tract infection due to COVID-19 virus   • Encounter for annual health examination   • Encounter for hepatitis C screening test for low risk patient   • Perforation of right tympanic membrane   • Upper respiratory infection, acute   • Current episode of major depressive disorder without prior episode   • Hot flashes due to menopause   • Greater trochanteric bursitis of left hip   • Immunization deficiency       No Known Allergies      Current Outpatient Medications:   •  Bioflavonoid Products (VITAMIN C) chewable tablet, Chew 240 mg 2 (Two) Times a Day., Disp: , Rfl:   •  Cholecalciferol (VITAMIN D3) 5000 UNITS capsule  capsule, Take 1 capsule by mouth Daily., Disp: , Rfl:   •  clonazePAM (KlonoPIN) 0.5 MG tablet, 1/2 in AM AND 3  AT NIGHT PRN ANXIETY/insomnia, Disp: 20 tablet, Rfl: 0  •  diclofenac (VOLTAREN) 1 % gel gel, Place 4 g on the skin as directed by provider 4 (Four) Times a Day., Disp: , Rfl:   •  estradiol (MINIVELLE, VIVELLE-DOT) 0.05 MG/24HR patch, APPLY ONE PATCH TO SKIN TWO TIMES A WEEK, Disp: , Rfl:   •  medroxyPROGESTERone (PROVERA) 2.5 MG tablet, Take 1 tablet by mouth Daily., Disp: , Rfl:   •  melatonin 5 MG sublingual tablet sublingual tablet, Place  under the tongue At Night As Needed., Disp: , Rfl:   •  Nirmatrelvir&Ritonavir 300/100 (PAXLOVID) 20 x 150 MG & 10 x 100MG tablet therapy pack tablet, Take 3 tablets by mouth 2 (Two) Times a Day for 5 days., Disp: 30 tablet, Rfl: 0  •  omeprazole OTC (PriLOSEC OTC) 20 MG EC tablet, Take 1 tablet by mouth Daily., Disp: , Rfl:   •  phenazopyridine (PYRIDIUM) 200 MG tablet, TAKE 1 TABLET THREE TIMES A DAY AFTER MEALS, Disp: , Rfl:   •  Progesterone (PROMETRIUM) 100 MG capsule, Take 1 capsule by mouth Every Night., Disp: , Rfl:   •  valACYclovir (Valtrex) 1000 MG tablet, Take 1 tablet by mouth 3 (Three) Times a Day. Take for 7 days if outbreak, Disp: 21 tablet, Rfl: 5  •  venlafaxine XR (Effexor XR) 37.5 MG 24 hr capsule, Take 1 capsule by mouth Daily., Disp: 90 capsule, Rfl: 3  •  vitamin B-12 (CYANOCOBALAMIN) 1000 MCG tablet, Take 1 tablet by mouth Daily., Disp: 90 tablet, Rfl: 2    Past Medical History:   Diagnosis Date   • Acute upper respiratory infection    • Anemia    • Anxiety    • Arthritis    • Depression    • External hemorrhoid, thrombosed    • Fibromyalgia    • Folliculitis    • ARCENIO (generalized anxiety disorder)    • Gastroparesis    • GERD (gastroesophageal reflux disease)    • GERD without esophagitis    • Heartburn    • Hemorrhoids    • High risk medication use    • History of staph infection     LAST TIME 4 YEARS AGO INSIDE RIGHT NARIS   •  Hypertriglyceridemia    • Immunization deficiency    • Infected prosthetic mesh of abdominal wall    • Insomnia    • MRSA carrier    • Persistent insomnia    • Pica    • PONV (postoperative nausea and vomiting)    • Shingles    • Strain of rectus abdominis muscle    • Vitamin B12 deficiency    • Volvulus of intestine        Past Surgical History:   Procedure Laterality Date   • ABDOMINOPLASTY     • BACK SURGERY  09/2015    [facility]   • BARIATRIC SURGERY     • BILATERAL BREAST REDUCTION Bilateral 10/2021   • CHOLECYSTECTOMY     • COLECTOMY PARTIAL / TOTAL Right     Right Hemicolectomy   • COLONOSCOPY N/A 07/12/2016    Procedure: COLONOSCOPY into small bowel;  Surgeon: Riley Narayan MD;  [facility];  Service:    • COLONOSCOPY N/A 4/11/2023    Procedure: COLONOSCOPY to anastamosis;  Surgeon: Riley Narayan MD;  [facility];  Service: General;  Laterality: N/A;  normal   • EXPLORATORY LAPAROTOMY N/A 04/20/2016    Procedure: LAPAROTOMY EXPLORATORY, POSSIBLE BOWEL RESECTION, POSSIBLE OSTOMY;  Surgeon: Riley Narayan MD;  Location: [facility] MAIN OR;  Service:    • EXPLORATORY LAPAROTOMY N/A 12/02/2016    Procedure: exploratory laparotomy, removal of  infected abdominal wall mesh. ;  Surgeon: Riley Narayan MD;  Location: [facility] MAIN OR;  Service:    • GASTRIC BYPASS  2000   • HIP ARTHROSCOPY W/ LABRAL REPAIR Left    • LAPAROSCOPIC TUBAL LIGATION     • OTHER SURGICAL HISTORY      LASER ON BACK   • UMBILICAL HERNIA REPAIR         Family History   Problem Relation Age of Onset   • Heart attack Father    • Diabetes Father    • Stroke Father    • Pancreatitis Father    • Diabetes Sister    • Breast cancer Maternal Grandmother    • Diabetes Paternal Grandmother    • Osteoporosis Paternal Grandmother    • Diabetes Paternal Grandfather    • Arthritis Mother    • No Known Problems Other        Social History     Tobacco  Use   • Smoking status: Former   • Smokeless tobacco: Never   • Tobacco comments:     smoked less than 1 pack per day for 10 year   Substance Use Topics   • Alcohol use: Yes     Comment: once every couple of months-7 or less drinks per week            Objective     There were no vitals filed for this visit.  There is no height or weight on file to calculate BMI.    Physical Exam  Constitutional:       Appearance: She is well-developed.   HENT:      Head: Normocephalic and atraumatic.   Pulmonary:      Breath sounds: Normal breath sounds.   Neurological:      Mental Status: She is alert and oriented to person, place, and time.   Psychiatric:         Behavior: Behavior normal.         Thought Content: Thought content normal.         Judgment: Judgment normal.         Lab Results   Component Value Date    GLUCOSE 110 (H) 05/02/2022    BUN 11 05/02/2022    CREATININE 0.67 05/02/2022    EGFRIFNONA 74 08/13/2020    EGFRIFAFRI 90 08/13/2020    BCR 16 05/02/2022    K 4.6 05/02/2022    CO2 21 05/02/2022    CALCIUM 9.0 05/02/2022    PROTENTOTREF 6.8 05/02/2022    ALBUMIN 4.4 05/02/2022    LABIL2 1.8 05/02/2022    AST 13 05/02/2022    ALT 15 05/02/2022       WBC   Date Value Ref Range Status   05/02/2022 6.7 3.4 - 10.8 x10E3/uL Final     RBC   Date Value Ref Range Status   05/02/2022 4.22 3.77 - 5.28 x10E6/uL Final     Hemoglobin   Date Value Ref Range Status   05/02/2022 12.5 11.1 - 15.9 g/dL Final   12/03/2016 9.1 (L) 11.9 - 15.5 g/dL Final     Hematocrit   Date Value Ref Range Status   05/02/2022 39.2 34.0 - 46.6 % Final   12/03/2016 30.2 (L) 35.6 - 45.5 % Final     MCV   Date Value Ref Range Status   05/02/2022 93 79 - 97 fL Final   12/03/2016 88.0 80.5 - 98.2 fL Final     MCH   Date Value Ref Range Status   05/02/2022 29.6 26.6 - 33.0 pg Final   12/03/2016 26.5 (L) 26.9 - 32.0 pg Final     MCHC   Date Value Ref Range Status   05/02/2022 31.9 31.5 - 35.7 g/dL Final   12/03/2016 30.1 (L) 32.4 - 36.3 g/dL Final     RDW    Date Value Ref Range Status   05/02/2022 12.6 11.7 - 15.4 % Final   12/03/2016 14.4 (H) 11.7 - 13.0 % Final     RDW-SD   Date Value Ref Range Status   12/03/2016 46.7 37.0 - 54.0 fl Final     MPV   Date Value Ref Range Status   12/03/2016 10.2 6.0 - 12.0 fL Final     Platelets   Date Value Ref Range Status   05/02/2022 267 150 - 450 x10E3/uL Final   12/03/2016 277 140 - 500 10*3/mm3 Final     Neutrophil Rel %   Date Value Ref Range Status   05/02/2022 63 Not Estab. % Final     Neutrophil %   Date Value Ref Range Status   12/03/2016 83.8 (H) 42.7 - 76.0 % Final     Lymphocyte Rel %   Date Value Ref Range Status   05/02/2022 25 Not Estab. % Final     Lymphocyte %   Date Value Ref Range Status   12/03/2016 7.9 (L) 19.6 - 45.3 % Final     Monocyte Rel %   Date Value Ref Range Status   05/02/2022 6 Not Estab. % Final     Monocyte %   Date Value Ref Range Status   12/03/2016 5.7 5.0 - 12.0 % Final     Eosinophil Rel %   Date Value Ref Range Status   05/02/2022 5 Not Estab. % Final     Eosinophil %   Date Value Ref Range Status   12/03/2016 2.3 0.3 - 6.2 % Final     Basophil Rel %   Date Value Ref Range Status   05/02/2022 1 Not Estab. % Final     Basophil %   Date Value Ref Range Status   12/03/2016 0.1 0.0 - 1.5 % Final     Immature Grans %   Date Value Ref Range Status   12/03/2016 0.2 0.0 - 0.5 % Final     Neutrophils Absolute   Date Value Ref Range Status   05/02/2022 4.3 1.4 - 7.0 x10E3/uL Final     Neutrophils, Absolute   Date Value Ref Range Status   12/03/2016 10.19 (H) 1.90 - 8.10 10*3/mm3 Final     Lymphocytes Absolute   Date Value Ref Range Status   05/02/2022 1.7 0.7 - 3.1 x10E3/uL Final     Lymphocytes, Absolute   Date Value Ref Range Status   12/03/2016 0.96 0.90 - 4.80 10*3/mm3 Final     Monocytes Absolute   Date Value Ref Range Status   05/02/2022 0.4 0.1 - 0.9 x10E3/uL Final     Monocytes, Absolute   Date Value Ref Range Status   12/03/2016 0.69 0.20 - 1.20 10*3/mm3 Final     Eosinophils Absolute   Date  Value Ref Range Status   05/02/2022 0.3 0.0 - 0.4 x10E3/uL Final     Eosinophils, Absolute   Date Value Ref Range Status   12/03/2016 0.28 0.00 - 0.70 10*3/mm3 Final     Basophils Absolute   Date Value Ref Range Status   05/02/2022 0.1 0.0 - 0.2 x10E3/uL Final     Basophils, Absolute   Date Value Ref Range Status   12/03/2016 0.01 0.00 - 0.20 10*3/mm3 Final     Immature Grans, Absolute   Date Value Ref Range Status   12/03/2016 0.03 0.00 - 0.03 10*3/mm3 Final     nRBC   Date Value Ref Range Status   08/13/2020 0.0 0.0 - 0.2 /100 WBC Final       No results found for: HGBA1C    Lab Results   Component Value Date    KVCQOFQG78 626 05/02/2022       TSH   Date Value Ref Range Status   08/13/2020 1.660 0.270 - 4.200 uIU/mL Final       No results found for: CHOL  Lab Results   Component Value Date    TRIG 157 (H) 05/02/2022     Lab Results   Component Value Date    HDL 58 05/02/2022     Lab Results   Component Value Date    LDL 98 05/02/2022     Lab Results   Component Value Date    VLDL 27 05/02/2022     No results found for: LDLHDL      Procedures    Assessment & Plan   Problems Addressed this Visit     Upper respiratory tract infection due to COVID-19 virus - Primary    Relevant Medications    Nirmatrelvir&Ritonavir 300/100 (PAXLOVID) 20 x 150 MG & 10 x 100MG tablet therapy pack tablet   Diagnoses       Codes Comments    Upper respiratory tract infection due to COVID-19 virus    -  Primary ICD-10-CM: U07.1, J06.9  ICD-9-CM: 465.9, 079.89       URI with COVID with systemic symptoms.  Patient desires antiviral treatment.  Patient is at risk of progression to moderate to severe disease.  Will prescribe Paxlovid.    No orders of the defined types were placed in this encounter.      Current Outpatient Medications   Medication Sig Dispense Refill   • Bioflavonoid Products (VITAMIN C) chewable tablet Chew 240 mg 2 (Two) Times a Day.     • Cholecalciferol (VITAMIN D3) 5000 UNITS capsule capsule Take 1 capsule by mouth Daily.      • clonazePAM (KlonoPIN) 0.5 MG tablet 1/2 in AM AND 3  AT NIGHT PRN ANXIETY/insomnia 20 tablet 0   • diclofenac (VOLTAREN) 1 % gel gel Place 4 g on the skin as directed by provider 4 (Four) Times a Day.     • estradiol (MINIVELLE, VIVELLE-DOT) 0.05 MG/24HR patch APPLY ONE PATCH TO SKIN TWO TIMES A WEEK     • medroxyPROGESTERone (PROVERA) 2.5 MG tablet Take 1 tablet by mouth Daily.     • melatonin 5 MG sublingual tablet sublingual tablet Place  under the tongue At Night As Needed.     • Nirmatrelvir&Ritonavir 300/100 (PAXLOVID) 20 x 150 MG & 10 x 100MG tablet therapy pack tablet Take 3 tablets by mouth 2 (Two) Times a Day for 5 days. 30 tablet 0   • omeprazole OTC (PriLOSEC OTC) 20 MG EC tablet Take 1 tablet by mouth Daily.     • phenazopyridine (PYRIDIUM) 200 MG tablet TAKE 1 TABLET THREE TIMES A DAY AFTER MEALS     • Progesterone (PROMETRIUM) 100 MG capsule Take 1 capsule by mouth Every Night.     • valACYclovir (Valtrex) 1000 MG tablet Take 1 tablet by mouth 3 (Three) Times a Day. Take for 7 days if outbreak 21 tablet 5   • venlafaxine XR (Effexor XR) 37.5 MG 24 hr capsule Take 1 capsule by mouth Daily. 90 capsule 3   • vitamin B-12 (CYANOCOBALAMIN) 1000 MCG tablet Take 1 tablet by mouth Daily. 90 tablet 2     No current facility-administered medications for this visit.       Juana Lopez had no medications administered during this visit.    No follow-ups on file.    There are no Patient Instructions on file for this visit.

## 2023-04-27 ENCOUNTER — TELEPHONE (OUTPATIENT)
Dept: FAMILY MEDICINE CLINIC | Facility: CLINIC | Age: 53
End: 2023-04-27

## 2023-04-27 NOTE — TELEPHONE ENCOUNTER
Caller: Juana Lopez    Relationship: Self    Best call back number: [phone]     What medications are you currently taking: PAXLOVID     When did you start taking these medications: STARTED YESTERDAY     Who prescribed you this medication:     What are your concerns: PATIENT CALLING STATING THAT SHE IS HAVING A METALLIC TASTE IN HER MOUTH AS WELL AS HURTING FROM HEAD TO TOE SHE STATED IT HURTS TO WALK SHE READ ON THE BOTTLE THESE COULD BE SIDE EFFECTS SHE WOULD LIKE TO KNOW IF SHE CAN STOP TAKING THIS     How long have you had these concerns: SHE STATED THAT IT STARTED ABOUT 30 MIN AFTER TAKING THE MEDICATION

## 2023-04-28 NOTE — TELEPHONE ENCOUNTER
Ok for hub and  can read message    Tell her metallic taste is from paxlovid but is of no harm.  Pain from head to toe is from covid.  Tell her chocolate and citris flavors help with metallic taste.  Tell her to stay on med as it will help with her pain.

## 2023-05-17 DIAGNOSIS — F41.9 ANXIETY AND DEPRESSION: ICD-10-CM

## 2023-05-17 DIAGNOSIS — F32.A ANXIETY AND DEPRESSION: ICD-10-CM

## 2023-05-17 RX ORDER — CLONAZEPAM 0.5 MG/1
TABLET ORAL
Qty: 315 TABLET | Refills: 0 | Status: SHIPPED | OUTPATIENT
Start: 2023-05-17

## 2023-05-18 DIAGNOSIS — F41.9 ANXIETY AND DEPRESSION: ICD-10-CM

## 2023-05-18 DIAGNOSIS — F32.A ANXIETY AND DEPRESSION: ICD-10-CM

## 2023-05-18 RX ORDER — CLONAZEPAM 0.5 MG/1
TABLET ORAL
Qty: 315 TABLET | Refills: 0 | OUTPATIENT
Start: 2023-05-18

## 2023-05-18 NOTE — TELEPHONE ENCOUNTER
Caller: Juana Lopez    Relationship: Self    Requested Prescriptions:   Requested Prescriptions     Pending Prescriptions Disp Refills   • clonazePAM (KlonoPIN) 0.5 MG tablet 315 tablet 0     Sig: TAKE 1/2 TABLET EVERY      MORNING AND 3 TABLETS AT   NIGHT AS NEEDED FOR        ANXIETY/INSOMNIA        Pharmacy where request should be sent: [facility] - [place], [place] - [facility] - [phone]  - [phone] FX     Last office visit with prescribing clinician: 2/13/2023   Last telemedicine visit with prescribing clinician: 5/17/2023   Next office visit with prescribing clinician: 6/27/2023     Additional details provided by patient: PATIENT WILL BE OUT OF THIS MEDICATION BEFORE HER June APPOINTMENT WITH DR. PALAFOX, AND IT IS BEING FILLED BY MAIL ORDER SO PATIENT DOES NOT WANT TO TAKE A CHANCE OF RUNNING OUT BEFORE IT GETS DELIVERED.       Does the patient have less than a 3 day supply:  [] Yes  [x] No    Would you like a call back once the refill request has been completed: [] Yes [x] No    If the office needs to give you a call back, can they leave a voicemail: [] Yes [x] No    Fito Fermin Rep   05/18/23 09:28 EDT

## 2023-08-24 ENCOUNTER — OFFICE VISIT (OUTPATIENT)
Dept: BEHAVIORAL HEALTH | Facility: CLINIC | Age: 53
End: 2023-08-24
Payer: COMMERCIAL

## 2023-08-24 VITALS
DIASTOLIC BLOOD PRESSURE: 82 MMHG | HEIGHT: 64 IN | BODY MASS INDEX: 32.56 KG/M2 | SYSTOLIC BLOOD PRESSURE: 120 MMHG | RESPIRATION RATE: 16 BRPM | HEART RATE: 78 BPM | WEIGHT: 190.7 LBS | OXYGEN SATURATION: 99 %

## 2023-08-24 DIAGNOSIS — G47.00 INSOMNIA, UNSPECIFIED TYPE: ICD-10-CM

## 2023-08-24 DIAGNOSIS — F41.1 GENERALIZED ANXIETY DISORDER: ICD-10-CM

## 2023-08-24 DIAGNOSIS — F13.20 BENZODIAZEPINE DEPENDENCE: Primary | ICD-10-CM

## 2023-08-24 DIAGNOSIS — F32.1 CURRENT MODERATE EPISODE OF MAJOR DEPRESSIVE DISORDER WITHOUT PRIOR EPISODE: ICD-10-CM

## 2023-08-24 RX ORDER — DOXEPIN HYDROCHLORIDE 10 MG/1
10 CAPSULE ORAL NIGHTLY
Qty: 30 CAPSULE | Refills: 0 | Status: SHIPPED | OUTPATIENT
Start: 2023-08-24

## 2023-08-24 RX ORDER — DOXEPIN HYDROCHLORIDE 25 MG/1
CAPSULE ORAL
Qty: 60 CAPSULE | Refills: 0 | Status: SHIPPED | OUTPATIENT
Start: 2023-08-24 | End: 2023-08-24

## 2023-08-24 RX ORDER — DOXEPIN HYDROCHLORIDE 6 MG/1
TABLET ORAL
Qty: 30 TABLET | Refills: 0 | Status: SHIPPED | OUTPATIENT
Start: 2023-08-24 | End: 2023-08-25

## 2023-08-24 NOTE — PATIENT INSTRUCTIONS
Medication changes: Okay to start doxepin 10 mg take 1/2 to 1 tablet at bedtime as needed for sleep, be in bed within 30 minutes give yourself a 7 to 8-hour period devoted to sleep do not take then drive a car or do something where you can injure yourself do not mix with other sedating medication or alcohol, continue Effexor as previously ordered.  Patient reports she took 2 doxepin's the day after appt and she slept fine, I am okay with her taking 2 for the time being.    Continue Klonopin 1.5 mg a day for 2 weeks then decrease to 1 mg a day for 2 weeks then 0.5 mg a day for 2 weeks. Monitor for S/S of withdrawal including hallucinations report to Ricky immediately.  Patient advised that if hallucinations or other withdrawal symptoms occur she will be advised to go to inpatient detox at a local [facility] hospital like the [facility].  New prescription sent to pharmacy to start on 9/13/2023.    Urgent referral placed to sleep medicine for insomnia.    Patient reports the current schedule she will be out of Klonopin on 9/13/23, will send in refill, soonest in person appointment is 1 month from now I would like to see patient in 2 weeks.    GENERAL NEW PATIENT INSTRUCTIONS:  -The best way to get a hold of Ricky is to call the office at [phone] and ask to leave a message with his medical assistant.  Patient's are not able to message their mental health provider directly via [employer], please give my office up to 48 hours to respond to a patient call/question/refill request.  -Please call 911 or go to the nearest ER if you begin to have thoughts of harming yourself or other people.  -Refill requests will be made during normal office hours, Monday-Friday 8:00-5:30.  Ricky is out of the office on Wednesdays and weekends.  Follow-up appointments must be maintained in order for prescribing to continue.    -Tuesdays and Thursdays are Ricky's in-office days, Mondays and Fridays are his telehealth days.  The decision  to be seen virtually or in person is up to the discretion of the provider, not all behavioral health problems are appropriate for telehealth.    NO SHOW POLICY:  We understand unexpected circumstances arise; however, anytime you miss your appointment we are unable to provide you appropriate care.  In addition, each appointment missed could have been used to provide care for others.  We ask that you call at least 24 hours in advance to cancel or reschedule an appointment. We would like to take this opportunity to remind you of our policy stating patients who miss THREE or more appointments without cancelling or rescheduling 24 hours in advance of the appointment may be subject to cancellation of any further visits with our clinic and recommendation to seek in-person services/visits. Please call [phone] to reschedule your appointment. If there are reasons that make it difficult for you to keep the appointments, please call and let us know how we can help. Please understand that medication prescribing will not continue without seeing your provider.       [facility]   [address] [place] [address]  P: [phone]  F: [phone]
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
## 2023-08-25 ENCOUNTER — TELEPHONE (OUTPATIENT)
Dept: FAMILY MEDICINE CLINIC | Facility: CLINIC | Age: 53
End: 2023-08-25
Payer: COMMERCIAL

## 2023-08-25 PROBLEM — F13.20 BENZODIAZEPINE DEPENDENCE: Status: ACTIVE | Noted: 2023-08-25

## 2023-08-25 RX ORDER — CLONAZEPAM 1 MG/1
TABLET ORAL
Qty: 14 TABLET | Refills: 0 | Status: SHIPPED | OUTPATIENT
Start: 2023-09-13 | End: 2023-10-04

## 2023-08-25 NOTE — TELEPHONE ENCOUNTER
Patient called will be out of [facility] on 9/13/23.  Also patient took Doxepin 10mg waited one and half hours and was not asleep. Patient took an additional 10mg of Doxepin and slept all night. Asking if ok to take 2 Doxepin 10mg for sleep.  Call back number [phone]

## 2023-08-25 NOTE — TELEPHONE ENCOUNTER
Called and informed pt. She said to tell you thank you so much because she has finally been able to have a good night's sleep. I informed her to let us know when she is due for a refill and we can update the script sig.

## 2023-08-25 NOTE — TELEPHONE ENCOUNTER
Please let patient know it is okay to take 2 at bedtime please, if we could reach back out to her either over the phone or by [employer] message, what ever is easier, thank you.

## 2023-09-01 ENCOUNTER — TELEPHONE (OUTPATIENT)
Dept: BEHAVIORAL HEALTH | Facility: CLINIC | Age: 53
End: 2023-09-01
Payer: COMMERCIAL

## 2023-09-01 DIAGNOSIS — F32.1 CURRENT MODERATE EPISODE OF MAJOR DEPRESSIVE DISORDER WITHOUT PRIOR EPISODE: ICD-10-CM

## 2023-09-01 DIAGNOSIS — G47.00 INSOMNIA, UNSPECIFIED TYPE: ICD-10-CM

## 2023-09-01 DIAGNOSIS — F41.1 GENERALIZED ANXIETY DISORDER: ICD-10-CM

## 2023-09-01 RX ORDER — DOXEPIN HYDROCHLORIDE 25 MG/1
CAPSULE ORAL
Qty: 60 CAPSULE | Refills: 0 | Status: SHIPPED | OUTPATIENT
Start: 2023-09-01

## 2023-09-01 NOTE — TELEPHONE ENCOUNTER
Spoke with patient that higher dose doxepin was sent in, with instructions to take 1 or 2 pills a day at bedtime as needed for sleep. Patient verbalized an understanding. Patient's next follow-up with Rafael was re-scheduled for closer date, per Rafael's request, to discuss possible medication changes.

## 2023-09-01 NOTE — PROGRESS NOTES
Patient seen for the first time 1 week ago at that time low-dose doxepin was ordered for sleep, patient called several days later reporting doxepin working great after she took 2 tablets.    Patient called office today, 1 week later stating doxepin is now no longer working requesting alternative agent.  I sent in a higher dose doxepin 25 mg tablets take 1 or 2 by mouth at bedtime as needed for sleep but no further med changes will be made unless she is seen/evaluated.  Patient is also in the middle of tapering down off Klonopin after being on this for multiple years, this also will be exacerbating sleep.  Referral has been placed to sleep medicine as well prior.    Can move up appointment if needed, will route note to MA to pass on to patient.

## 2023-09-01 NOTE — TELEPHONE ENCOUNTER
Patient called to state she has been having trouble sleeping with medication doxepin. Patient reports Rafael was OK with patient increasing dosage to 20mg/day (2 capsules at bed time). Patient reports even with dosage increase that medication has stopped working; she reports not having slept since Sunday, as this is when the medication stopped being effective. Patient requesting alternative medication. Please advise.

## 2023-09-07 ENCOUNTER — TELEMEDICINE (OUTPATIENT)
Dept: BEHAVIORAL HEALTH | Facility: CLINIC | Age: 53
End: 2023-09-07
Payer: COMMERCIAL

## 2023-09-07 DIAGNOSIS — F13.20 BENZODIAZEPINE DEPENDENCE: Primary | ICD-10-CM

## 2023-09-07 DIAGNOSIS — G47.00 INSOMNIA, UNSPECIFIED TYPE: ICD-10-CM

## 2023-09-07 DIAGNOSIS — F41.1 GENERALIZED ANXIETY DISORDER: ICD-10-CM
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
## 2023-09-07 NOTE — PATIENT INSTRUCTIONS
Medication changes: Go back to Klonopin 1 mg X 3 days for crying episodes and overall feelings of being on edge.  After that go back to 0.5 mg X 14 days then 0.25 mg X 14 days then stop.  Continue doxepin 50 mg at bedtime for sleep/anxiety/depression, okay to keep venlafaxine/Effexor at current dose for time being.  Patient has only been on higher dose of doxepin X 2 days.  Patient has initial consult with sleep medicine 11/3/2023, encouraged to keep appointment.  Report S/S of withdrawal discussed today including hallucinations, paranoia, to Ricky immediately.

## 2023-09-12 ENCOUNTER — TELEPHONE (OUTPATIENT)
Dept: FAMILY MEDICINE CLINIC | Facility: CLINIC | Age: 53
End: 2023-09-12
Payer: COMMERCIAL

## 2023-09-12 DIAGNOSIS — F32.1 CURRENT MODERATE EPISODE OF MAJOR DEPRESSIVE DISORDER WITHOUT PRIOR EPISODE: ICD-10-CM

## 2023-09-12 DIAGNOSIS — F41.1 GENERALIZED ANXIETY DISORDER: ICD-10-CM

## 2023-09-12 DIAGNOSIS — G47.00 INSOMNIA, UNSPECIFIED TYPE: ICD-10-CM

## 2023-09-12 RX ORDER — DOXEPIN HYDROCHLORIDE 75 MG/1
75 CAPSULE ORAL NIGHTLY
Qty: 14 CAPSULE | Refills: 0 | Status: SHIPPED | OUTPATIENT
Start: 2023-09-12

## 2023-09-12 NOTE — TELEPHONE ENCOUNTER
Please contact patient and let her know I sent a prescription in for a higher dose doxepin at 75 mg, also instruct patient that worsening insomnia is due to her coming down off Klonopin and if this continues to worsen that she needs to go to inpatient detox but I am not going to be changing medications every handful of days.  Patient was last seen 1 week ago and reported improved sleep with higher dose of doxepin that was increased previously a handful of days prior.  Patient has reach out to office 2 or 3 times requesting med changes during appointments.

## 2023-09-12 NOTE — TELEPHONE ENCOUNTER
Patient called to state that medication has stopped working as of Thursday last week. Patient reports she was able to sleep last Tuesday and Wednesday, but the medication has since become non effective. Patient also reports an increase in hunger associated with this medication; she has gained 3 additional pounds while she is on semaglutide weight loss injection.    Patient forgot to mention to Rafael that she had a gastric bypass 5 years ago and is advised to mention this to providers in the case it may alter metabolism of medications. Patient inquiring about trazodone prescription to replace doxepin. Patient reports having taken this once and a side effect of either hallucinations or night terrors, but would like to try a small quantity to see if this will help her sleep. Please advise.

## 2023-09-12 NOTE — PROGRESS NOTES
Patient last seen 9/5/2023 or roughly 7 days ago at that time patient had been on higher dose of doxepin for 2 days and reported improvement in insomnia.  Prior to that patient was started on doxepin 10 mg and then the dose was increased, patient reached out to office again stating reduced efficacy and dose was then increased.    Since patient last appointment on 9/5/2023 patient reached out yet again today stating medication has not been working X 5 days, requesting to be put on trazodone that patient reported she tried once previously that resulted in her staying up for 3 days straight but now is requesting to try it.  Patient also reports weight gain from doxepin.    Doxepin increased to 75 mg patient needs to continue this dose until follow-up appointment in 2 weeks no other med changes at this time.  Patient has been educated that worsening insomnia is due to her weaning down off Klonopin, patient has been on for multiple years.  Patient tested positive for a opiate that was not prescribed to her several months ago and at that time medication was discontinued/weaned down by PCP and patient was sent to myself, I agreed to continue the weaning down process as long as there is no worsening symptoms of withdrawal, patient educated that she needs to go to inpatient detox if these occur or worsen.

## 2023-09-13 DIAGNOSIS — F13.20 BENZODIAZEPINE DEPENDENCE: Primary | ICD-10-CM

## 2023-09-13 DIAGNOSIS — G47.00 INSOMNIA, UNSPECIFIED TYPE: ICD-10-CM

## 2023-09-13 DIAGNOSIS — F41.1 GENERALIZED ANXIETY DISORDER: ICD-10-CM

## 2023-09-13 NOTE — TELEPHONE ENCOUNTER
Im not willing to change medications again at this time, we can discuss with her at f/u appt, pt understands that she has had a live ling history of insomnia that is not going to be fixed overnight (has a sleep study in November to my understanding) and that sleep is not going to be the greatest while coming off the klonopin, she can move up appt to a day next week if she would like,but I need an in person or a nurses visit for a UDS and vital signs prior to changing meds. She will need weekly appt's if she expected medications to be changed every 5 days.

## 2023-09-13 NOTE — PROGRESS NOTES
81 YO M with a sig PMHx of DM, HTN, prior ETOH abuse, admitted with upper GI Bleed. Patient had CT scan in early September which revealed Chronic emphysematous cholecystitis, possible cholecystogastric fistula on prior scan. Current scan shows collapsed gallbladder.     Will Follow up results of EGD tomorrow  Serial abdominal exams  Continue management per GI and medicine  Discussed with Dr. Mccormack
Need a set of vitals + urine sample next week, prior to moving forward with medication changes.  
HPI:  This is an 82M hx dm, htn, chronic cholecystitis, prior ETOH abuse quit a few mo ago, presenting due to several episodes of coffee ground emesis per daughter. He also appears more lethargic. Poor historian. on presentation juan j 177/84 and ekg shows new onstet AF 93 bpm. labs significant for normal Hgb 14.5, normal platelets, lactate 2.6, mag 1.5. Ct abd/pelvis unremarkable for acute pathology. denies cp, palpitations, syncope, orthopnea, pnd, h/a, abd pain, diarrhea, hematochezia, melena.      (01 Oct 2022 00:48)  ----- As Above ----- Lethargic, History obtained from chart, daughter  The patient had been in his USOH until yesterday when he was found by his daughter to be very lethargic and had black material all over him. He then vomited more black material. The last time he vomited black material, he had vomited a small amount of black material in the beginning of the year. The  last time he drank was fathers day weekend. The daughter denies all NSAIDs.  The patient has  " Chronic Cholecystitis. " The daughter states that they have been trying to get a doctor to put in a fistula.       A) Coffee ground emesis - R/O PUD, gastritis, gastropathy, etc H/H  14.5 14.2  14.1 1) PPI 2) NPO 3) Will eventually need an EGD 4) Hold anticoagulants 5) NGT if it continues  B) Lethargy - Ammonia < 10 1) Head CT  C) " chronic cholecystitis ? - See Ct scan  1) Old records

## 2023-09-14 NOTE — TELEPHONE ENCOUNTER
Offered open appointment to patient for next week, but patient has a sleep study consult that day with [facility] and cannot make appointment time for Tuesday. 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 Will call patient if another opening comes up for an in-office appointment to see if this works with the patient's schedule.

## 2023-09-14 NOTE — TELEPHONE ENCOUNTER
Patient called back to see if she could take an available Thursday appointment for 09.21.23 after working out some arrangements for work. Scheduled patient for in-office visit per Rafael's recommendations.

## 2023-09-21 ENCOUNTER — OFFICE VISIT (OUTPATIENT)
Dept: BEHAVIORAL HEALTH | Facility: CLINIC | Age: 53
End: 2023-09-21
Payer: COMMERCIAL

## 2023-09-21 VITALS
WEIGHT: 190 LBS | DIASTOLIC BLOOD PRESSURE: 70 MMHG | HEIGHT: 64 IN | BODY MASS INDEX: 32.44 KG/M2 | SYSTOLIC BLOOD PRESSURE: 120 MMHG | RESPIRATION RATE: 18 BRPM | HEART RATE: 68 BPM

## 2023-09-21 DIAGNOSIS — G47.00 INSOMNIA, UNSPECIFIED TYPE: ICD-10-CM

## 2023-09-21 DIAGNOSIS — F13.20 BENZODIAZEPINE DEPENDENCE: Primary | ICD-10-CM

## 2023-09-21 DIAGNOSIS — N95.1 HOT FLASHES DUE TO MENOPAUSE: ICD-10-CM

## 2023-09-21 DIAGNOSIS — Z79.899 HIGH RISK MEDICATION USE: ICD-10-CM

## 2023-09-21 DIAGNOSIS — F32.1 CURRENT MODERATE EPISODE OF MAJOR DEPRESSIVE DISORDER WITHOUT PRIOR EPISODE: ICD-10-CM

## 2023-09-21 RX ORDER — ZOLPIDEM TARTRATE 10 MG/1
10 TABLET ORAL NIGHTLY PRN
Qty: 14 TABLET | Refills: 0 | Status: SHIPPED | OUTPATIENT
Start: 2023-09-21 | End: 2023-10-05

## 2023-09-21 RX ORDER — CLONAZEPAM 0.5 MG/1
0.25 TABLET ORAL 2 TIMES DAILY
Qty: 14 TABLET | Refills: 0 | Status: SHIPPED | OUTPATIENT
Start: 2023-09-23 | End: 2023-10-07

## 2023-09-21 RX ORDER — VENLAFAXINE HYDROCHLORIDE 75 MG/1
75 CAPSULE, EXTENDED RELEASE ORAL DAILY
Qty: 30 CAPSULE | Refills: 0 | Status: SHIPPED | OUTPATIENT
Start: 2023-09-21

## 2023-09-21 NOTE — PATIENT INSTRUCTIONS
GENERAL NEW PATIENT INSTRUCTIONS:  -The best way to get a hold of Ricky is to call the office at [phone] and ask to leave a message with his medical assistant.  Patient's are not able to message their mental health provider directly via [employer], please give my office up to 48 hours to respond to a patient call/question/refill request.  -Please call 911 or go to the nearest ER if you begin to have thoughts of harming yourself or other people.  -Refill requests will be made during normal office hours, Monday-Friday 8:00-5:30.  Ricky is out of the office on Wednesdays and weekends.  Follow-up appointments must be maintained in order for prescribing to continue.    -Tuesdays and Thursdays are Ricky's in-office days, Mondays and Fridays are his telehealth days.  The decision to be seen virtually or in person is up to the discretion of the provider, not all behavioral health problems are appropriate for telehealth.    NO SHOW POLICY:  We understand unexpected circumstances arise; however, anytime you miss your appointment we are unable to provide you appropriate care.  In addition, each appointment missed could have been used to provide care for others.  We ask that you call at least 24 hours in advance to cancel or reschedule an appointment. We would like to take this opportunity to remind you of our policy stating patients who miss THREE or more appointments without cancelling or rescheduling 24 hours in advance of the appointment may be subject to cancellation of any further visits with our clinic and recommendation to seek in-person services/visits. Please call [phone] to reschedule your appointment. If there are reasons that make it difficult for you to keep the appointments, please call and let us know how we can help. Please understand that medication prescribing will not continue without seeing your provider.       [facility]   [address] [place] [address]  P:  [phone]  F: [phone]
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
## 2023-09-26 ENCOUNTER — TELEMEDICINE (OUTPATIENT)
Dept: BEHAVIORAL HEALTH | Facility: CLINIC | Age: 53
End: 2023-09-26
Payer: COMMERCIAL

## 2023-09-26 DIAGNOSIS — Z79.899 HIGH RISK MEDICATION USE: ICD-10-CM

## 2023-09-26 DIAGNOSIS — G47.00 INSOMNIA, UNSPECIFIED TYPE: ICD-10-CM

## 2023-09-26 DIAGNOSIS — F13.20 BENZODIAZEPINE DEPENDENCE: Primary | ICD-10-CM

## 2023-09-26 RX ORDER — ZOLPIDEM TARTRATE 10 MG/1
10 TABLET ORAL NIGHTLY PRN
Qty: 14 TABLET | Refills: 0 | Status: SHIPPED | OUTPATIENT
Start: 2023-10-05 | End: 2023-09-27 | Stop reason: SDUPTHER

## 2023-09-26 NOTE — PATIENT INSTRUCTIONS
Medication changes:   Continue Ambien, Klonopin, Effexor as previously ordered, 2-week supply of Ambien sent to pharmacy to begin on 10/5/2023    Dr. Wechsler psychologist, patient was instructed to reach out to provider previously for CBT for insomnia has yet to do so    Patient has sleep study ordered for November.    Continue with EAP therapist at work, trauma focused therapy, has seen 1 time, patient reports she can see therapist 6 times for free    Increase positive coping skills discussed today including light/moderate exercise, time outside of the house, time in nature for stress relief/mental health    GENERAL NEW PATIENT INSTRUCTIONS:  -The best way to get a hold of Ricky is to call the office at [phone] and ask to leave a message with his medical assistant.  Patient's are not able to message their mental health provider directly via [employer], please give my office up to 48 hours to respond to a patient call/question/refill request.  -Please call 911 or go to the nearest ER if you begin to have thoughts of harming yourself or other people.  -Refill requests will be made during normal office hours, Monday-Friday 8:00-5:30.  Ricky is out of the office on Wednesdays and weekends.  Follow-up appointments must be maintained in order for prescribing to continue.    -Tuesdays and Thursdays are Ricky's in-office days, Mondays and Fridays are his telehealth days.  The decision to be seen virtually or in person is up to the discretion of the provider, not all behavioral health problems are appropriate for telehealth.    NO SHOW POLICY:  We understand unexpected circumstances arise; however, anytime you miss your appointment we are unable to provide you appropriate care.  In addition, each appointment missed could have been used to provide care for others.  We ask that you call at least 24 hours in advance to cancel or reschedule an appointment. We would like to take this opportunity to remind you of our policy  stating patients who miss THREE or more appointments without cancelling or rescheduling 24 hours in advance of the appointment may be subject to cancellation of any further visits with our clinic and recommendation to seek in-person services/visits. Please call [phone] to reschedule your appointment. If there are reasons that make it difficult for you to keep the appointments, please call and let us know how we can help. Please understand that medication prescribing will not continue without seeing your provider.       [facility]   [address] [place] [address]  P: [phone]  F: [phone]

## 2023-09-26 NOTE — PROGRESS NOTES
Patient assessed today via MyChart through EPIC pt is at home in a secure environment and verbalizes privacy during interview. MIGUEL García is at home in a secure environment using a secure laptop.The patient's condition being diagnosed/treated is appropriate for telemedicine.The provider identified himself as well as his credentials.The patient, and/or patient's guardian, consent to be seen remotely, and when consent is given they understand that the consent allows for patient identifiable information to be sent to a third party as needed. They may refuse to be seen remotely at any time. The electronic data is encrypted and password protected, and the patient and/or guardian has been advised of the potential risks to privacy not withstanding such measures.    [facility] PROGRESS NOTE  [facility]  [address], [place] [address]    NAME: Juana Lopez     : 1970   MRN: [id number]     DATE: 2023    ALLERGY:Patient has no known allergies.     MEDICATIONS:  clonazePAM  diclofenac gel  estradiol  omeprazole OTC  Progesterone  Semaglutide(0.25 or 0.5MG/DOS) solution pen-injector  valACYclovir  venlafaxine XR  vitamin B-12  Vitamin C chewable tablet  vitamin D3 capsule  zolpidem     VITALS: There were no vitals taken for this visit. Patient's last menstrual period was 2016.     Subjective     Chief Complaint   Patient presents with    Anxiety    Insomnia    Follow-up      HPI:  53 y.o. female patient seen for follow up.  At last appointment, Klonopin tapering was continued, doxepin was discontinued, Effexor was increased and Ambien 10 mg at bedtime was ordered based on patient reported history of medication and dosage use.  Since that time patient reports she was able to sleep all night with Ambien, but anxiety has been worse due to neighbors dog attacking herself and her mother, patient had to go to urgent care for a bite, law enforcement was contacted as well as animal  control, patient reports she has had crying spells for the past 2 days and is seeing a therapist for trauma, patient reports dog is still next-door embarks which is triggering for her.  Patient working with niece who is a nurse practitioner for over-the-counter supplements to aid in sleep, is taking MELISSA plus which I am fine with for the time being.  Patient was seen less than 1 week ago, reports she has yet to reach out to Dr. Calvert for CBT for insomnia.    SOCIAL STATUS:  Ethnic Group: Not  Or   Race: White Or   Marital Status: Single   Employment status: Full Time [employer]       SUBSTANCE/SEXUAL HISTORY:   reports that she quit smoking about 11 years ago. Her smoking use included cigarettes. She has a 1.25 pack-year smoking history. She has been exposed to tobacco smoke. She has never used smokeless tobacco. She reports current alcohol use. She reports that she does not use drugs.   reports that she is not currently sexually active and has had partner(s) who are male. She reports using the following method of birth control/protection: Tubal ligation.      FAMILY HISTORY:  family history includes Arthritis in her mother; Breast cancer in her maternal grandmother; Diabetes in her father, paternal grandfather, paternal grandmother, and sister; Heart attack in her father; No Known Problems in an other family member; Osteoporosis in her paternal grandmother; Pancreatitis in her father; Stroke in her father.     PAST MEDICAL HISTORY   has a past medical history of Acute upper respiratory infection, Anemia, Anxiety, Arthritis, Benzodiazepine dependence (8/25/2023), Chronic pain disorder, Depression, External hemorrhoid, thrombosed, Fibromyalgia, Folliculitis, ARCENIO (generalized anxiety disorder), Gastroparesis, GERD (gastroesophageal reflux disease), GERD without esophagitis, Heartburn, Hemorrhoids, High risk medication use, History of staph infection, Hypertriglyceridemia,  Immunization deficiency, Infected prosthetic mesh of abdominal wall, Insomnia, MRSA carrier, Persistent insomnia, Pica, PONV (postoperative nausea and vomiting), Shingles, Strain of rectus abdominis muscle, Vitamin B12 deficiency, and Volvulus of intestine.    She has no past medical history of ADHD (attention deficit hyperactivity disorder), Alcohol abuse, Alcoholism, Anorexia nervosa, Autism spectrum disorder, Bipolar disorder, Borderline personality disorder, Bulimia nervosa, Cancer, Disease of thyroid gland, Hard to intubate, Head injury, HIV disease, Liver disease, Malignant hyperthermia due to anesthesia, Obsessive-compulsive disorder, Oppositional defiant disorder, Panic disorder, Peripheral neuropathy, Psychiatric illness, Psychosis, PTSD (post-traumatic stress disorder), Schizoaffective disorder, Seizures, Self-injurious behavior, Spinal headache, Suicide attempt, Violence, history of, Withdrawal symptoms, alcohol, or Withdrawal symptoms, drug or narcotic.     Review of Systems   Constitutional: Negative.  Negative for chills and fever.   Respiratory:  Negative for chest tightness and shortness of breath.    Cardiovascular:  Negative for chest pain.   Gastrointestinal:  Negative for nausea and vomiting.   Neurological:  Negative for dizziness and light-headedness.   Psychiatric/Behavioral:  Positive for decreased concentration, sleep disturbance and stress. Negative for suicidal ideas. The patient is nervous/anxious.      Objective   Physical Exam  Constitutional:       Appearance: Normal appearance.   HENT:      Head: Normocephalic.   Pulmonary:      Effort: Pulmonary effort is normal.   Skin:     General: Skin is dry.   Neurological:      General: No focal deficit present.      Mental Status: She is alert and oriented to person, place, and time.   Psychiatric:         Mood and Affect: Mood normal.         Behavior: Behavior normal.         Thought Content: Thought content normal.     MENTAL STATUS EXAM    General Appearance:  Cleanly groomed and dressed  Eye Contact:  Good eye contact  Attitude:  Cooperative  Motor Activity:  Fidgety  Speech:  Normal rate, tone, volume  Mood and affect:  Anxious and depressed  Thought Process:  Goal-directed  Associations/ Thought Content:  No delusions  Hallucinations:  None  Suicidal Ideations:  Not present  Homicidal Ideation:  Not present  Sensorium:  Alert  Orientation:  Person, place, time and situation  Attention Span/ Concentration:  Easily distracted  Fund of Knowledge:  Appropriate for age and educational level  Intellectual Functioning:  Average range  Insight:  Fair  Judgement:  Fair  Reliability:  Fair  Impulse Control:  Fair     LABS:  No visits with results within 2 Month(s) from this visit.   Latest known visit with results is:   Office Visit on 06/27/2023   Component Date Value Ref Range Status    Report Summary 06/27/2023 FINAL   Final    Comment: ====================================================================  [facility]NALYS[facility]  ====================================================================  Test                             Result       Flag       Units  Drug Present    7-aminoclonazepam              803                     ng/mg creat     7-aminoclonazepam is an expected metabolite of clonazepam. Source     of clonazepam is a scheduled prescription medication.    Hydrocodone                    432                     ng/mg creat    Hydromorphone                  157                     ng/mg creat    Dihydrocodeine                 265                     ng/mg creat    Norhydrocodone                 1578                    ng/mg creat     Sources of hydrocodone include scheduled prescription     medications. Hydromorphone, dihydrocodeine and norhydrocodone are     expected metabolites of hydrocodone. Hydromorphone and     dihydrocodeine are also available as scheduled prescription     medications.    V                            enlafaxine                    PRESENT    Desmethylvenlafaxine           PRESENT     Desmethylvenlafaxine is an expected metabolite of venlafaxine.    Acetaminophen                  PRESENT    Diphenhydramine                PRESENT  ====================================================================  Test                      Result    Flag   Units      Ref Range    Creatinine              37               mg/dL      >=20  ====================================================================  Declared Medications:   Medication list was not provided.  ====================================================================  For clinical consultation, please call [phone].  ====================================================================        Assessment    ASSESSMENT/TREATMENT PLAN/INSTRUCTIONS/EDUCATION    (F13.20) Benzodiazepine dependence    (Z79.899) High risk medication use - Plan: zolpidem (Ambien) 10 MG tablet    (G47.00) Insomnia, unspecified type - Plan: zolpidem (Ambien) 10 MG tablet     -Benzo dependence: Improving with tapering schedule  -Insomnia: Improved with ambien 10 mg  -Anxiety: Worsening, tied to situational stress with dog bite/attack on herself and her mother as well as weaning down of Klonopin.    AFTER VISIT SUMMARY INSTRUCTIONS:    Medication changes:   Continue Ambien, Klonopin, Effexor as previously ordered, 2-week supply of Ambien sent to pharmacy to begin on 10/5/2023     Dr. Wechsler psychologist, patient was instructed to reach out to provider previously for CBT for insomnia has yet to do so    Patient has sleep study ordered for November.    Continue with E[employer] therapist at work, trauma focused therapy, has seen 1 time, patient reports she can see therapist 6 times for free    Increase positive coping skills discussed today including light/moderate exercise, time outside of the house, time in nature for stress relief/mental health    Return in about 2 weeks (around 10/10/2023) for  Medication Check.    PSYCHOTHERAPY:  In/Start Time: 1400.  Out/Stop Time: 1416.  16 minutes of face to face direct patient care with patient was spent for supportive psychotherapy including strengthening self awareness and insights, strengthening coping skills, counseling the patient regarding diagnoses, and utilizing cognitive behavioral therapy to assist the patient in recognizing more appropriate coping mechanisms which are proven effective in reducing the severity of frequency of symptoms.  This APRN assisted the patient in processing the patient's diagnoses, and also acknowledged and normalized the patient's thoughts, feelings, and concerns This APRN allowed the patient to freely discuss issues without interruption or judgment.      ADDITIONAL MONITORING FOR CONTROLLED SUBSTANCE:  -Narc Contract signed, renewed yearly: 9/23      -CARLOS ALBERTO OVALLE 3M minimum scanned into EMR: 9/23  -PDMP via EMR interface reviewed during f/u patricia'ts and med refill requests  -UDS: random     Medications Discontinued During This Encounter   Medication Reason    zolpidem (Ambien) 10 MG tablet Reorder       New Medications Ordered This Visit   Medications    zolpidem (Ambien) 10 MG tablet     Sig: Take 1 tablet by mouth At Night As Needed for Sleep for up to 14 days.     Dispense:  14 tablet     Refill:  0        No orders of the defined types were placed in this encounter.    -Barriers: high risk medication and stress  -Strengths:  motivated  and positive attitude    -Progress toward goal: Not at goal  -Functional Status: Moderate impairment   -Prognosis: Fair with Ongoing Treatment      Part of this note may be an electronic transcription/translation of spoken language to printed text using the Dragon Dictation System. Some of the data in this electronic note has been brought forward from a previous encounter, any necessary changes have been made, it has been reviewed by this APRN, and it is accurate.    This document has been  electronically signed by BELLA Farah September 26, 2023 14:24 EDT

## 2023-09-27 DIAGNOSIS — G47.00 INSOMNIA, UNSPECIFIED TYPE: ICD-10-CM

## 2023-09-27 DIAGNOSIS — Z79.899 HIGH RISK MEDICATION USE: ICD-10-CM

## 2023-09-27 LAB — DRUGS UR: NORMAL

## 2023-09-27 RX ORDER — ZOLPIDEM TARTRATE 10 MG/1
10 TABLET ORAL NIGHTLY PRN
Qty: 14 TABLET | Refills: 0 | Status: SHIPPED | OUTPATIENT
Start: 2023-10-05 | End: 2023-10-19

## 2023-09-27 NOTE — TELEPHONE ENCOUNTER
Patient called to request changing pharmacy for future refill of zolpidem 10mg tablet to [facility] pharmacy on [address]. This prescription is not due to fill until 10.05.23. Pended pharmacy below. Please let me know if you would like me to call [facility] to d/c refill there.    LAST VISIT - 09.26.23  NEXT VISIT - 10.16.23    Patient is requesting future refills from prescriber BELLA Farah, to be sent to [facility] pharmacy on [address] due to [facility] long wait times

## 2023-10-16 ENCOUNTER — TELEMEDICINE (OUTPATIENT)
Dept: BEHAVIORAL HEALTH | Facility: CLINIC | Age: 53
End: 2023-10-16
Payer: COMMERCIAL

## 2023-10-16 VITALS
HEART RATE: 79 BPM | BODY MASS INDEX: 32.44 KG/M2 | WEIGHT: 190 LBS | SYSTOLIC BLOOD PRESSURE: 145 MMHG | HEIGHT: 64 IN | DIASTOLIC BLOOD PRESSURE: 89 MMHG

## 2023-10-16 DIAGNOSIS — F41.1 GENERALIZED ANXIETY DISORDER: ICD-10-CM

## 2023-10-16 DIAGNOSIS — Z79.899 HIGH RISK MEDICATION USE: ICD-10-CM

## 2023-10-16 DIAGNOSIS — F99 INSOMNIA DUE TO OTHER MENTAL DISORDER: ICD-10-CM

## 2023-10-16 DIAGNOSIS — F13.20 BENZODIAZEPINE DEPENDENCE: Primary | ICD-10-CM

## 2023-10-16 DIAGNOSIS — F51.05 INSOMNIA DUE TO OTHER MENTAL DISORDER: ICD-10-CM

## 2023-10-16 DIAGNOSIS — F32.1 CURRENT MODERATE EPISODE OF MAJOR DEPRESSIVE DISORDER WITHOUT PRIOR EPISODE: ICD-10-CM

## 2023-10-16 PROCEDURE — 99215 OFFICE O/P EST HI 40 MIN: CPT

## 2023-10-16 RX ORDER — VENLAFAXINE HYDROCHLORIDE 150 MG/1
150 CAPSULE, EXTENDED RELEASE ORAL DAILY
Qty: 30 CAPSULE | Refills: 1 | Status: SHIPPED | OUTPATIENT
Start: 2023-10-16

## 2023-10-16 RX ORDER — ZOLPIDEM TARTRATE 10 MG/1
10 TABLET ORAL NIGHTLY PRN
Qty: 30 TABLET | Refills: 0 | Status: SHIPPED | OUTPATIENT
Start: 2023-10-16

## 2023-10-16 RX ORDER — CLONIDINE HYDROCHLORIDE 0.1 MG/1
0.1 TABLET ORAL NIGHTLY
Qty: 30 TABLET | Refills: 1 | Status: SHIPPED | OUTPATIENT
Start: 2023-10-16

## 2023-10-16 NOTE — PROGRESS NOTES
Patient assessed today via MyChart through EPIC pt is at home in a secure environment and verbalizes privacy during interview. MIGUEL aGrcía is at home in a secure environment using a secure laptop.The patient's condition being diagnosed/treated is appropriate for telemedicine.The provider identified himself as well as his credentials.The patient, and/or patient's guardian, consent to be seen remotely, and when consent is given they understand that the consent allows for patient identifiable information to be sent to a third party as needed. They may refuse to be seen remotely at any time. The electronic data is encrypted and password protected, and the patient and/or guardian has been advised of the potential risks to privacy not withstanding such measures.    Subjective    PATIENT ID: Juana Lopez, :1970, MRN: [id number]    CC : Anxiety, Insomnia, and Addiction Problem    HPI:   53 y.o. female pt presents to [facility] 10/16/2023, last seen roughly 3 weeks ago, patient reports things have been rough, is exhibiting mild/moderate symptoms of withdrawal, has been off of Klonopin for a total 11 days, reports increased anxiety, agitation, decreased appetite, diarrhea, nausea, and poor sleep, has been staying with sister on/off, denies hallucinations, denies tremors, denies vomiting.     SUBSTANCE/SEXUAL HISTORY:  Social History     Socioeconomic History    Marital status: Single    Number of children: 2   Tobacco Use    Smoking status: Former     Packs/day: 0.25     Years: 5.00     Additional pack years: 0.00     Total pack years: 1.25     Types: Cigarettes     Quit date:      Years since quittin.7     Passive exposure: Past    Smokeless tobacco: Never    Tobacco comments:     smoked less than 1 pack per day for 10 year   Vaping Use    Vaping Use: Never used   Substance and Sexual Activity    Alcohol use: Yes     Comment: once every couple of months-7 or less drinks per  week    Drug use: No    Sexual activity: Not Currently     Partners: Male     Birth control/protection: Tubal ligation      FAMILY HISTORY:  family history includes Arthritis in her mother; Breast cancer in her maternal grandmother; Diabetes in her father, paternal grandfather, paternal grandmother, and sister; Heart attack in her father; No Known Problems in an other family member; Osteoporosis in her paternal grandmother; Pancreatitis in her father; Stroke in her father.     PAST MEDICAL HISTORY   has a past medical history of Acute upper respiratory infection, Anemia, Anxiety, Arthritis, Benzodiazepine dependence (8/25/2023), Chronic pain disorder, Depression, External hemorrhoid, thrombosed, Fibromyalgia, Folliculitis, ARCENIO (generalized anxiety disorder), Gastroparesis, GERD (gastroesophageal reflux disease), GERD without esophagitis, Heartburn, Hemorrhoids, High risk medication use, History of staph infection, Hypertriglyceridemia, Immunization deficiency, Infected prosthetic mesh of abdominal wall, Insomnia, MRSA carrier, Persistent insomnia, Pica, PONV (postoperative nausea and vomiting), Shingles, Strain of rectus abdominis muscle, Vitamin B12 deficiency, and Volvulus of intestine.    She has no past medical history of ADHD (attention deficit hyperactivity disorder), Alcohol abuse, Alcoholism, Anorexia nervosa, Autism spectrum disorder, Bipolar disorder, Borderline personality disorder, Bulimia nervosa, Cancer, Disease of thyroid gland, Hard to intubate, Head injury, HIV disease, Liver disease, Malignant hyperthermia due to anesthesia, Obsessive-compulsive disorder, Oppositional defiant disorder, Panic disorder, Peripheral neuropathy, Psychiatric illness, Psychosis, PTSD (post-traumatic stress disorder), Schizoaffective disorder, Seizures, Self-injurious behavior, Spinal headache, Suicide attempt, Violence, history of, Withdrawal symptoms, alcohol, or Withdrawal symptoms, drug or narcotic.     Review of  "Systems   Constitutional:  Positive for appetite change and fatigue.   Respiratory:  Positive for chest tightness.    Cardiovascular:  Positive for palpitations.   Gastrointestinal:  Positive for diarrhea and nausea. Negative for vomiting.   Neurological:  Positive for headache. Negative for seizures.   Psychiatric/Behavioral:  Positive for agitation, decreased concentration, sleep disturbance and depressed mood. Negative for hallucinations and suicidal ideas. The patient is nervous/anxious.        Objective   Visit Vitals  /89   Pulse 79   Ht 162.6 cm (64\")   Wt 86.2 kg (190 lb)   LMP 07/12/2016   BMI 32.61 kg/m²        Physical Exam  Constitutional:       Appearance: Normal appearance.   HENT:      Head: Normocephalic.   Pulmonary:      Effort: Pulmonary effort is normal.   Skin:     General: Skin is dry.   Neurological:      General: No focal deficit present.      Mental Status: She is alert and oriented to person, place, and time.   Psychiatric:         Mood and Affect: Mood normal.         Behavior: Behavior normal.         Thought Content: Thought content normal.       MENTAL STATUS EXAM   General Appearance:  Cleanly groomed and dressed  Eye Contact:  Good eye contact  Attitude:  Cooperative  Motor Activity:  Normal gait, posture  Speech:  Normal rate, tone, volume  Mood and affect:  Anxious and depressed  Thought Process:  Goal-directed  Associations/ Thought Content:  No delusions  Hallucinations:  None  Suicidal Ideations:  Not present  Homicidal Ideation:  Not present  Sensorium:  Alert  Orientation:  Person, place, time and situation  Attention Span/ Concentration:  Good  Fund of Knowledge:  Appropriate for age and educational level  Intellectual Functioning:  Average range  Insight:  Fair  Judgement:  Fair  Reliability:  Fair      PHQ-9 Depression Screening  Little interest or pleasure in doing things? (P) 3-->nearly every day   Feeling down, depressed, or hopeless? (P) 3-->nearly every day "   Trouble falling or staying asleep, or sleeping too much? (P) 3-->nearly every day   Feeling tired or having little energy?     Poor appetite or overeating? (P) 3-->nearly every day   Feeling bad about yourself/you are a failure/have let yourself or your family down? (P) 3-->nearly every day   Trouble concentrating on things? (P) 3-->nearly every day   Psychomotor agitation/retardation (P) 3-->nearly every day   Thoughts about death/dying/suicide (P) 0-->not at all   PHQ-9 Total Score (P) 24   How difficult have these problems for you? (P) very difficult     GAD7 Documentation:  Feeling nervous, anxious or on edge (P) 3   Not being able to stop or control worrying (P) 3   Worrying too much about different things (P) 1   Trouble relaxing (P) 3   Being so restless that it is hard to sit still (P) 3   Becoming easily annoyed or irritable (P) 3   Feeling Afraid as if something awful might happen (P) 3   ARCENIO Total Score (P) 19   How difficult have these problems made it for you? (P) Extremely difficult     LABS:  Office Visit on 09/21/2023   Component Date Value Ref Range Status    Report Summary 09/21/2023 FINAL   Final    Comment: ====================================================================  [facility] DRUG ANALYSIS,[facility]  ====================================================================  Test                             Result       Flag       Units  Drug Present and Declared for Prescription Verification    7-aminoclonazepam              334          EXPECTED   ng/mg creat     7-aminoclonazepam is an expected metabolite of clonazepam. Source     of clonazepam is a scheduled prescription medication.    Doxepin                        PRESENT      EXPECTED    Desmethyldoxepin               PRESENT      EXPECTED     Desmethyldoxepin is an expected metabolite of doxepin.    Venlafaxine                    PRESENT      EXPECTED    Desmethylvenlafaxine           PRESENT      EXPECTED     Desmethylvenlafaxine is  an expected metabolite of venlafaxine.  Drug Present not Declared for Prescription Verification    Acetaminophen                  PRESENT      UNEXPECTED    Diphenhydramine                PRESENT                                 UNEXPECTED  ====================================================================  Test                      Result    Flag   Units      Ref Range    Creatinine              59               mg/dL      >=20  ====================================================================  Declared Medications:   The flagging and interpretation on this report are based on the   following declared medications.  Unexpected results may arise from   inaccuracies in the declared medications.   **Note: The testing scope of this panel includes these medications:   Clonazepam (Klonopin)   Doxepin   Venlafaxine (Effexor)  ====================================================================  For clinical consultation, please call [phone].  ====================================================================        No Known Allergies     Current Outpatient Medications   Medication Instructions    Bioflavonoid Products (VITAMIN C) chewable tablet 240 mg, Oral, 2 Times Daily    cloNIDine (CATAPRES) 0.1 mg, Oral, Nightly, MONITOR FOR S/S OF LOW BP, REPORT TO PROVIDER IF THEY OCCUR    diclofenac (VOLTAREN) 4 g, Transdermal, 4 Times Daily    estradiol (MINIVELLE, VIVELLE-DOT) 0.05 MG/24HR patch APPLY ONE PATCH TO SKIN TWO TIMES A WEEK    omeprazole OTC (PRILOSEC OTC) 20 mg, Oral, Daily    Progesterone (PROMETRIUM) 100 mg, Oral, Nightly    Semaglutide(0.25 or 0.5MG/DOS) (OZEMPIC) 0.5 mg, Subcutaneous, Weekly    valACYclovir (VALTREX) 1,000 mg, Oral, 3 Times Daily, Take for 7 days if outbreak    venlafaxine XR (EFFEXOR XR) 150 mg, Oral, Daily    vitamin B-12 (CYANOCOBALAMIN) 1,000 mcg, Oral, Daily    vitamin D3 5,000 Units, Oral, Daily    zolpidem (AMBIEN) 10 mg, Oral, Nightly PRN      Assessment     ASSESSMENT/TREATMENT PLAN/INSTRUCTIONS/EDUCATION     (F13.20) Benzodiazepine dependence - Plan: venlafaxine XR (Effexor XR) 150 MG 24 hr capsule, zolpidem (Ambien) 10 MG tablet, cloNIDine (Catapres) 0.1 MG tablet    (F41.1) Generalized anxiety disorder - Plan: venlafaxine XR (Effexor XR) 150 MG 24 hr capsule, zolpidem (Ambien) 10 MG tablet, cloNIDine (Catapres) 0.1 MG tablet    (F51.05,  F99) Insomnia due to other mental disorder - Plan: venlafaxine XR (Effexor XR) 150 MG 24 hr capsule, zolpidem (Ambien) 10 MG tablet, cloNIDine (Catapres) 0.1 MG tablet    (F32.1) Current moderate episode of major depressive disorder without prior episode - Plan: venlafaxine XR (Effexor XR) 150 MG 24 hr capsule, zolpidem (Ambien) 10 MG tablet, cloNIDine (Catapres) 0.1 MG tablet    (Z79.899) High risk medication use - Plan: zolpidem (Ambien) 10 MG tablet, cloNIDine (Catapres) 0.1 MG tablet     -Overall patient is at baseline, reports worsening anxiety/sleep since being off of the Klonopin X 11 days which is expected, S/S of withdrawal reported, not significant enough to warrant inpatient treatment, patient educated on risk versus benefit of clonidine and its dual purpose use for anxiety/detox/sleep, has blood pressure cuff at home that she will check daily, patient reports Ambien is somewhat beneficial currently, agreed to increase dose of Effexor and for patient to follow-up in 3 weeks.    AFTER VISIT SUMMARY INSTRUCTIONS:    Medication changes:    Effexor, increase to 150 mg a day  Ambien 10 mg, continue nightly as needed  Clonidine 0.1 mg, take and be in bed within 30 minutes, check blood pressure daily, patient educated on S/S of low BP report to Ricky immediately.  Therapy recommendation:   EAP therapist through work    -Please call the office at [phone] with any worsening of symptoms or onset of intolerable side effects, please ask to leave a message with Ricky's medical assistant.  Please give my office up to 48 hours to  respond to a patient call/question/refill request.  -Patient is agreeable to call 911 or go to the nearest ER should he/she/they have any thoughts of harm to self or others.  -Patient has been educated on providers schedule, contact information, and patient/provider expectations.     FOLLOW UP: Return in 3 weeks (on 11/6/2023) for ANXIETY, INSOMNIA, MED CHANGES.    ADDITIONAL MONITORING FOR CONTROLLED SUBSTANCE:  -Narc Contract signed, renewed yearly: 9/23      -CARLOS ALBERTO OVALLE 3M minimum scanned into EMR: 9/23  -PDMP via EMR interface reviewed during f/u patricia'ts and med refill requests  -UDS: random     Medications Discontinued During This Encounter   Medication Reason    clonazePAM (KlonoPIN) 0.5 MG tablet *Therapy completed    venlafaxine XR (Effexor XR) 75 MG 24 hr capsule Reorder    zolpidem (Ambien) 10 MG tablet Reorder     New Medications Ordered This Visit   Medications    venlafaxine XR (Effexor XR) 150 MG 24 hr capsule     Sig: Take 1 capsule by mouth Daily.     Dispense:  30 capsule     Refill:  1    zolpidem (Ambien) 10 MG tablet     Sig: Take 1 tablet by mouth At Night As Needed for Sleep.     Dispense:  30 tablet     Refill:  0    cloNIDine (Catapres) 0.1 MG tablet     Sig: Take 1 tablet by mouth Every Night. MONITOR FOR S/S OF LOW BP, REPORT TO PROVIDER IF THEY OCCUR     Dispense:  30 tablet     Refill:  1      No orders of the defined types were placed in this encounter.    -Barriers: high risk medication, multiple psychiatric comorbidities , and stress  -Strengths:  motivated  and strong support system    -Progress toward goal: Not at Goal  -Functional Status: Moderate impairment   -Prognosis: Guarded with Ongoing Treatment     SUMMARY/DISCUSSION:  Pt past social/medical/family history reviewed/updated. ROS conducted, medications/allergies, reviewed.  Most recent vitals/labs reviewed. Pt was given appropriate time to ask questions and concerns were addressed. A thorough discussion was had that included  review of disease process, need for continued monitoring and additional treatment options including use of pharmacological and non-pharmacological approaches to care, decisions were made and agreed upon by patient and provider. Discussed the risks, benefits, and potential side effects of the medications; patient ackowledged and verbally consented.     Part of this note may be an electronic transcription/translation of spoken language to printed text using the Dragon Dictation System. Some of the data in this electronic note has been brought forward from a previous encounter, any necessary changes have been made, it has been reviewed by this APRN, and it is accurate.    A total of 40 minutes was spent caring for patient today, that time included reviewing past note, updating patient information in the chart, assessing and educating patient on condition being treated, placing orders, and documenting in the record.    This document has been electronically signed by BELLA Farah October 16, 2023 17:17 EDT

## 2023-10-16 NOTE — PATIENT INSTRUCTIONS
Medication changes:    Effexor, increase to 150 mg a day  Ambien 10 mg, continue nightly as needed  Clonidine 0.1 mg, take and be in bed within 30 minutes, check blood pressure daily, patient educated on S/S of low BP report to Ricky immediately.  Therapy recommendation:   EAP therapist through work    GENERAL NEW PATIENT INSTRUCTIONS:  -The best way to get a hold of Ricky is to call the office at [phone] and ask to leave a message with his medical assistant.  Patient's are not able to message their mental health provider directly via [employer], please give my office up to 48 hours to respond to a patient call/question/refill request.  -Please call 911 or go to the nearest ER if you begin to have thoughts of harming yourself or other people.  -Refill requests will be made during normal office hours, Monday-Friday 8:00-5:30.  Ricky is out of the office on Wednesdays and weekends.  Follow-up appointments must be maintained in order for prescribing to continue.    -Tuesdays and Thursdays are Ricky's in-office days, Mondays and Fridays are his telehealth days.  The decision to be seen virtually or in person is up to the discretion of the provider, not all behavioral health problems are appropriate for telehealth.    NO SHOW POLICY:  We understand unexpected circumstances arise; however, anytime you miss your appointment we are unable to provide you appropriate care.  In addition, each appointment missed could have been used to provide care for others.  We ask that you call at least 24 hours in advance to cancel or reschedule an appointment. We would like to take this opportunity to remind you of our policy stating patients who miss THREE or more appointments without cancelling or rescheduling 24 hours in advance of the appointment may be subject to cancellation of any further visits with our clinic and recommendation to seek in-person services/visits. Please call [phone] to reschedule your appointment. If there are  reasons that make it difficult for you to keep the appointments, please call and let us know how we can help. Please understand that medication prescribing will not continue without seeing your provider.       [facility]   [address] [place], [place] [address]  P: [phone]  F: [phone]

## 2023-10-27 ENCOUNTER — OFFICE VISIT (OUTPATIENT)
Dept: FAMILY MEDICINE CLINIC | Facility: CLINIC | Age: 53
End: 2023-10-27
Payer: COMMERCIAL

## 2023-10-27 VITALS
BODY MASS INDEX: 32.44 KG/M2 | RESPIRATION RATE: 18 BRPM | HEIGHT: 64 IN | WEIGHT: 190 LBS | TEMPERATURE: 97.6 F | SYSTOLIC BLOOD PRESSURE: 122 MMHG | HEART RATE: 82 BPM | OXYGEN SATURATION: 98 % | DIASTOLIC BLOOD PRESSURE: 78 MMHG
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
## 2023-10-27 DIAGNOSIS — S41.151D DOG BITE OF RIGHT UPPER EXTREMITY, SUBSEQUENT ENCOUNTER: Primary | ICD-10-CM

## 2023-10-27 DIAGNOSIS — Z23 IMMUNIZATION DUE: ICD-10-CM

## 2023-10-27 PROBLEM — S41.151A DOG BITE OF RIGHT UPPER EXTREMITY: Status: ACTIVE | Noted: 2023-10-27
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
## 2023-10-27 RX ORDER — AMOXICILLIN AND CLAVULANATE POTASSIUM 875; 125 MG/1; MG/1
1 TABLET, FILM COATED ORAL 2 TIMES DAILY
Qty: 20 TABLET | Refills: 0 | Status: SHIPPED | OUTPATIENT
Start: 2023-10-27

## 2023-10-27 NOTE — PROGRESS NOTES
"Chief Complaint  Animal Bite (5w ago, has a knot that is painful in between the teeth marks.)    Subjective        Media ZINA Lopez presents to [facility]  Animal Bite       Sept 25 got bit by neighbors Woodrow Garrett and went to [facility] the next day and got tdap and abx but didn't finish the augmentin bc of SE- she took about 5 pills today.  .  This occurred on her upper rt arm.  She noticed a knot on the area about 2 weeks ago.  It was painful and still is painful to touch.  No fever or chills. No discharge.  Shot records for dog were utd.     Objective   Vital Signs:  /78 (BP Location: Left arm, Patient Position: Sitting, Cuff Size: Adult)   Pulse 82   Temp 97.6 °F (36.4 °C) (Tympanic)   Resp 18   Ht 162.6 cm (64.02\")   Wt 86.2 kg (190 lb)   SpO2 98%   BMI 32.60 kg/m²   Estimated body mass index is 32.6 kg/m² as calculated from the following:    Height as of this encounter: 162.6 cm (64.02\").    Weight as of this encounter: 86.2 kg (190 lb).               Physical Exam  Vitals and nursing note reviewed.   Skin:     Comments: 2 puncture wound areas healing well on right upper arm.  Bw the wounds there is an area that appears cystic and ttp.  Non indurated and no redness or warmth to touch.     Neurological:      General: No focal deficit present.      Mental Status: She is oriented to person, place, and time.   Psychiatric:         Mood and Affect: Mood normal.         Behavior: Behavior normal.        Result Review :                   Assessment and Plan   Diagnoses and all orders for this visit:    1. Dog bite of right upper extremity, subsequent encounter (Primary)  -     amoxicillin-clavulanate (AUGMENTIN) 875-125 MG per tablet; Take 1 tablet by mouth 2 (Two) Times a Day.  Dispense: 20 tablet; Refill: 0    2. Immunization due  -     Fluzone (or Fluarix & Flulaval for VFC) >6 Mos ([id number])             Follow Up   No follow-ups on file.  Patient was given instructions and " counseling regarding her condition or for health maintenance advice. Please see specific information pulled into the AVS if appropriate.     She will try the augmentin again and I have sent it in to pharmacy.  SE discussed.      Answers submitted by the patient for this visit:  Other (Submitted on 10/27/2023)  Please describe your symptoms.: Pain in my arm from a dog bite.  Have you had these symptoms before?: No  How long have you been having these symptoms?: Greater than 2 weeks  Please describe any probable cause for these symptoms. : Dog bite  Primary Reason for Visit (Submitted on 10/27/2023)  What is the primary reason for your visit?: Other

## 2023-11-02 ENCOUNTER — TELEPHONE (OUTPATIENT)
Dept: FAMILY MEDICINE CLINIC | Facility: CLINIC | Age: 53
End: 2023-11-02
Payer: COMMERCIAL

## 2023-11-02 DIAGNOSIS — S41.151D DOG BITE OF RIGHT UPPER EXTREMITY, SUBSEQUENT ENCOUNTER: Primary | ICD-10-CM
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
## 2023-11-02 RX ORDER — SULFAMETHOXAZOLE AND TRIMETHOPRIM 800; 160 MG/1; MG/1
1 TABLET ORAL 2 TIMES DAILY
Qty: 14 TABLET | Refills: 0 | Status: SHIPPED | OUTPATIENT
Start: 2023-11-02 | End: 2023-11-06

## 2023-11-02 RX ORDER — METRONIDAZOLE 500 MG/1
500 TABLET ORAL 3 TIMES DAILY
Qty: 21 TABLET | Refills: 0 | Status: SHIPPED | OUTPATIENT
Start: 2023-11-02 | End: 2023-11-06

## 2023-11-02 NOTE — TELEPHONE ENCOUNTER
Patient called and states she was seen on 10/27 and was given an antibiotic for a dog bite..  She said since starting the antibiotic, she has had diaherra and is now bleeding as well with the diaherra.  She says she was advised to take an probiotic and she does every time she takes an antibiotic.  She would like advise as to what to do please.  Her number is [phone].
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
## 2023-11-06 ENCOUNTER — TELEMEDICINE (OUTPATIENT)
Dept: BEHAVIORAL HEALTH | Facility: CLINIC | Age: 53
End: 2023-11-06
Payer: COMMERCIAL

## 2023-11-06 ENCOUNTER — TELEMEDICINE (OUTPATIENT)
Dept: FAMILY MEDICINE CLINIC | Facility: CLINIC | Age: 53
End: 2023-11-06
Payer: COMMERCIAL

## 2023-11-06 ENCOUNTER — TELEPHONE (OUTPATIENT)
Dept: FAMILY MEDICINE CLINIC | Facility: CLINIC | Age: 53
End: 2023-11-06

## 2023-11-06 DIAGNOSIS — F51.05 INSOMNIA DUE TO OTHER MENTAL DISORDER: ICD-10-CM

## 2023-11-06 DIAGNOSIS — T14.8XXA HEMATOMA: Primary | ICD-10-CM

## 2023-11-06 DIAGNOSIS — F32.1 CURRENT MODERATE EPISODE OF MAJOR DEPRESSIVE DISORDER WITHOUT PRIOR EPISODE: ICD-10-CM

## 2023-11-06 DIAGNOSIS — F99 INSOMNIA DUE TO OTHER MENTAL DISORDER: ICD-10-CM

## 2023-11-06 DIAGNOSIS — F41.1 GENERALIZED ANXIETY DISORDER: ICD-10-CM

## 2023-11-06 DIAGNOSIS — F13.20 BENZODIAZEPINE DEPENDENCE: Primary | ICD-10-CM

## 2023-11-06 DIAGNOSIS — Z79.899 HIGH RISK MEDICATION USE: ICD-10-CM

## 2023-11-06 PROCEDURE — 90833 PSYTX W PT W E/M 30 MIN: CPT

## 2023-11-06 PROCEDURE — 99214 OFFICE O/P EST MOD 30 MIN: CPT

## 2023-11-06 RX ORDER — ZOLPIDEM TARTRATE 10 MG/1
10 TABLET ORAL NIGHTLY PRN
Qty: 30 TABLET | Refills: 2 | Status: SHIPPED | OUTPATIENT
Start: 2023-11-13

## 2023-11-06 NOTE — PROGRESS NOTES
C/o dog bite    Subjective   Media ZINA Lopez is a 53 y.o. female.     History of Present Illness   Video visit due to patient unable come the office due to transportation.  Consent obtained.  Patient is at home.  Provider is at the office.  7-minute visit.  Patient complains of dog bite that occurred September 25 or so at her house.  Occurred on her right upper arm.  It was neighbors [place].  Per patient vaccinations for the dog are up-to-date.  She was treated with Augmentin partially but had to stop due to intolerance.  She states she still has a area with a dog bite is that is described as a lump.  It is better than it was.  It is not as tender but if she hits it it occasionally causes pain.    The following portions of the patient's history were reviewed and updated as appropriate: allergies, current medications, past family history, past medical history, past social history, past surgical history and problem list.    Review of Systems   Constitutional:  Negative for appetite change and fatigue.   HENT:  Negative for nosebleeds and sore throat.    Eyes:  Negative for blurred vision and visual disturbance.   Respiratory:  Negative for shortness of breath and wheezing.    Cardiovascular:  Negative for chest pain and leg swelling.   Gastrointestinal:  Negative for abdominal distention and abdominal pain.   Endocrine: Negative for cold intolerance and polyuria.   Genitourinary:  Negative for dysuria and hematuria.   Musculoskeletal:  Negative for arthralgias and myalgias.   Skin:  Negative for color change and rash.   Neurological:  Negative for weakness and confusion.   Psychiatric/Behavioral:  Negative for agitation and depressed mood.        Patient Active Problem List   Diagnosis    Back pain    Anxiety and depression    Goiter    Fatigue    Fibromyalgia    Herpes zoster    Insomnia    Iron deficiency    Myalgia    Vitamin B12 deficiency    Vitamin D deficiency    History of MRSA infection    Large bowel  obstruction    Seasonal allergies    GERD without esophagitis    Hypertriglyceridemia    Generalized anxiety disorder    Malabsorption    Gastroenteritis    High risk medication use    Upper respiratory tract infection due to COVID-19 virus    Encounter for annual health examination    Encounter for hepatitis C screening test for low risk patient    Perforation of right tympanic membrane    Upper respiratory infection, acute    Current episode of major depressive disorder without prior episode    Hot flashes due to menopause    Greater trochanteric bursitis of left hip    Immunization deficiency    Benzodiazepine dependence    Dog bite of right upper extremity    Hematoma       No Known Allergies      Current Outpatient Medications:     Bioflavonoid Products (VITAMIN C) chewable tablet, Chew 240 mg 2 (Two) Times a Day., Disp: , Rfl:     Cholecalciferol (VITAMIN D3) 5000 UNITS capsule capsule, Take 1 capsule by mouth Daily., Disp: , Rfl:     cloNIDine (Catapres) 0.1 MG tablet, Take 1 tablet by mouth Every Night. MONITOR FOR S/S OF LOW BP, REPORT TO PROVIDER IF THEY OCCUR, Disp: 30 tablet, Rfl: 1    diclofenac (VOLTAREN) 1 % gel gel, Place 4 g on the skin as directed by provider 4 (Four) Times a Day., Disp: , Rfl:     estradiol (MINIVELLE, VIVELLE-DOT) 0.05 MG/24HR patch, APPLY ONE PATCH TO SKIN TWO TIMES A WEEK, Disp: , Rfl:     omeprazole OTC (PriLOSEC OTC) 20 MG EC tablet, Take 1 tablet by mouth Daily., Disp: , Rfl:     Progesterone (PROMETRIUM) 100 MG capsule, Take 1 capsule by mouth Every Night., Disp: , Rfl:     valACYclovir (Valtrex) 1000 MG tablet, Take 1 tablet by mouth 3 (Three) Times a Day. Take for 7 days if outbreak, Disp: 21 tablet, Rfl: 5    venlafaxine XR (Effexor XR) 150 MG 24 hr capsule, Take 1 capsule by mouth Daily., Disp: 30 capsule, Rfl: 1    vitamin B-12 (CYANOCOBALAMIN) 1000 MCG tablet, Take 1 tablet by mouth Daily., Disp: 90 tablet, Rfl: 2    [START ON 11/13/2023] zolpidem (Ambien) 10 MG  tablet, Take 1 tablet by mouth At Night As Needed for Sleep., Disp: 30 tablet, Rfl: 2    Past Medical History:   Diagnosis Date    Acute upper respiratory infection     Anemia     Anxiety     Arthritis     Benzodiazepine dependence 8/25/2023    Chronic pain disorder     Depression     External hemorrhoid, thrombosed     Fibromyalgia     Folliculitis     ARCENIO (generalized anxiety disorder)     Gastroparesis     GERD (gastroesophageal reflux disease)     GERD without esophagitis     Heartburn     Hemorrhoids     High risk medication use     History of staph infection     LAST TIME 4 YEARS AGO INSIDE RIGHT NARIS    Hypertriglyceridemia     Immunization deficiency     Infected prosthetic mesh of abdominal wall     Insomnia     MRSA carrier     Persistent insomnia     Pica     PONV (postoperative nausea and vomiting)     Shingles     Strain of rectus abdominis muscle     Vitamin B12 deficiency     Volvulus of intestine        Past Surgical History:   Procedure Laterality Date    ABDOMINOPLASTY      BACK SURGERY  09/2015    [facility]    BARIATRIC SURGERY      BILATERAL BREAST REDUCTION Bilateral 10/2021    CHOLECYSTECTOMY      COLECTOMY PARTIAL / TOTAL Right     Right Hemicolectomy    COLONOSCOPY N/A 07/12/2016    Procedure: COLONOSCOPY into small bowel;  Surgeon: Riley Narayan MD;  Location:  [facility];  Service:     COLONOSCOPY N/A 04/11/2023    Procedure: COLONOSCOPY to anastamosis;  Surgeon: Riley Narayan MD;  Location:  [facility];  Service: General;  Laterality: N/A;  normal    COSMETIC SURGERY  10/2021    EXPLORATORY LAPAROTOMY N/A 04/20/2016    Procedure: LAPAROTOMY EXPLORATORY, POSSIBLE BOWEL RESECTION, POSSIBLE OSTOMY;  Surgeon: Riley Narayan MD;  [facility];  Service:     EXPLORATORY LAPAROTOMY N/A 12/02/2016    Procedure: exploratory laparotomy, removal of  infected abdominal wall mesh. ;  Surgeon: Riley  Martinez Narayan MD;  Location[facility] OR;  Service:     GASTRIC BYPASS      HIP ARTHROSCOPY W/ LABRAL REPAIR Left     LAPAROSCOPIC TUBAL LIGATION      OTHER SURGICAL HISTORY      LASER ON BACK    UMBILICAL HERNIA REPAIR         Family History   Problem Relation Age of Onset    Heart attack Father     Diabetes Father     Stroke Father     Pancreatitis Father     Diabetes Sister     Breast cancer Maternal Grandmother     Diabetes Paternal Grandmother     Osteoporosis Paternal Grandmother     Diabetes Paternal Grandfather     Arthritis Mother     No Known Problems Other        Social History     Tobacco Use    Smoking status: Former     Packs/day: 0.25     Years: 5.00     Additional pack years: 0.00     Total pack years: 1.25     Types: Cigarettes     Quit date:      Years since quittin.8     Passive exposure: Past    Smokeless tobacco: Never    Tobacco comments:     smoked less than 1 pack per day for 10 year   Substance Use Topics    Alcohol use: Yes     Comment: once every couple of months-7 or less drinks per week            Objective     There were no vitals filed for this visit.  There is no height or weight on file to calculate BMI.    Physical Exam  Constitutional:       Appearance: She is well-developed.   HENT:      Head: Normocephalic and atraumatic.   Pulmonary:      Breath sounds: Normal breath sounds.   Skin:     Comments: Right upper arm with subcutaneous swelling/nodularity noted.  Appeared to be approximately 4 cm in diameter.  No erythema noted.   Neurological:      Mental Status: She is alert and oriented to person, place, and time.   Psychiatric:         Behavior: Behavior normal.         Thought Content: Thought content normal.         Judgment: Judgment normal.         Lab Results   Component Value Date    GLUCOSE 110 (H) 2022    BUN 11 2022    CREATININE 0.67 2022    EGFRIFNONA 74 2020    EGFRIFAFRI 90 2020    BCR 16 2022    K 4.6 2022     CO2 21 05/02/2022    CALCIUM 9.0 05/02/2022    PROTENTOTREF 6.8 05/02/2022    ALBUMIN 4.4 05/02/2022    LABIL2 1.8 05/02/2022    AST 13 05/02/2022    ALT 15 05/02/2022       WBC   Date Value Ref Range Status   05/02/2022 6.7 3.4 - 10.8 x10E3/uL Final     RBC   Date Value Ref Range Status   05/02/2022 4.22 3.77 - 5.28 x10E6/uL Final     Hemoglobin   Date Value Ref Range Status   05/02/2022 12.5 11.1 - 15.9 g/dL Final   12/03/2016 9.1 (L) 11.9 - 15.5 g/dL Final     Hematocrit   Date Value Ref Range Status   05/02/2022 39.2 34.0 - 46.6 % Final   12/03/2016 30.2 (L) 35.6 - 45.5 % Final     MCV   Date Value Ref Range Status   05/02/2022 93 79 - 97 fL Final   12/03/2016 88.0 80.5 - 98.2 fL Final     MCH   Date Value Ref Range Status   05/02/2022 29.6 26.6 - 33.0 pg Final   12/03/2016 26.5 (L) 26.9 - 32.0 pg Final     MCHC   Date Value Ref Range Status   05/02/2022 31.9 31.5 - 35.7 g/dL Final   12/03/2016 30.1 (L) 32.4 - 36.3 g/dL Final     RDW   Date Value Ref Range Status   05/02/2022 12.6 11.7 - 15.4 % Final   12/03/2016 14.4 (H) 11.7 - 13.0 % Final     RDW-SD   Date Value Ref Range Status   12/03/2016 46.7 37.0 - 54.0 fl Final     MPV   Date Value Ref Range Status   12/03/2016 10.2 6.0 - 12.0 fL Final     Platelets   Date Value Ref Range Status   05/02/2022 267 150 - 450 x10E3/uL Final   12/03/2016 277 140 - 500 10*3/mm3 Final     Neutrophil Rel %   Date Value Ref Range Status   05/02/2022 63 Not Estab. % Final     Neutrophil %   Date Value Ref Range Status   12/03/2016 83.8 (H) 42.7 - 76.0 % Final     Lymphocyte Rel %   Date Value Ref Range Status   05/02/2022 25 Not Estab. % Final     Lymphocyte %   Date Value Ref Range Status   12/03/2016 7.9 (L) 19.6 - 45.3 % Final     Monocyte Rel %   Date Value Ref Range Status   05/02/2022 6 Not Estab. % Final     Monocyte %   Date Value Ref Range Status   12/03/2016 5.7 5.0 - 12.0 % Final     Eosinophil Rel %   Date Value Ref Range Status   05/02/2022 5 Not Estab. % Final  "    Eosinophil %   Date Value Ref Range Status   12/03/2016 2.3 0.3 - 6.2 % Final     Basophil Rel %   Date Value Ref Range Status   05/02/2022 1 Not Estab. % Final     Basophil %   Date Value Ref Range Status   12/03/2016 0.1 0.0 - 1.5 % Final     Immature Grans %   Date Value Ref Range Status   12/03/2016 0.2 0.0 - 0.5 % Final     Neutrophils Absolute   Date Value Ref Range Status   05/02/2022 4.3 1.4 - 7.0 x10E3/uL Final     Neutrophils, Absolute   Date Value Ref Range Status   12/03/2016 10.19 (H) 1.90 - 8.10 10*3/mm3 Final     Lymphocytes Absolute   Date Value Ref Range Status   05/02/2022 1.7 0.7 - 3.1 x10E3/uL Final     Lymphocytes, Absolute   Date Value Ref Range Status   12/03/2016 0.96 0.90 - 4.80 10*3/mm3 Final     Monocytes Absolute   Date Value Ref Range Status   05/02/2022 0.4 0.1 - 0.9 x10E3/uL Final     Monocytes, Absolute   Date Value Ref Range Status   12/03/2016 0.69 0.20 - 1.20 10*3/mm3 Final     Eosinophils Absolute   Date Value Ref Range Status   05/02/2022 0.3 0.0 - 0.4 x10E3/uL Final     Eosinophils, Absolute   Date Value Ref Range Status   12/03/2016 0.28 0.00 - 0.70 10*3/mm3 Final     Basophils Absolute   Date Value Ref Range Status   05/02/2022 0.1 0.0 - 0.2 x10E3/uL Final     Basophils, Absolute   Date Value Ref Range Status   12/03/2016 0.01 0.00 - 0.20 10*3/mm3 Final     Immature Grans, Absolute   Date Value Ref Range Status   12/03/2016 0.03 0.00 - 0.03 10*3/mm3 Final     nRBC   Date Value Ref Range Status   08/13/2020 0.0 0.0 - 0.2 /100 WBC Final       No results found for: \"HGBA1C\"    Lab Results   Component Value Date    BUQCHLUE60 626 05/02/2022       TSH   Date Value Ref Range Status   08/13/2020 1.660 0.270 - 4.200 uIU/mL Final       No results found for: \"CHOL\"  Lab Results   Component Value Date    TRIG 157 (H) 05/02/2022     Lab Results   Component Value Date    HDL 58 05/02/2022     Lab Results   Component Value Date    LDL 98 05/02/2022     Lab Results   Component Value Date " "   VLDL 27 05/02/2022     No results found for: \"LDLHDL\"      Procedures    Assessment & Plan   Problems Addressed this Visit       Hematoma - Primary     Diagnoses         Codes Comments    Hematoma    -  Primary ICD-10-CM: T14.8XXA  ICD-9-CM: 924.9         Hematoma, traumatic.  No sign of infection noted.  Informed patient may take 4 to 6 months for resolution.  Apply heat 3 times a day for 15 minutes and massage afterward.    No orders of the defined types were placed in this encounter.      Current Outpatient Medications   Medication Sig Dispense Refill    Bioflavonoid Products (VITAMIN C) chewable tablet Chew 240 mg 2 (Two) Times a Day.      Cholecalciferol (VITAMIN D3) 5000 UNITS capsule capsule Take 1 capsule by mouth Daily.      cloNIDine (Catapres) 0.1 MG tablet Take 1 tablet by mouth Every Night. MONITOR FOR S/S OF LOW BP, REPORT TO PROVIDER IF THEY OCCUR 30 tablet 1    diclofenac (VOLTAREN) 1 % gel gel Place 4 g on the skin as directed by provider 4 (Four) Times a Day.      estradiol (MINIVELLE, VIVELLE-DOT) 0.05 MG/24HR patch APPLY ONE PATCH TO SKIN TWO TIMES A WEEK      omeprazole OTC (PriLOSEC OTC) 20 MG EC tablet Take 1 tablet by mouth Daily.      Progesterone (PROMETRIUM) 100 MG capsule Take 1 capsule by mouth Every Night.      valACYclovir (Valtrex) 1000 MG tablet Take 1 tablet by mouth 3 (Three) Times a Day. Take for 7 days if outbreak 21 tablet 5    venlafaxine XR (Effexor XR) 150 MG 24 hr capsule Take 1 capsule by mouth Daily. 30 capsule 1    vitamin B-12 (CYANOCOBALAMIN) 1000 MCG tablet Take 1 tablet by mouth Daily. 90 tablet 2    [START ON 11/13/2023] zolpidem (Ambien) 10 MG tablet Take 1 tablet by mouth At Night As Needed for Sleep. 30 tablet 2     No current facility-administered medications for this visit.       Juana Lopez had no medications administered during this visit.    No follow-ups on file.    There are no Patient Instructions on file for this visit.       "

## 2023-11-06 NOTE — PATIENT INSTRUCTIONS
GENERAL NEW PATIENT INSTRUCTIONS:  -The best way to get a hold of Ricky is to call the office at [phone] and ask to leave a message with his medical assistant.  Patient's are not able to message their mental health provider directly via [employer], please give my office up to 48 hours to respond to a patient call/question/refill request.  -Please call 911 or go to the nearest ER if you begin to have thoughts of harming yourself or other people.  -Refill requests will be made during normal office hours, Monday-Friday 8:00-5:30.  Ricky is out of the office on Wednesdays and weekends.  Follow-up appointments must be maintained in order for prescribing to continue.    -Tuesdays and Thursdays are Ricky's in-office days, Mondays and Fridays are his telehealth days.  The decision to be seen virtually or in person is up to the discretion of the provider, not all behavioral health problems are appropriate for telehealth.    NO SHOW POLICY:  We understand unexpected circumstances arise; however, anytime you miss your appointment we are unable to provide you appropriate care.  In addition, each appointment missed could have been used to provide care for others.  We ask that you call at least 24 hours in advance to cancel or reschedule an appointment. We would like to take this opportunity to remind you of our policy stating patients who miss THREE or more appointments without cancelling or rescheduling 24 hours in advance of the appointment may be subject to cancellation of any further visits with our clinic and recommendation to seek in-person services/visits. Please call [phone] to reschedule your appointment. If there are reasons that make it difficult for you to keep the appointments, please call and let us know how we can help. Please understand that medication prescribing will not continue without seeing your provider.       [facility] [address] [place] [address]  P: [phone]  F:  [phone]

## 2023-11-06 NOTE — TELEPHONE ENCOUNTER
Caller: Juana Lopez    Relationship to patient: Self    Best call back number: [phone]    Patient is needing: PATIENT STATES THAT SHE WAS SEEN 2 WEEKS AGO FOR A DOG BITE, SHE STATES THAT THE ANTIBIOTICS SHE WAS GIVEN SHE WAS UNABLE TO TAKE DUE TO THEM HURTING HER STOMACH, AND THAT THE KNOT IS STILL THERE AND THAT DR. GARCIA HAD MENTIONED GETTING AN ULTRASOUND DONE TO MAKE SURE THAT IT WAS INFECTION AND NOT BLOOD BUILD UP. PATIENT STATES THAT SHE WOULD LIKE TO GO AHEAD AND GET THE ULTRA SOUND DONE TO SEE IF THAT IS WHAT IT IS. PLEASE ADVISE.

## 2023-12-04 ENCOUNTER — TELEMEDICINE (OUTPATIENT)
Dept: BEHAVIORAL HEALTH | Facility: CLINIC | Age: 53
End: 2023-12-04
Payer: COMMERCIAL

## 2023-12-04 DIAGNOSIS — F41.1 GENERALIZED ANXIETY DISORDER: ICD-10-CM

## 2023-12-04 DIAGNOSIS — F32.1 CURRENT MODERATE EPISODE OF MAJOR DEPRESSIVE DISORDER WITHOUT PRIOR EPISODE: ICD-10-CM

## 2023-12-04 DIAGNOSIS — F99 INSOMNIA DUE TO OTHER MENTAL DISORDER: Primary | ICD-10-CM

## 2023-12-04 DIAGNOSIS — F13.20 BENZODIAZEPINE DEPENDENCE: ICD-10-CM

## 2023-12-04 DIAGNOSIS — F51.05 INSOMNIA DUE TO OTHER MENTAL DISORDER: Primary | ICD-10-CM

## 2023-12-04 DIAGNOSIS — Z79.899 HIGH RISK MEDICATION USE: ICD-10-CM

## 2023-12-04 PROCEDURE — 99214 OFFICE O/P EST MOD 30 MIN: CPT

## 2023-12-04 RX ORDER — VENLAFAXINE HYDROCHLORIDE 150 MG/1
150 CAPSULE, EXTENDED RELEASE ORAL DAILY
Qty: 90 CAPSULE | Refills: 0 | Status: SHIPPED | OUTPATIENT
Start: 2023-12-04

## 2023-12-04 RX ORDER — CLONIDINE HYDROCHLORIDE 0.1 MG/1
0.1 TABLET ORAL NIGHTLY
Qty: 90 TABLET | Refills: 0 | Status: SHIPPED | OUTPATIENT
Start: 2023-12-04

## 2023-12-04 RX ORDER — CLONIDINE HYDROCHLORIDE 0.1 MG/1
0.1 TABLET ORAL NIGHTLY
Qty: 7 TABLET | Refills: 0 | Status: SHIPPED | OUTPATIENT
Start: 2023-12-04

## 2023-12-04 NOTE — PATIENT INSTRUCTIONS
GENERAL NEW PATIENT INSTRUCTIONS:  -The best way to get a hold of Ricky is to call the office at [phone] and ask to leave a message with his medical assistant.  Patient's are not able to message their mental health provider directly via [email or web address], please give my office up to 48 hours to respond to a patient call/question/refill request.  -Please call 911 or go to the nearest ER if you begin to have thoughts of harming yourself or other people.  -Refill requests will be made during normal office hours, Monday-Friday 8:00-5:30.  Ricky is out of the office on Wednesdays and weekends.  Follow-up appointments must be maintained in order for prescribing to continue.    -Tuesdays and Thursdays are Ricky's in-office days, Mondays and Fridays are his telehealth days.  The decision to be seen virtually or in person is up to the discretion of the provider, not all behavioral health problems are appropriate for telehealth.    NO SHOW POLICY:  We understand unexpected circumstances arise; however, anytime you miss your appointment we are unable to provide you appropriate care.  In addition, each appointment missed could have been used to provide care for others.  We ask that you call at least 24 hours in advance to cancel or reschedule an appointment. We would like to take this opportunity to remind you of our policy stating patients who miss THREE or more appointments without cancelling or rescheduling 24 hours in advance of the appointment may be subject to cancellation of any further visits with our clinic and recommendation to seek in-person services/visits. Please call [phone] to reschedule your appointment. If there are reasons that make it difficult for you to keep the appointments, please call and let us know how we can help. Please understand that medication prescribing will not continue without seeing your provider.       [facility] [address] [place] [address]  P: [phone]  F:  [phone]

## 2023-12-04 NOTE — PROGRESS NOTES
Patient assessed today via MyChart through EPIC pt is at home in a secure environment and verbalizes privacy during interview. MIGUEL García is at home in a secure environment using a secure laptop.The patient's condition being diagnosed/treated is appropriate for telemedicine.The provider identified himself as well as his credentials.The patient, and/or patient's guardian, consent to be seen remotely, and when consent is given they understand that the consent allows for patient identifiable information to be sent to a third party as needed. They may refuse to be seen remotely at any time. The electronic data is encrypted and password protected, and the patient and/or guardian has been advised of the potential risks to privacy not withstanding such measures.    Subjective    PATIENT ID: Juana Lopez, :1970, MRN: [id number]    CC : Anxiety, Insomnia    HPI:   53 y.o. female pt presents to [facility] 2023, last seen roughly 1 month prior.  Overall patient reports things have been going good, oldest daughter has had some kidney stone issues and had to go to the emergency room that resulted in surgery, patient reports sleeping very well is using clonidine and Ambien, reports she went a few days with no clonidine and sleep worsen, prior dog bite is improving, is off antibiotics, no permanent damage per patient reports PCP believes it is a minor blood clot that should resolve on its own, patient still in the process of taking legal action against neighbors for dog attacking them.  Patient has not been able to get in to see therapist recently due to being busy with her daughter and holiday activities.    SUBSTANCE/SEXUAL HISTORY:  Social History     Socioeconomic History    Marital status: Single    Number of children: 2   Tobacco Use    Smoking status: Former     Packs/day: 0.25     Years: 5.00     Additional pack years: 0.00     Total pack years: 1.25     Types: Cigarettes      Quit date:      Years since quittin.9     Passive exposure: Past    Smokeless tobacco: Never    Tobacco comments:     smoked less than 1 pack per day for 10 year   Vaping Use    Vaping Use: Never used   Substance and Sexual Activity    Alcohol use: Yes     Comment: once every couple of months-7 or less drinks per week    Drug use: No    Sexual activity: Not Currently     Partners: Male     Birth control/protection: Tubal ligation      FAMILY HISTORY:  family history includes Arthritis in her mother; Breast cancer in her maternal grandmother; Diabetes in her father, paternal grandfather, paternal grandmother, and sister; Heart attack in her father; No Known Problems in an other family member; Osteoporosis in her paternal grandmother; Pancreatitis in her father; Stroke in her father.     PAST MEDICAL HISTORY   has a past medical history of Acute upper respiratory infection, Anemia, Anxiety, Arthritis, Benzodiazepine dependence (2023), Chronic pain disorder, Depression, External hemorrhoid, thrombosed, Fibromyalgia, Folliculitis, ARCENIO (generalized anxiety disorder), Gastroparesis, GERD (gastroesophageal reflux disease), GERD without esophagitis, Heartburn, Hemorrhoids, High risk medication use, History of staph infection, Hypertriglyceridemia, Immunization deficiency, Infected prosthetic mesh of abdominal wall, Insomnia, MRSA carrier, Persistent insomnia, Pica, PONV (postoperative nausea and vomiting), Shingles, Strain of rectus abdominis muscle, Vitamin B12 deficiency, and Volvulus of intestine.    She has no past medical history of ADHD (attention deficit hyperactivity disorder), Alcohol abuse, Alcoholism, Anorexia nervosa, Autism spectrum disorder, Bipolar disorder, Borderline personality disorder, Bulimia nervosa, Cancer, Disease of thyroid gland, Hard to intubate, Head injury, HIV disease, Liver disease, Malignant hyperthermia due to anesthesia, Obsessive-compulsive disorder, Oppositional defiant  disorder, Panic disorder, Peripheral neuropathy, Psychiatric illness, Psychosis, PTSD (post-traumatic stress disorder), Schizoaffective disorder, Seizures, Self-injurious behavior, Spinal headache, Suicide attempt, Violence, history of, Withdrawal symptoms, alcohol, or Withdrawal symptoms, drug or narcotic.     Review of Systems   Constitutional: Negative.  Negative for chills and fever.   Respiratory:  Negative for chest tightness and shortness of breath.    Cardiovascular:  Negative for chest pain.   Gastrointestinal:  Negative for nausea and vomiting.   Neurological:  Negative for dizziness and light-headedness.   Psychiatric/Behavioral:  Positive for stress. Negative for sleep disturbance and suicidal ideas.      Objective   Visit Vitals  LMP 07/12/2016      Wt Readings from Last 3 Encounters:   10/27/23 86.2 kg (190 lb)   10/16/23 86.2 kg (190 lb)   09/21/23 86.2 kg (190 lb)     Temp Readings from Last 3 Encounters:   10/27/23 97.6 °F (36.4 °C) (Tympanic)   06/27/23 98.7 °F (37.1 °C)   04/11/23 98.1 °F (36.7 °C) (Oral)     BP Readings from Last 3 Encounters:   10/27/23 122/78   10/16/23 145/89   09/21/23 120/70     Pulse Readings from Last 3 Encounters:   10/27/23 82   10/16/23 79   09/21/23 68      Physical Exam  Constitutional:       Appearance: Normal appearance.   HENT:      Head: Normocephalic.   Pulmonary:      Effort: Pulmonary effort is normal.   Skin:     General: Skin is dry.   Neurological:      Mental Status: She is alert and oriented to person, place, and time.     MENTAL STATUS EXAM   General Appearance:  Cleanly groomed and dressed  Eye Contact:  Good eye contact  Attitude:  Cooperative  Motor Activity:  Normal posture  Speech:  Normal rate, tone, volume  Mood and affect:  Normal, pleasant  Thought Process:  Goal-directed  Associations/ Thought Content:  No delusions  Hallucinations:  None  Suicidal Ideations:  Not present  Homicidal Ideation:  Not present  Sensorium:  Alert  Orientation:   Person, place, time and situation  Attention Span/ Concentration:  Good  Fund of Knowledge:  Appropriate for age and educational level  Intellectual Functioning:  Average range  Insight:  Fair  Judgement:  Fair  Reliability:  Fair    PHQ-9 Depression Screening  Little interest or pleasure in doing things? (P) 0-->not at all   Feeling down, depressed, or hopeless? (P) 0-->not at all   Trouble falling or staying asleep, or sleeping too much? (P) 1-->several days   Feeling tired or having little energy?     Poor appetite or overeating? (P) 0-->not at all   Feeling bad about yourself/you are a failure/have let yourself or your family down? (P) 0-->not at all   Trouble concentrating on things? (P) 0-->not at all   Psychomotor agitation/retardation (P) 0-->not at all   Thoughts about death/dying/suicide (P) 0-->not at all   PHQ-9 Total Score (P) 1   How difficult have these problems for you? (P) not difficult at all     GAD7 Documentation:  Feeling nervous, anxious or on edge (P) 1   Not being able to stop or control worrying (P) 0   Worrying too much about different things (P) 0   Trouble relaxing (P) 0   Being so restless that it is hard to sit still (P) 0   Becoming easily annoyed or irritable (P) 0   Feeling Afraid as if something awful might happen (P) 1   ARCENIO Total Score (P) 2   How difficult have these problems made it for you? (P) Not difficult at all     LABS:  No visits with results within 1 Month(s) from this visit.   Latest known visit with results is:   Office Visit on 09/21/2023   Component Date Value Ref Range Status    Report Summary 09/21/2023 FINAL   Final    Comment: ====================================================================  [facility] COMP DRUG ANALYSIS,[facility]  ====================================================================  Test                             Result       Flag       Units  Drug Present and Declared for Prescription Verification    7-aminoclonazepam              334           EXPECTED   ng/mg creat     7-aminoclonazepam is an expected metabolite of clonazepam. Source     of clonazepam is a scheduled prescription medication.    Doxepin                        PRESENT      EXPECTED    Desmethyldoxepin               PRESENT      EXPECTED     Desmethyldoxepin is an expected metabolite of doxepin.    Venlafaxine                    PRESENT      EXPECTED    Desmethylvenlafaxine           PRESENT      EXPECTED     Desmethylvenlafaxine is an expected metabolite of venlafaxine.  Drug Present not Declared for Prescription Verification    Acetaminophen                  PRESENT      UNEXPECTED    Diphenhydramine                PRESENT                                 UNEXPECTED  ====================================================================  Test                      Result    Flag   Units      Ref Range    Creatinine              59               mg/dL      >=20  ====================================================================  Declared Medications:   The flagging and interpretation on this report are based on the   following declared medications.  Unexpected results may arise from   inaccuracies in the declared medications.   **Note: The testing scope of this panel includes these medications:   Clonazepam (Klonopin)   Doxepin   Venlafaxine (Effexor)  ====================================================================  For clinical consultation, please call [phone].  ====================================================================        No Known Allergies     Current Outpatient Medications   Medication Instructions    Bioflavonoid Products (VITAMIN C) chewable tablet 240 mg, Oral, 2 Times Daily    cloNIDine (CATAPRES) 0.1 mg, Oral, Nightly, MONITOR FOR S/S OF LOW BP, REPORT TO PROVIDER IF THEY OCCUR    cloNIDine (CATAPRES) 0.1 mg, Oral, Nightly    diclofenac (VOLTAREN) 4 g, Transdermal, 4 Times Daily    estradiol (MINIVELLE, VIVELLE-DOT) 0.05 MG/24HR patch APPLY ONE PATCH TO  SKIN TWO TIMES A WEEK    hydrocortisone 2.5 % ointment     omeprazole OTC (PRILOSEC OTC) 20 mg, Oral, Daily    Progesterone (PROMETRIUM) 100 mg, Oral, Nightly    valACYclovir (VALTREX) 1,000 mg, Oral, 3 Times Daily, Take for 7 days if outbreak    venlafaxine XR (EFFEXOR XR) 150 mg, Oral, Daily    vitamin B-12 (CYANOCOBALAMIN) 1,000 mcg, Oral, Daily    vitamin D3 5,000 Units, Oral, Daily    zolpidem (AMBIEN) 10 mg, Oral, Nightly PRN      Assessment    ASSESSMENT/TREATMENT PLAN/INSTRUCTIONS/EDUCATION     (F51.05,  F99) Insomnia due to other mental disorder - Plan: cloNIDine (Catapres) 0.1 MG tablet, venlafaxine XR (Effexor XR) 150 MG 24 hr capsule, cloNIDine (Catapres) 0.1 MG tablet    (F13.20) Benzodiazepine dependence in early remission - Plan: cloNIDine (Catapres) 0.1 MG tablet, venlafaxine XR (Effexor XR) 150 MG 24 hr capsule, cloNIDine (Catapres) 0.1 MG tablet    (F41.1) Generalized anxiety disorder - Plan: cloNIDine (Catapres) 0.1 MG tablet, venlafaxine XR (Effexor XR) 150 MG 24 hr capsule, cloNIDine (Catapres) 0.1 MG tablet    (Z79.899) High risk medication use - Plan: cloNIDine (Catapres) 0.1 MG tablet    (F32.1) Current moderate episode of major depressive disorder without prior episode - Plan: cloNIDine (Catapres) 0.1 MG tablet, venlafaxine XR (Effexor XR) 150 MG 24 hr capsule     -Stable/improved with current treatment. Pt reports daily compliance with medications, denies side effects.   -Continue Effexor, clonidine, Ambien as previously ordered  -Continue with established therapist  -Follow-up in 3 months or sooner if needed    -Please call the office at [phone] with any worsening of symptoms or onset of intolerable side effects, please ask to leave a message with Ricky's medical assistant.  Please give my office up to 48 hours to respond to a patient call/question/refill request.  -Patient is agreeable to call 911 or go to the nearest ER should he/she/they have any thoughts of harm to self or  others.  -Patient has been educated on providers schedule, contact information, and patient/provider expectations.     FOLLOW UP: Return in about 3 months (around 3/4/2024) for Video visit, NO MED CHANGES.    ADDITIONAL MONITORING FOR CONTROLLED SUBSTANCE:  -[employer] Contract signed, renewed yearly: 9/23      -CARLOS ALBERTO OVALLE 3M minimum scanned into EMR: 12/23  -PDMP via EMR interface reviewed during f/u patricia'ts and med refill requests  -UDS: random     Medications Discontinued During This Encounter   Medication Reason    venlafaxine XR (Effexor XR) 150 MG 24 hr capsule Reorder    cloNIDine (Catapres) 0.1 MG tablet Reorder       New Medications Ordered This Visit   Medications    cloNIDine (Catapres) 0.1 MG tablet     Sig: Take 1 tablet by mouth Every Night. MONITOR FOR S/S OF LOW BP, REPORT TO PROVIDER IF THEY OCCUR     Dispense:  90 tablet     Refill:  0    venlafaxine XR (Effexor XR) 150 MG 24 hr capsule     Sig: Take 1 capsule by mouth Daily.     Dispense:  90 capsule     Refill:  0    cloNIDine (Catapres) 0.1 MG tablet     Sig: Take 1 tablet by mouth Every Night.     Dispense:  7 tablet     Refill:  0     No orders of the defined types were placed in this encounter.    -Barriers: high risk medication, multiple psychiatric comorbidities , and stress  -Strengths:  motivated  and strong support system    -Progress toward goal: At Goal  -Functional Status: Moderate impairment   -Prognosis: Guarded with Ongoing Treatment     SUMMARY/DISCUSSION:  Pt past social/medical/family history reviewed/updated. ROS conducted, medications/allergies, reviewed.  Most recent vitals/labs reviewed. Pt was given appropriate time to ask questions and concerns were addressed. A thorough discussion was had that included review of disease process, need for continued monitoring and additional treatment options including use of pharmacological and non-pharmacological approaches to care, decisions were made and agreed upon by patient and provider.  Discussed the risks, benefits, and potential side effects of the medications; patient ackowledged and verbally consented.     Part of this note may be an electronic transcription/translation of spoken language to printed text using the Dragon Dictation System. Some of the data in this electronic note has been brought forward from a previous encounter, any necessary changes have been made, it has been reviewed by this APRN, and it is accurate.    This document has been electronically signed by BELLA Farah December 4, 2023 15:51 EST

## 2024-01-03 ENCOUNTER — HOSPITAL ENCOUNTER (EMERGENCY)
Facility: HOSPITAL | Age: 54
Discharge: HOME | End: 2024-01-04
Attending: EMERGENCY MEDICINE
Payer: COMMERCIAL

## 2024-01-03 ENCOUNTER — APPOINTMENT (OUTPATIENT)
Dept: CT IMAGING | Facility: HOSPITAL | Age: 54
End: 2024-01-03
Payer: COMMERCIAL

## 2024-01-03 ENCOUNTER — APPOINTMENT (OUTPATIENT)
Dept: GENERAL RADIOLOGY | Facility: HOSPITAL | Age: 54
End: 2024-01-03
Payer: COMMERCIAL

## 2024-01-03 DIAGNOSIS — K52.9 GASTROENTERITIS: Primary | ICD-10-CM

## 2024-01-03 DIAGNOSIS — R07.9 CHEST PAIN, UNSPECIFIED TYPE: ICD-10-CM

## 2024-01-03 LAB
ALBUMIN SERPL-MCNC: 4.6 G/DL (ref 3.5–5.2)
ALBUMIN/GLOB SERPL: 1.9 G/DL
ALP SERPL-CCNC: 166 U/L (ref 39–117)
ALT SERPL W P-5'-P-CCNC: 209 U/L (ref 1–33)
ANION GAP SERPL CALCULATED.3IONS-SCNC: 12 MMOL/L (ref 5–15)
AST SERPL-CCNC: 289 U/L (ref 1–32)
BASOPHILS # BLD AUTO: 0.02 10*3/MM3 (ref 0–0.2)
BASOPHILS NFR BLD AUTO: 0.2 % (ref 0–1.5)
BILIRUB SERPL-MCNC: 0.4 MG/DL (ref 0–1.2)
BUN SERPL-MCNC: 24 MG/DL (ref 6–20)
BUN/CREAT SERPL: 30.8 (ref 7–25)
CALCIUM SPEC-SCNC: 8.6 MG/DL (ref 8.6–10.5)
CHLORIDE SERPL-SCNC: 105 MMOL/L (ref 98–107)
CO2 SERPL-SCNC: 20 MMOL/L (ref 22–29)
CREAT SERPL-MCNC: 0.78 MG/DL (ref 0.57–1)
D DIMER PPP FEU-MCNC: 0.61 MCGFEU/ML (ref 0–0.52)
DEPRECATED RDW RBC AUTO: 40.6 FL (ref 37–54)
EGFRCR SERPLBLD CKD-EPI 2021: 91.5 ML/MIN/1.73
EOSINOPHIL # BLD AUTO: 0.16 10*3/MM3 (ref 0–0.4)
EOSINOPHIL NFR BLD AUTO: 1.4 % (ref 0.3–6.2)
ERYTHROCYTE [DISTWIDTH] IN BLOOD BY AUTOMATED COUNT: 12 % (ref 12.3–15.4)
FLUAV RNA RESP QL NAA+PROBE: NOT DETECTED
FLUBV RNA RESP QL NAA+PROBE: NOT DETECTED
GLOBULIN UR ELPH-MCNC: 2.4 GM/DL
GLUCOSE SERPL-MCNC: 141 MG/DL (ref 65–99)
HCT VFR BLD AUTO: 42.3 % (ref 34–46.6)
HGB BLD-MCNC: 13.9 G/DL (ref 12–15.9)
HOLD SPECIMEN: NORMAL
HOLD SPECIMEN: NORMAL
IMM GRANULOCYTES # BLD AUTO: 0.03 10*3/MM3 (ref 0–0.05)
IMM GRANULOCYTES NFR BLD AUTO: 0.3 % (ref 0–0.5)
LYMPHOCYTES # BLD AUTO: 0.36 10*3/MM3 (ref 0.7–3.1)
LYMPHOCYTES NFR BLD AUTO: 3.2 % (ref 19.6–45.3)
MAGNESIUM SERPL-MCNC: 2.1 MG/DL (ref 1.6–2.6)
MCH RBC QN AUTO: 30.3 PG (ref 26.6–33)
MCHC RBC AUTO-ENTMCNC: 32.9 G/DL (ref 31.5–35.7)
MCV RBC AUTO: 92.2 FL (ref 79–97)
MONOCYTES # BLD AUTO: 0.34 10*3/MM3 (ref 0.1–0.9)
MONOCYTES NFR BLD AUTO: 3 % (ref 5–12)
NEUTROPHILS NFR BLD AUTO: 10.37 10*3/MM3 (ref 1.7–7)
NEUTROPHILS NFR BLD AUTO: 91.9 % (ref 42.7–76)
NRBC BLD AUTO-RTO: 0 /100 WBC (ref 0–0.2)
PLATELET # BLD AUTO: 304 10*3/MM3 (ref 140–450)
PMV BLD AUTO: 10.3 FL (ref 6–12)
POTASSIUM SERPL-SCNC: 4.1 MMOL/L (ref 3.5–5.2)
PROT SERPL-MCNC: 7 G/DL (ref 6–8.5)
RBC # BLD AUTO: 4.59 10*6/MM3 (ref 3.77–5.28)
RSV RNA NPH QL NAA+NON-PROBE: NOT DETECTED
SARS-COV-2 RNA RESP QL NAA+PROBE: NOT DETECTED
SODIUM SERPL-SCNC: 137 MMOL/L (ref 136–145)
TROPONIN T SERPL HS-MCNC: <6 NG/L
WBC NRBC COR # BLD AUTO: 11.28 10*3/MM3 (ref 3.4–10.8)
WHOLE BLOOD HOLD COAG: NORMAL
WHOLE BLOOD HOLD SPECIMEN: NORMAL

## 2024-01-03 PROCEDURE — 87637 SARSCOV2&INF A&B&RSV AMP PRB: CPT | Performed by: PHYSICIAN ASSISTANT

## 2024-01-03 PROCEDURE — 99285 EMERGENCY DEPT VISIT HI MDM: CPT

## 2024-01-03 PROCEDURE — 84484 ASSAY OF TROPONIN QUANT: CPT

## 2024-01-03 PROCEDURE — 93005 ELECTROCARDIOGRAM TRACING: CPT

## 2024-01-03 PROCEDURE — 80053 COMPREHEN METABOLIC PANEL: CPT

## 2024-01-03 PROCEDURE — 85025 COMPLETE CBC W/AUTO DIFF WBC: CPT

## 2024-01-03 PROCEDURE — 71045 X-RAY EXAM CHEST 1 VIEW: CPT

## 2024-01-03 PROCEDURE — 85379 FIBRIN DEGRADATION QUANT: CPT | Performed by: PHYSICIAN ASSISTANT

## 2024-01-03 PROCEDURE — 83735 ASSAY OF MAGNESIUM: CPT | Performed by: PHYSICIAN ASSISTANT

## 2024-01-03 RX ORDER — ASPIRIN 325 MG
325 TABLET ORAL ONCE
Status: COMPLETED | OUTPATIENT
Start: 2024-01-03 | End: 2024-01-03

## 2024-01-03 RX ORDER — SODIUM CHLORIDE 0.9 % (FLUSH) 0.9 %
10 SYRINGE (ML) INJECTION AS NEEDED
Status: DISCONTINUED | OUTPATIENT
Start: 2024-01-03 | End: 2024-01-04 | Stop reason: HOSPADM

## 2024-01-03 RX ADMIN — ASPIRIN 325 MG: 325 TABLET ORAL at 22:35

## 2024-01-04 ENCOUNTER — APPOINTMENT (OUTPATIENT)
Dept: CT IMAGING | Facility: HOSPITAL | Age: 54
End: 2024-01-04
Payer: COMMERCIAL

## 2024-01-04 VITALS
HEART RATE: 99 BPM | HEIGHT: 64 IN | DIASTOLIC BLOOD PRESSURE: 73 MMHG | TEMPERATURE: 99.4 F | OXYGEN SATURATION: 93 % | WEIGHT: 187 LBS | SYSTOLIC BLOOD PRESSURE: 134 MMHG | BODY MASS INDEX: 31.92 KG/M2 | RESPIRATION RATE: 18 BRPM

## 2024-01-04 LAB
GEN 5 2HR TROPONIN T REFLEX: 7 NG/L
QT INTERVAL: 310 MS
QTC INTERVAL: 431 MS
TROPONIN T DELTA: NORMAL

## 2024-01-04 PROCEDURE — 84484 ASSAY OF TROPONIN QUANT: CPT

## 2024-01-04 PROCEDURE — 25510000001 IOPAMIDOL PER 1 ML: Performed by: EMERGENCY MEDICINE

## 2024-01-04 PROCEDURE — 25010000002 PROCHLORPERAZINE 10 MG/2ML SOLUTION: Performed by: EMERGENCY MEDICINE

## 2024-01-04 PROCEDURE — 25810000003 SODIUM CHLORIDE 0.9 % SOLUTION: Performed by: EMERGENCY MEDICINE

## 2024-01-04 PROCEDURE — 96374 THER/PROPH/DIAG INJ IV PUSH: CPT

## 2024-01-04 PROCEDURE — 71275 CT ANGIOGRAPHY CHEST: CPT

## 2024-01-04 PROCEDURE — 74177 CT ABD & PELVIS W/CONTRAST: CPT

## 2024-01-04 PROCEDURE — 36415 COLL VENOUS BLD VENIPUNCTURE: CPT

## 2024-01-04 RX ORDER — LORAZEPAM 1 MG/1
0.5 TABLET ORAL ONCE
Status: COMPLETED | OUTPATIENT
Start: 2024-01-04 | End: 2024-01-04

## 2024-01-04 RX ORDER — PROMETHAZINE HYDROCHLORIDE 25 MG/1
25 TABLET ORAL EVERY 6 HOURS PRN
Qty: 12 TABLET | Refills: 0 | Status: SHIPPED | OUTPATIENT
Start: 2024-01-04

## 2024-01-04 RX ORDER — PROCHLORPERAZINE EDISYLATE 5 MG/ML
10 INJECTION INTRAMUSCULAR; INTRAVENOUS ONCE
Status: COMPLETED | OUTPATIENT
Start: 2024-01-04 | End: 2024-01-04

## 2024-01-04 RX ADMIN — PROCHLORPERAZINE EDISYLATE 10 MG: 5 INJECTION INTRAMUSCULAR; INTRAVENOUS at 00:50

## 2024-01-04 RX ADMIN — LORAZEPAM 0.5 MG: 1 TABLET ORAL at 02:34

## 2024-01-04 RX ADMIN — IOPAMIDOL 95 ML: 755 INJECTION, SOLUTION INTRAVENOUS at 00:39

## 2024-01-04 RX ADMIN — SODIUM CHLORIDE 1000 ML: 9 INJECTION, SOLUTION INTRAVENOUS at 00:50

## 2024-01-04 NOTE — ED PROVIDER NOTES
MD ATTESTATION NOTE    The JAVIER and I have discussed this patient's history, physical exam, and treatment plan.    I provided a substantive portion of the care of this patient. I personally performed the physical exam, in its entirety. The attached note describes my personal findings.      Juana Lopez is a 53 y.o. female who presents to the ED c/o midsternal chest pain that radiates to her back.  States she has had multiple very brief episodes today.  States that the episodes make her nauseated.  No shortness of breath no diaphoresis.  Patient has had some diarrhea today as well.  No significant abdominal pain.      On exam:  GENERAL: not distressed  HENT: nares patent  EYES: no scleral icterus  CV: regular rhythm, regular rate  RESPIRATORY: normal effort  ABDOMEN: soft  MUSCULOSKELETAL: no deformity  NEURO: alert, moves all extremities, follows commands  SKIN: warm, dry    Labs  No results found for this or any previous visit (from the past 24 hour(s)).      Radiology  No Radiology Exams Resulted Within Past 24 Hours    Medications given in the ED:  Medications   aspirin tablet 325 mg (325 mg Oral Given 1/3/24 2235)   prochlorperazine (COMPAZINE) injection 10 mg (10 mg Intravenous Given 1/4/24 0050)   sodium chloride 0.9 % bolus 1,000 mL (0 mL Intravenous Stopped 1/4/24 0408)   iopamidol (ISOVUE-370) 76 % injection 100 mL (95 mL Intravenous Given 1/4/24 0039)   LORazepam (ATIVAN) tablet 0.5 mg (0.5 mg Oral Given 1/4/24 0234)       Orders placed during this visit:  Orders Placed This Encounter   Procedures    COVID-19, FLU A/B, RSV PCR 1 HR TAT - Swab, Nasopharynx    XR Chest 1 View    CT Angiogram Chest Pulmonary Embolism    CT Abdomen Pelvis With Contrast    [place] Draw    Comprehensive Metabolic Panel    High Sensitivity Troponin T    CBC Auto Differential    D-dimer, Quantitative    Magnesium    High Sensitivity Troponin T 2Hr    Undress & Gown    Continuous Pulse Oximetry    ECG 12 Lead ED Triage Standing  Order; Chest Pain    CBC & Differential    Green Top (Gel)    Lavender Top    Gold Top - SST    Light Blue Top       Medical Decision Making:  ED Course as of 01/08/24 0639   Wed Jan 03, 2024 2316 WBC(!): 11.28 [DC]   2316 Hemoglobin: 13.9 [DC]   2316 D-Dimer, Quant(!): 0.61 [DC]   2316 ALT (SGPT)(!): 209 [DC]   2316 AST (SGOT)(!): 289 [DC]   2316 Alkaline Phosphatase(!): 166 [DC]   2316 Total Bilirubin: 0.4 [DC]   2345 XR Chest 1 View  Radiology study independently interpreted by me and my findings are no pneumothorax.   [DC]   2348 HS Troponin T: <6 [DC]   2349 COVID19: Not Detected [DC]   2349 Influenza A PCR: Not Detected [DC]   2349 Influenza B PCR: Not Detected [DC]   2349 RSV, PCR: Not Detected [DC]   u Jan 04, 2024   0345 Some colitis noted on CT imaging as patient has had diarrhea.  She does not have any significant abdominal tenderness at this time and we will avoid treating with antibiotics.  LFTs are elevated.  Patient denies any alcohol use or frequent Tylenol use.  She will follow-up with her primary care provider for reevaluation of this.  No PE on CT angiogram of the chest.  No cardiac arrhythmias appreciated on EKG or while the patient was in the ER.  Patient had 2 negative troponins.  Will have patient follow-up with primary care provider for further evaluation she is feeling well and ready to be discharged home. [DC]      ED Course User Index  [DC] Ivanna Wei PA             Diagnosis  Final diagnoses:   Gastroenteritis   Chest pain, unspecified type          Kun Dorsey MD  01/04/24 0514       Kun Dorsey MD  01/08/24 0639

## 2024-01-04 NOTE — ED TRIAGE NOTES
Intermittent midsternal chest pain episodes, lasting approx 90sec, radiating to her back, beginning approx 3pm today. Reports she has had approx 15+ episodes of this pain today. Pt reports she feels like it may be indigestion but is concerned for her heart.   Pt also reports she has had diarrhea today.

## 2024-01-04 NOTE — ED PROVIDER NOTES
EMERGENCY DEPARTMENT ENCOUNTER      PCP: Gamal Gamez MD  Patient Care Team:  Gamal Gamez MD as PCP - General (Family Medicine)   Independent Historians: Patient    HPI:  Chief Complaint: Chest pain  A complete HPI/ROS/PMH/PSH/SH/FH are unobtainable due to: None    Chronic or social conditions impacting patient care (social determinants of health): None    Context: Juana Lopez is a 53 y.o. female who presents to the ED c/o intermittent episodes of sharp midsternal chest pain that radiates around to her back occurring today.  She states she has had about 15 episodes lasting about 1 to 2 minutes each.  She feels nauseous when the episodes occur but denies any severe shortness of breath.  She has not had any cough, known fevers.  She has had diarrhea today.  History of previous bowel obstruction requiring surgery but states pain does not feel similar to this.  No recent surgeries, asymmetric leg swelling, history of DVT or PE, or recent travel.    Review of prior external notes and/or external test results outside of this encounter: Office visit with primary care Dr. Medina on 10/27/2023.  Patient was placed on Augmentin for dog bite of her right upper extremity.      PAST MEDICAL HISTORY  Active Ambulatory Problems     Diagnosis Date Noted    Back pain 01/27/2016    Anxiety and depression 01/27/2016    Goiter 01/27/2016    Fatigue 01/27/2016    Fibromyalgia 01/27/2016    Herpes zoster 01/27/2016    Insomnia 01/27/2016    Iron deficiency 01/27/2016    Myalgia 01/27/2016    Vitamin B12 deficiency 01/27/2016    Vitamin D deficiency 01/27/2016    History of MRSA infection 03/02/2016    Large bowel obstruction 04/19/2016    Seasonal allergies 02/16/2014    GERD without esophagitis 09/12/2019    Hypertriglyceridemia 09/12/2019    Generalized anxiety disorder 03/18/2020    Malabsorption 08/13/2020    Gastroenteritis 08/23/2021    High risk medication use 08/23/2021    Upper respiratory tract infection due to  COVID-19 virus 01/25/2022    Encounter for annual health examination 05/02/2022    Encounter for hepatitis C screening test for low risk patient 05/02/2022    Perforation of right tympanic membrane 05/02/2022    Upper respiratory infection, acute 06/28/2022    Current episode of major depressive disorder without prior episode 08/09/2022    Hot flashes due to menopause 08/09/2022    Greater trochanteric bursitis of left hip 10/05/2022    Immunization deficiency 10/05/2022    Benzodiazepine dependence 08/25/2023    Dog bite of right upper extremity 10/27/2023    Hematoma 11/06/2023     Resolved Ambulatory Problems     Diagnosis Date Noted    Malaise 01/27/2016    Infected hernioplasty mesh 12/02/2016    Nausea 12/05/2016     Past Medical History:   Diagnosis Date    Acute upper respiratory infection     Anemia     Anxiety     Arthritis     Chronic pain disorder     Depression     External hemorrhoid, thrombosed     Folliculitis     ARCENIO (generalized anxiety disorder)     Gastroparesis     GERD (gastroesophageal reflux disease)     Heartburn     Hemorrhoids     History of staph infection     Infected prosthetic mesh of abdominal wall     MRSA carrier     Persistent insomnia     Pica     PONV (postoperative nausea and vomiting)     Shingles     Strain of rectus abdominis muscle     Volvulus of intestine        The patient has started, but not completed, their COVID-19 vaccination series.    PAST SURGICAL HISTORY  Past Surgical History:   Procedure Laterality Date    ABDOMINOPLASTY      BACK SURGERY  09/2015    [facility] Surgery[facility]by [facility]    BARIATRIC SURGERY      BILATERAL BREAST REDUCTION Bilateral 10/2021    CHOLECYSTECTOMY      COLECTOMY PARTIAL / TOTAL Right     Right Hemicolectomy    COLONOSCOPY N/A 07/12/2016    Procedure: COLONOSCOPY into small bowel;  Surgeon: Riley Narayan MD;  Location: [facility] ENDOSCOPY;  Service:     COLONOSCOPY N/A 04/11/2023    Procedure: COLONOSCOPY to  anastamosis;  Surgeon: Riley Narayan MD;  Location: [facility] ENDOSCOPY;  Service: General;  Laterality: N/A;  normal    COSMETIC SURGERY  10/2021    EXPLORATORY LAPAROTOMY N/A 2016    Procedure: LAPAROTOMY EXPLORATORY, POSSIBLE BOWEL RESECTION, POSSIBLE OSTOMY;  Surgeon: Riley Narayan MD;  Location: [facility] MAIN OR;  Service:     EXPLORATORY LAPAROTOMY N/A 2016    Procedure: exploratory laparotomy, removal of  infected abdominal wall mesh. ;  Surgeon: Riley Narayan MD;  Location: [facility] MAIN OR;  Service:     GASTRIC BYPASS      HIP ARTHROSCOPY W/ LABRAL REPAIR Left     LAPAROSCOPIC TUBAL LIGATION      OTHER SURGICAL HISTORY      LASER ON BACK    UMBILICAL HERNIA REPAIR           FAMILY HISTORY  Family History   Problem Relation Age of Onset    Heart attack Father     Diabetes Father     Stroke Father     Pancreatitis Father     Diabetes Sister     Breast cancer Maternal Grandmother     Diabetes Paternal Grandmother     Osteoporosis Paternal Grandmother     Diabetes Paternal Grandfather     Arthritis Mother     No Known Problems Other          SOCIAL HISTORY  Social History     Socioeconomic History    Marital status: Single    Number of children: 2   Tobacco Use    Smoking status: Former     Packs/day: 0.25     Years: 5.00     Additional pack years: 0.00     Total pack years: 1.25     Types: Cigarettes     Quit date:      Years since quittin.0     Passive exposure: Past    Smokeless tobacco: Never    Tobacco comments:     smoked less than 1 pack per day for 10 year   Vaping Use    Vaping Use: Never used   Substance and Sexual Activity    Alcohol use: Yes     Comment: once every couple of months-7 or less drinks per week    Drug use: No    Sexual activity: Not Currently     Partners: Male     Birth control/protection: Tubal ligation         ALLERGIES  Patient has no known allergies.        REVIEW OF SYSTEMS  Review of Systems   Constitutional:  Negative for  fever.   Respiratory:  Negative for cough and shortness of breath.    Cardiovascular:  Positive for chest pain.   Gastrointestinal:  Positive for diarrhea and nausea. Negative for abdominal pain.   Neurological:  Negative for headaches.        All systems reviewed and negative except for those discussed in HPI.       PHYSICAL EXAM    I have reviewed the triage vital signs and nursing notes.    ED Triage Vitals   Temp Heart Rate Resp BP SpO2   01/03/24 2156 01/03/24 2156 01/03/24 2156 01/03/24 2202 01/03/24 2156   99.4 °F (37.4 °C) (!) 140 18 147/80 91 %      Temp src Heart Rate Source Patient Position BP Location FiO2 (%)   01/03/24 2156 01/03/24 2156 01/03/24 2202 01/03/24 2202 --   Tympanic Monitor Lying Left arm        Physical Exam  GENERAL: alert, no acute distress  SKIN: Warm, dry  HENT: Normocephalic, atraumatic  EYES: no scleral icterus  CV: regular rhythm, tachycardic rate  RESPIRATORY: normal effort, lungs clear  ABDOMEN: soft, nontender, nondistended  MUSCULOSKELETAL: no deformity, no pitting edema, no calf tenderness  NEURO: alert, moves all extremities, follows commands          LAB RESULTS  Labs Reviewed   COMPREHENSIVE METABOLIC PANEL - Abnormal; Notable for the following components:       Result Value    Glucose 141 (*)     BUN 24 (*)     CO2 20.0 (*)     ALT (SGPT) 209 (*)     AST (SGOT) 289 (*)     Alkaline Phosphatase 166 (*)     BUN/Creatinine Ratio 30.8 (*)     All other components within normal limits    Narrative:     GFR Normal >60  Chronic Kidney Disease <60  Kidney Failure <15     CBC WITH AUTO DIFFERENTIAL - Abnormal; Notable for the following components:    WBC 11.28 (*)     RDW 12.0 (*)     Neutrophil % 91.9 (*)     Lymphocyte % 3.2 (*)     Monocyte % 3.0 (*)     Neutrophils, Absolute 10.37 (*)     Lymphocytes, Absolute 0.36 (*)     All other components within normal limits   D-DIMER, QUANTITATIVE - Abnormal; Notable for the following components:    D-Dimer, Quantitative 0.61 (*)     All  "other components within normal limits    Narrative:     According to the assay 's published package insert, a normal (<0.50 MCGFEU/mL) D-dimer result in conjunction with a non-high clinical probability assessment, excludes deep vein thrombosis (DVT) and pulmonary embolism (PE) with high sensitivity.    D-dimer values increase with age and this can make VTE exclusion of an older population difficult. To address this, the [employer] College [employer], based on best available evidence and recent guidelines, recommends that clinicians use age-adjusted D-dimer thresholds in patients greater than 50 years of age with: a) a low probability of PE who do not meet all Pulmonary Embolism Rule Out Criteria, or b) in those with intermediate probability of PE.   The formula for an age-adjusted D-dimer cut-off is \"age/100\".  For example, a 60 year old patient would have an age-adjusted cut-off of 0.60 MCGFEU/mL and an 80 year old 0.80 MCGFEU/mL.   COVID-19/FLUA&B/RSV, NP SWAB IN TRANSPORT MEDIA 1 HR TAT - Normal    Narrative:     Fact sheet for providers: https://www.fda.gov/media/410498/download    Fact sheet for patients: https://www.fda.gov/media/730145/download    Test performed by PCR.   TROPONIN - Normal    Narrative:     High Sensitive Troponin T Reference Range:  <14.0 ng/L- Negative Female for AMI  <22.0 ng/L- Negative Male for AMI  >=14 - Abnormal Female indicating possible myocardial injury.  >=22 - Abnormal Male indicating possible myocardial injury.   Clinicians would have to utilize clinical acumen, EKG, Troponin, and serial changes to determine if it is an Acute Myocardial Infarction or myocardial injury due to an underlying chronic condition.        MAGNESIUM - Normal   RAINBOW DRAW    Narrative:     The following orders were created for panel order [place] Draw.  Procedure                               Abnormality         Status                     ---------                               -----------  "        ------                     Green Top (Gel)[[id number]]                                  Final result               Lavender Top[[id number]]                                     Final result               Gold Top - SST[[id number]]                                   Final result               Light Blue Top[[id number]]                                   Final result                 Please view results for these tests on the individual orders.   HIGH SENSITIVITIY TROPONIN T 2HR    Narrative:     High Sensitive Troponin T Reference Range:  <14.0 ng/L- Negative Female for AMI  <22.0 ng/L- Negative Male for AMI  >=14 - Abnormal Female indicating possible myocardial injury.  >=22 - Abnormal Male indicating possible myocardial injury.   Clinicians would have to utilize clinical acumen, EKG, Troponin, and serial changes to determine if it is an Acute Myocardial Infarction or myocardial injury due to an underlying chronic condition.        CBC AND DIFFERENTIAL    Narrative:     The following orders were created for panel order CBC & Differential.  Procedure                               Abnormality         Status                     ---------                               -----------         ------                     CBC Auto Differential[[id number]]        Abnormal            Final result                 Please view results for these tests on the individual orders.   GREEN TOP   LAVENDER TOP   GOLD TOP - SST   LIGHT BLUE TOP         Ordered the above labs and independently reviewed and interpreted the results.        RADIOLOGY  CT Angiogram Chest Pulmonary Embolism   Final Result   IMPRESSION:       No evidence of pulmonary emboli.     Plan concern for bronchitis which may be infectious or inflammatory   etiologies.  Prominent mediastinal hilar lymph nodes.  No consolidation   or pleural effusion.      Electronically signed by Lucia Colbert MD on 01-04-24 at 0116      CT Abdomen Pelvis With Contrast   Final Result    IMPRESSION:       Findings concerning for distal sigmoid colitis and proctitis, which made   with infectious or inflammatory etiologies.  Secondary mesenteric   adenitis.      Electronically signed by Lucia Colbert MD on 01-04-24 at 0327      XR Chest 1 View   Final Result   FINDINGS AND IMPRESSION:   No pulmonary consolidation, pleural effusion or pneumothorax is seen.   Cardiac silhouette is within normal limits for size.       This report was finalized on 1/3/2024 11:22 PM by Dr. Kalin Jennings M.D   on Workstation: [id number]              I ordered the above noted radiological studies. Independently reviewed and interpreted by me.  See dictation for official radiology interpretation.      PROCEDURES    Procedures      MEDICATIONS GIVEN IN ER    Medications   aspirin tablet 325 mg (325 mg Oral Given 1/3/24 2235)   prochlorperazine (COMPAZINE) injection 10 mg (10 mg Intravenous Given 1/4/24 0050)   sodium chloride 0.9 % bolus 1,000 mL (0 mL Intravenous Stopped 1/4/24 0408)   iopamidol (ISOVUE-370) 76 % injection 100 mL (95 mL Intravenous Given 1/4/24 0039)   LORazepam (ATIVAN) tablet 0.5 mg (0.5 mg Oral Given 1/4/24 0234)         PROGRESS, DATA ANALYSIS, CONSULTS, AND MEDICAL DECISION MAKING    All labs have been independently reviewed and interpreted by me.  All radiology studies have been independently reviewed and interpreted by me and discussed with radiologist dictating the report.   EKG's independently reviewed and interpreted by me.  Discussion below represents my analysis of pertinent findings related to patient's condition, differential diagnosis, treatment plan and final disposition.    My differential diagnosis for chest pain includes but is not limited to:  Acute coronary syndrome, stable angina, unstable angina, aortic dissection, cardiac tamponade, coronary artery dissection, esophageal perforation, pulmonary embolism, tension pneumothorax, cocaine-associated chest pain, mediastinitis,  cholecystitis, pancreatitis, myocarditis, pericarditis, aortic stenosis, asthma exacerbation, costochondritis, GERD, pneumonia, rib fracture, muscle strain, herpes zoster, pleurisy    My differential diagnosis for abdominal pain includes but is not limited to:    Gastritis, gastroenteritis, peptic ulcer disease, GERD, esophageal perforation, acute appendicitis, mesenteric adenitis, Meckel’s diverticulum, epiploic appendagitis, diverticulitis, colon cancer, ulcerative colitis, Crohn’s disease, intussusception, small bowel obstruction, adhesions, ischemic bowel, perforated viscus, ileus, obstipation, biliary colic, cholecystitis, cholelithiasis, Abhay-Eduardo Rinku, hepatitis, pancreatitis, common bile duct obstruction, cholangitis, bile leak, splenic trauma, splenic rupture, splenic infarction, splenic abscess, abdominal abscess, ascites, spontaneous bacterial peritonitis, hernia, UTI, cystitis, ureterolithiasis, urinary obstruction, ovarian cyst, torsion, pregnancy, ectopic pregnancy, PID, pelvic abscess, mittelschmerz, endometriosis, AAA, myocardial infarction, pneumonia, cancer, porphyria, DKA, medications, sickle cell, viral syndrome, herpes zoster      ED Course as of 01/05/24 0303   Wed Jan 03, 2024   2316 WBC(!): 11.28 [DC]   2316 Hemoglobin: 13.9 [DC]   2316 D-Dimer, Quant(!): 0.61 [DC]   2316 ALT (SGPT)(!): 209 [DC]   2316 AST (SGOT)(!): 289 [DC]   2316 Alkaline Phosphatase(!): 166 [DC]   2316 Total Bilirubin: 0.4 [DC]   2345 XR Chest 1 View  Radiology study independently interpreted by me and my findings are no pneumothorax.   [DC]   2348 HS Troponin T: <6 [DC]   2349 COVID19: Not Detected [DC]   2349 Influenza A PCR: Not Detected [DC]   2349 Influenza B PCR: Not Detected [DC]   2349 RSV, PCR: Not Detected [DC]   Thu Jan 04, 2024   0345 Some colitis noted on CT imaging as patient has had diarrhea.  She does not have any significant abdominal tenderness at this time and we will avoid treating with antibiotics.   LFTs are elevated.  Patient denies any alcohol use or frequent Tylenol use.  She will follow-up with her primary care provider for reevaluation of this.  No PE on CT angiogram of the chest.  No cardiac arrhythmias appreciated on EKG or while the patient was in the ER.  Patient had 2 negative troponins.  Will have patient follow-up with primary care provider for further evaluation she is feeling well and ready to be discharged home. [DC]      ED Course User Index  [DC] Ivanna Wei PA             AS OF 03:03 EST VITALS:    BP - 134/73  HR - 99  TEMP - 99.4 °F (37.4 °C) (Tympanic)  O2 SATS - 93%        DIAGNOSIS  Final diagnoses:   Gastroenteritis   Chest pain, unspecified type         DISPOSITION  ED Disposition       ED Disposition   Discharge    Condition   Stable    Comment   --                  Note Disclaimer: At [facility], we believe that sharing information builds trust and better relationships. You are receiving this note because you recently visited [facility]. It is possible you will see health information before a provider has talked with you about it. This kind of information can be easy to misunderstand. To help you fully understand what it means for your health, we urge you to discuss this note with your provider.         Ivanna Wei PA  01/05/24 0306

## 2024-01-15 ENCOUNTER — TELEPHONE (OUTPATIENT)
Dept: FAMILY MEDICINE CLINIC | Facility: CLINIC | Age: 54
End: 2024-01-15
Payer: COMMERCIAL

## 2024-01-15 DIAGNOSIS — F99 INSOMNIA DUE TO OTHER MENTAL DISORDER: ICD-10-CM

## 2024-01-15 DIAGNOSIS — F51.05 INSOMNIA DUE TO OTHER MENTAL DISORDER: ICD-10-CM

## 2024-01-15 DIAGNOSIS — F32.1 CURRENT MODERATE EPISODE OF MAJOR DEPRESSIVE DISORDER WITHOUT PRIOR EPISODE: ICD-10-CM

## 2024-01-15 DIAGNOSIS — F13.20 BENZODIAZEPINE DEPENDENCE: ICD-10-CM

## 2024-01-15 DIAGNOSIS — Z79.899 HIGH RISK MEDICATION USE: ICD-10-CM

## 2024-01-15 DIAGNOSIS — F41.1 GENERALIZED ANXIETY DISORDER: ICD-10-CM

## 2024-01-15 RX ORDER — CLONIDINE HYDROCHLORIDE 0.2 MG/1
0.2 TABLET ORAL NIGHTLY
Qty: 30 TABLET | Refills: 0 | Status: SHIPPED | OUTPATIENT
Start: 2024-01-15

## 2024-01-15 NOTE — TELEPHONE ENCOUNTER
I am okay with upping it as long as the patient monitors for signs and symptoms of low blood pressure that me and her have discussed in detail, I cannot remember if she checks her BP at home or not but the systolic or the top number should always be over 100, she can start taking 2 of the 0.1 mg at bedtime but I will send her in a 30-day supply of the 0.2 mg tablet.

## 2024-01-15 NOTE — TELEPHONE ENCOUNTER
Advised patient that Ricky Ok'd the increase in dose, sent in new prescription for 0.2mg tablet. Patient advised to monitor for low blood pressure, specifically if systolic number goes below 100. Patient verbalized an understanding.

## 2024-01-31 DIAGNOSIS — F32.1 CURRENT MODERATE EPISODE OF MAJOR DEPRESSIVE DISORDER WITHOUT PRIOR EPISODE: ICD-10-CM

## 2024-01-31 DIAGNOSIS — F51.05 INSOMNIA DUE TO OTHER MENTAL DISORDER: ICD-10-CM

## 2024-01-31 DIAGNOSIS — F99 INSOMNIA DUE TO OTHER MENTAL DISORDER: ICD-10-CM

## 2024-01-31 DIAGNOSIS — Z79.899 HIGH RISK MEDICATION USE: ICD-10-CM

## 2024-01-31 DIAGNOSIS — F41.1 GENERALIZED ANXIETY DISORDER: ICD-10-CM

## 2024-01-31 DIAGNOSIS — F13.20 BENZODIAZEPINE DEPENDENCE: ICD-10-CM

## 2024-01-31 RX ORDER — CLONIDINE HYDROCHLORIDE 0.2 MG/1
0.2 TABLET ORAL NIGHTLY
Qty: 90 TABLET | Refills: 0 | Status: SHIPPED | OUTPATIENT
Start: 2024-01-31

## 2024-01-31 RX ORDER — VENLAFAXINE HYDROCHLORIDE 150 MG/1
150 CAPSULE, EXTENDED RELEASE ORAL DAILY
Qty: 90 CAPSULE | Refills: 0 | Status: SHIPPED | OUTPATIENT
Start: 2024-01-31

## 2024-01-31 NOTE — TELEPHONE ENCOUNTER
"LAST REFILL - 01/15/24 Catapres, 12/04/23 Effexor, 11/13/23 Ambien  LAST VISIT - 12/04/23  NEXT VISIT - 02/26/24    Patient states she is not running low on meds but was notified by her pharmacy to let her prescriber know that there are zero refills available. I let her know that some medications may not be filled since they are not \"due,\" especially a controlled substance. Patient verbalized an understanding of this. Ambien not pended due to request to go to a different pharmacy.  "

## 2024-01-31 NOTE — TELEPHONE ENCOUNTER
Pt called and stated she [facility] She also needs a refill of Ambien sent to the [facility] listed in her chart. She will be out of town on 3/4/2024 on a cruise and needs to reschedule her next appt with Ricky. Please call [phone]

## 2024-02-10 DIAGNOSIS — F51.05 INSOMNIA DUE TO OTHER MENTAL DISORDER: ICD-10-CM

## 2024-02-10 DIAGNOSIS — F99 INSOMNIA DUE TO OTHER MENTAL DISORDER: ICD-10-CM

## 2024-02-10 DIAGNOSIS — F32.1 CURRENT MODERATE EPISODE OF MAJOR DEPRESSIVE DISORDER WITHOUT PRIOR EPISODE: ICD-10-CM

## 2024-02-10 DIAGNOSIS — F13.20 BENZODIAZEPINE DEPENDENCE: ICD-10-CM

## 2024-02-10 DIAGNOSIS — F41.1 GENERALIZED ANXIETY DISORDER: ICD-10-CM

## 2024-02-10 DIAGNOSIS — Z79.899 HIGH RISK MEDICATION USE: ICD-10-CM

## 2024-02-11 RX ORDER — CLONIDINE HYDROCHLORIDE 0.2 MG/1
TABLET ORAL
Qty: 30 TABLET | Refills: 2 | Status: SHIPPED | OUTPATIENT
Start: 2024-02-11

## 2024-02-12 DIAGNOSIS — F13.20 BENZODIAZEPINE DEPENDENCE: ICD-10-CM

## 2024-02-12 DIAGNOSIS — F99 INSOMNIA DUE TO OTHER MENTAL DISORDER: ICD-10-CM

## 2024-02-12 DIAGNOSIS — F41.1 GENERALIZED ANXIETY DISORDER: ICD-10-CM

## 2024-02-12 DIAGNOSIS — Z79.899 HIGH RISK MEDICATION USE: ICD-10-CM

## 2024-02-12 DIAGNOSIS — F51.05 INSOMNIA DUE TO OTHER MENTAL DISORDER: ICD-10-CM

## 2024-02-12 RX ORDER — ZOLPIDEM TARTRATE 10 MG/1
10 TABLET ORAL NIGHTLY PRN
Qty: 30 TABLET | Refills: 2 | Status: SHIPPED | OUTPATIENT
Start: 2024-02-12 | End: 2024-02-12 | Stop reason: SDUPTHER

## 2024-02-12 RX ORDER — ZOLPIDEM TARTRATE 10 MG/1
10 TABLET ORAL NIGHTLY PRN
Qty: 30 TABLET | Refills: 2 | Status: SHIPPED | OUTPATIENT
Start: 2024-02-12

## 2024-02-26 ENCOUNTER — TELEMEDICINE (OUTPATIENT)
Dept: BEHAVIORAL HEALTH | Facility: CLINIC | Age: 54
End: 2024-02-26
Payer: COMMERCIAL

## 2024-02-26 DIAGNOSIS — G47.00 INSOMNIA, UNSPECIFIED TYPE: Primary | ICD-10-CM

## 2024-02-26 DIAGNOSIS — F41.1 GENERALIZED ANXIETY DISORDER: ICD-10-CM

## 2024-02-26 DIAGNOSIS — R74.8 ELEVATED LIVER ENZYMES: ICD-10-CM

## 2024-02-26 DIAGNOSIS — Z79.899 HIGH RISK MEDICATION USE: ICD-10-CM

## 2024-02-26 PROCEDURE — 99214 OFFICE O/P EST MOD 30 MIN: CPT

## 2024-02-26 NOTE — PATIENT INSTRUCTIONS
GENERAL NEW PATIENT INSTRUCTIONS:    -Please arrive in person or virtually 10-15 minutes prior to appointment to allow for registration/sign in/questionnaires. If you are seen virtually please review after visit summary (AVS)/patient instructions via [employer] for a summary of plan of care/changes in treatment plan. If you are having difficulties logging on or accessing [employer] please contact my office for assistance.    -The best way to get a hold of Ricky is to call the office at [phone] and ask to leave a message with his medical assistant. Ricky Anaya is a Psychiatric Mental Health Nurse Practitioner, due to his specialty patient's are not able to message him directly via [employer]. Please give my office up to 48 hours to respond to a patient call/question/refill request. Refill requests will be made during normal office hours only, Monday-Friday 8:00-5:30.      -Ricky is out of the office on Wednesdays and weekends. Tuesdays and Thursdays are Ricky's in-office days, Mondays and Fridays are his telehealth days.  The decision to be seen virtually or in person is up to the discretion of the provider, not all behavioral health problems are appropriate for telehealth.    -Please call the office at [phone] with any worsening of symptoms or onset of intolerable side effects.  -Follow-up appointments must be maintained in order for prescribing to continue.  -Patient has been educated regarding multimodal approach with healthy nutrition, healthy sleep, regular physical activity, social activities, counseling, and medications.   -Please call 911 or go to the nearest ER if you begin to have thoughts of harming yourself or other people.    -Controlled Substance Instructions:  Patient has been educated on additional monitoring that is required including regular follow up appointments, CARLOS ALBERTO reports, random urine drug screens, and random pill counts.    Patient has read/reviewed controlled substance agreement and  understands this must be renewed yearly.  Patient educated that continuation of a controlled substance is at the discretion of the provider.     Patient educated that prescribing will not continue unless follow-up appointments are maintained.  Prescriptions can only be sent to a [place] pharmacy, 30 day supply with limited number of refills. Refill requests will only be made/reviewed during normal business hours.   Patient has been educated on the risk versus benefit of being prescribed a controlled substance including dependence/tolerance/abuse/addiction/diversion.  Patient has been educated on non-controlled substance alternatives that carry with them much less risk and require less monitoring.  If the patient is unwilling or unable to comply with the above listed monitoring the controlled substance will no longer be prescribed.

## 2024-02-26 NOTE — PROGRESS NOTES
Patient assessed today via MyChart through EPIC pt is at home in a secure environment and verbalizes privacy during interview. MIGUEL García is at home in a secure environment using a secure laptop.The patient's condition being diagnosed/treated is appropriate for telemedicine.The provider identified himself as well as his credentials.The patient, and/or patient's guardian, consent to be seen remotely, and when consent is given they understand that the consent allows for patient identifiable information to be sent to a third party as needed. They may refuse to be seen remotely at any time. The electronic data is encrypted and password protected, and the patient and/or guardian has been advised of the potential risks to privacy not withstanding such measures.    Subjective    PATIENT ID: Juana Lopez, :1970, MRN: [id number]    Chief Complaint   Patient presents with    Anxiety    Insomnia    Follow-up     HPI:   54 y.o. female pt presents to [facility] 2024, last seen roughly 3 months prior, overall patient reports things are good/about the same, reports sleeping overall well with combination of Ambien and clonidine, no symptoms of low BP reported, patient educated briefly on medication side effects, patient continues to report legal issues with neighbor over dog attack, working with a , patient reports therapist has recommended EMDR which I support, reports they will start this type of therapy [facility], will be flying there next week for a cruise X 8 days that she is looking forward to, patient reports in January having to go to the ER for nausea, diarrhea, chest pain, unclear etiology, elevated liver enzymes reported and reviewed with patient, patient denies alcohol/drug use, reports she does take Excedrin Migraine often and thinks Tylenol is contributing.  Patient denies new medications or new medical problems outside of  this.    SUBSTANCE/SEXUAL HISTORY:  Social History     Socioeconomic History    Marital status: Single    Number of children: 2   Tobacco Use    Smoking status: Former     Packs/day: 0.25     Years: 5.00     Additional pack years: 0.00     Total pack years: 1.25     Types: Cigarettes     Quit date:      Years since quittin.1     Passive exposure: Past    Smokeless tobacco: Never    Tobacco comments:     smoked less than 1 pack per day for 10 year   Vaping Use    Vaping Use: Never used   Substance and Sexual Activity    Alcohol use: Yes     Comment: once every couple of months-7 or less drinks per week    Drug use: No    Sexual activity: Not Currently     Partners: Male     Birth control/protection: Tubal ligation      FAMILY HISTORY:  family history includes Arthritis in her mother; Breast cancer in her maternal grandmother; Diabetes in her father, paternal grandfather, paternal grandmother, and sister; Heart attack in her father; No Known Problems in an other family member; Osteoporosis in her paternal grandmother; Pancreatitis in her father; Stroke in her father.     PAST MEDICAL HISTORY   has a past medical history of Acute upper respiratory infection, Anemia, Anxiety, Arthritis, Benzodiazepine dependence (2023), Chronic pain disorder, Depression, External hemorrhoid, thrombosed, Fibromyalgia, Folliculitis, ARCENIO (generalized anxiety disorder), Gastroparesis, GERD (gastroesophageal reflux disease), GERD without esophagitis, Goiter (2016), Heartburn, Hemorrhoids, High risk medication use, History of staph infection, Hypertriglyceridemia, Immunization deficiency, Infected prosthetic mesh of abdominal wall, Insomnia, MRSA carrier, Persistent insomnia, Pica, PONV (postoperative nausea and vomiting), Shingles, Strain of rectus abdominis muscle, Vitamin B12 deficiency, and Volvulus of intestine.    She has no past medical history of ADHD (attention deficit hyperactivity disorder), Alcohol abuse,  Alcoholism, Anorexia nervosa, Autism spectrum disorder, Bipolar disorder, Borderline personality disorder, Bulimia nervosa, Cancer, Hard to intubate, Head injury, HIV disease, Liver disease, Malignant hyperthermia due to anesthesia, Obsessive-compulsive disorder, Oppositional defiant disorder, Panic disorder, Peripheral neuropathy, Psychiatric illness, Psychosis, PTSD (post-traumatic stress disorder), Schizoaffective disorder, Seizures, Self-injurious behavior, Spinal headache, Suicide attempt, Violence, history of, Withdrawal symptoms, alcohol, or Withdrawal symptoms, drug or narcotic.     Review of Systems   Constitutional: Negative.  Negative for chills and fever.   Respiratory:  Negative for chest tightness and shortness of breath.    Cardiovascular:  Negative for chest pain.   Gastrointestinal:  Negative for nausea and vomiting.   Neurological:  Negative for dizziness and light-headedness.   Psychiatric/Behavioral:  Positive for stress. Negative for sleep disturbance and suicidal ideas.      Objective   Visit Vitals  LMP 07/12/2016      Wt Readings from Last 3 Encounters:   01/03/24 84.8 kg (187 lb)   10/27/23 86.2 kg (190 lb)   10/16/23 86.2 kg (190 lb)     Temp Readings from Last 3 Encounters:   01/03/24 99.4 °F (37.4 °C) (Tympanic)   10/27/23 97.6 °F (36.4 °C) (Tympanic)   06/27/23 98.7 °F (37.1 °C)     BP Readings from Last 3 Encounters:   01/04/24 134/73   10/27/23 122/78   10/16/23 145/89     Pulse Readings from Last 3 Encounters:   01/04/24 99   10/27/23 82   10/16/23 79      Physical Exam  Constitutional:       Appearance: Normal appearance.   HENT:      Head: Normocephalic.   Pulmonary:      Effort: Pulmonary effort is normal.   Skin:     General: Skin is dry.   Neurological:      Mental Status: She is alert and oriented to person, place, and time.     MENTAL STATUS EXAM   General Appearance:  Cleanly groomed and dressed  Eye Contact:  Good eye contact  Attitude:  Cooperative  Motor Activity:  Normal  posture  Speech:  Normal rate, tone, volume  Mood and affect:  Normal, pleasant  Thought Process:  Goal-directed  Associations/ Thought Content:  No delusions  Hallucinations:  None  Suicidal Ideations:  Not present  Homicidal Ideation:  Not present  Sensorium:  Alert  Orientation:  Person, place, time and situation  Attention Span/ Concentration:  Good  Fund of Knowledge:  Appropriate for age and educational level  Intellectual Functioning:  Average range  Insight:  Fair  Judgement:  Fair  Reliability:  Fair    PHQ-9 Depression Screening  Little interest or pleasure in doing things? (P) 0-->not at all   Feeling down, depressed, or hopeless? (P) 0-->not at all   Trouble falling or staying asleep, or sleeping too much? (P) 2-->more than half the days   Feeling tired or having little energy?     Poor appetite or overeating? (P) 0-->not at all   Feeling bad about yourself/you are a failure/have let yourself or your family down? (P) 0-->not at all   Trouble concentrating on things? (P) 0-->not at all   Psychomotor agitation/retardation (P) 0-->not at all   Thoughts about death/dying/suicide (P) 0-->not at all   PHQ-9 Total Score (P) 2   How difficult have these problems for you? (P) not difficult at all     GAD7 Documentation:  Feeling nervous, anxious or on edge (P) 1   Not being able to stop or control worrying (P) 1   Worrying too much about different things (P) 0   Trouble relaxing (P) 0   Being so restless that it is hard to sit still (P) 0   Becoming easily annoyed or irritable (P) 0   Feeling Afraid as if something awful might happen (P) 0   ARCENIO Total Score (P) 2   How difficult have these problems made it for you? (P) Not difficult at all     LABS:  No visits with results within 1 Month(s) from this visit.   Latest known visit with results is:   Admission on 01/03/2024, Discharged on 01/04/2024   Component Date Value Ref Range Status    QT Interval 01/03/2024 310  ms Final    QTC Interval 01/03/2024 431  ms  Final    Glucose 01/03/2024 141 (H)  65 - 99 mg/dL Final    BUN 01/03/2024 24 (H)  6 - 20 mg/dL Final    Creatinine 01/03/2024 0.78  0.57 - 1.00 mg/dL Final    Sodium 01/03/2024 137  136 - 145 mmol/L Final    Potassium 01/03/2024 4.1  3.5 - 5.2 mmol/L Final    Chloride 01/03/2024 105  98 - 107 mmol/L Final    CO2 01/03/2024 20.0 (L)  22.0 - 29.0 mmol/L Final    Calcium 01/03/2024 8.6  8.6 - 10.5 mg/dL Final    Total Protein 01/03/2024 7.0  6.0 - 8.5 g/dL Final    Albumin 01/03/2024 4.6  3.5 - 5.2 g/dL Final    ALT (SGPT) 01/03/2024 209 (H)  1 - 33 U/L Final    AST (SGOT) 01/03/2024 289 (H)  1 - 32 U/L Final    Alkaline Phosphatase 01/03/2024 166 (H)  39 - 117 U/L Final    Total Bilirubin 01/03/2024 0.4  0.0 - 1.2 mg/dL Final    Globulin 01/03/2024 2.4  gm/dL Final    A/G Ratio 01/03/2024 1.9  g/dL Final    BUN/Creatinine Ratio 01/03/2024 30.8 (H)  7.0 - 25.0 Final    Anion Gap 01/03/2024 12.0  5.0 - 15.0 mmol/L Final    eGFR 01/03/2024 91.5  >60.0 mL/min/1.73 Final    HS Troponin T 01/03/2024 <6  <14 ng/L Final    Extra Tube 01/03/2024 Hold for add-ons.   Final    Auto resulted.    Extra Tube 01/03/2024 hold for add-on   Final    Auto resulted    Extra Tube 01/03/2024 Hold for add-ons.   Final    Auto resulted.    Extra Tube 01/03/2024 Hold for add-ons.   Final    Auto resulted    WBC 01/03/2024 11.28 (H)  3.40 - 10.80 10*3/mm3 Final    RBC 01/03/2024 4.59  3.77 - 5.28 10*6/mm3 Final    Hemoglobin 01/03/2024 13.9  12.0 - 15.9 g/dL Final    Hematocrit 01/03/2024 42.3  34.0 - 46.6 % Final    MCV 01/03/2024 92.2  79.0 - 97.0 fL Final    MCH 01/03/2024 30.3  26.6 - 33.0 pg Final    MCHC 01/03/2024 32.9  31.5 - 35.7 g/dL Final    RDW 01/03/2024 12.0 (L)  12.3 - 15.4 % Final    RDW-SD 01/03/2024 40.6  37.0 - 54.0 fl Final    MPV 01/03/2024 10.3  6.0 - 12.0 fL Final    Platelets 01/03/2024 304  140 - 450 10*3/mm3 Final    Neutrophil % 01/03/2024 91.9 (H)  42.7 - 76.0 % Final    Lymphocyte % 01/03/2024 3.2 (L)  19.6 - 45.3  % Final    Monocyte % 01/03/2024 3.0 (L)  5.0 - 12.0 % Final    Eosinophil % 01/03/2024 1.4  0.3 - 6.2 % Final    Basophil % 01/03/2024 0.2  0.0 - 1.5 % Final    Immature Grans % 01/03/2024 0.3  0.0 - 0.5 % Final    Neutrophils, Absolute 01/03/2024 10.37 (H)  1.70 - 7.00 10*3/mm3 Final    Lymphocytes, Absolute 01/03/2024 0.36 (L)  0.70 - 3.10 10*3/mm3 Final    Monocytes, Absolute 01/03/2024 0.34  0.10 - 0.90 10*3/mm3 Final    Eosinophils, Absolute 01/03/2024 0.16  0.00 - 0.40 10*3/mm3 Final    Basophils, Absolute 01/03/2024 0.02  0.00 - 0.20 10*3/mm3 Final    Immature Grans, Absolute 01/03/2024 0.03  0.00 - 0.05 10*3/mm3 Final    nRBC 01/03/2024 0.0  0.0 - 0.2 /100 WBC Final    D-Dimer, Quantitative 01/03/2024 0.61 (H)  0.00 - 0.52 MCGFEU/mL Final    Magnesium 01/03/2024 2.1  1.6 - 2.6 mg/dL Final    COVID19 01/03/2024 Not Detected  Not Detected - Ref. Range Final    Influenza A PCR 01/03/2024 Not Detected  Not Detected Final    Influenza B PCR 01/03/2024 Not Detected  Not Detected Final    RSV, PCR 01/03/2024 Not Detected  Not Detected Final    HS Troponin T 01/04/2024 7  <14 ng/L Final    Troponin T Delta 01/04/2024    Final    Unable to calculate.      No Known Allergies     Current Outpatient Medications   Medication Instructions    Bioflavonoid Products (VITAMIN C) chewable tablet 240 mg, Oral, 2 Times Daily    cloNIDine (CATAPRES) 0.2 MG tablet TAKE ONE TABLET BY MOUTH ONCE NIGHTLY *MONITOR FOR SIGNS AND SYMPTOMS OF LOW BLOOD PRESSURE, REPORT TO PROVIDER IF TEHY OCCUR    diclofenac (VOLTAREN) 4 g, Transdermal, 4 Times Daily    estradiol (MINIVELLE, VIVELLE-DOT) 0.05 MG/24HR patch APPLY ONE PATCH TO SKIN TWO TIMES A WEEK    hydrocortisone 2.5 % ointment     omeprazole OTC (PRILOSEC OTC) 20 mg, Oral, Daily    Progesterone (PROMETRIUM) 100 mg, Oral, Nightly    promethazine (PHENERGAN) 25 mg, Oral, Every 6 Hours PRN    valACYclovir (VALTREX) 1,000 mg, Oral, 3 Times Daily, Take for 7 days if outbreak    venlafaxine  XR (EFFEXOR XR) 150 mg, Oral, Daily    vitamin B-12 (CYANOCOBALAMIN) 1,000 mcg, Oral, Daily    vitamin D3 5,000 Units, Oral, Daily    zolpidem (AMBIEN) 10 mg, Oral, Nightly PRN      Assessment    ASSESSMENT/TREATMENT PLAN/INSTRUCTIONS/EDUCATION     (G47.00) Insomnia, unspecified type    (F41.1) Generalized anxiety disorder    (Z79.899) High risk medication use    (R74.8) Elevated liver enzymes     Insomnia, stable, continue Ambien/clonidine combination   Anxiety, stable, continue Effexor  High risk medication use, unable to review CARLOS ALBERTO via interface, will order manual  Elevated liver enzymes, newly identified to this provider, patient denies excessive alcohol use but does report on/off excessive use of Tylenol following up with medical provider  Therapy, EMDR  F/U,     -Controlled Substance Instructions:  Patient has been educated on additional monitoring that is required including regular follow up appointments, CARLOS ALBERTO reports, random urine drug screens, and random pill counts.    Patient has read/reviewed controlled substance agreement and understands this must be renewed yearly.  Patient educated that continuation of a controlled substance is at the discretion of the provider.     Patient educated that prescribing will not continue unless follow-up appointments are maintained.  Prescriptions can only be sent to a [place] pharmacy, 30 day supply with limited number of refills. Refill requests will only be made/reviewed during normal business hours.   Patient has been educated on the risk versus benefit of being prescribed a controlled substance including dependence/tolerance/abuse/addiction/diversion.  Patient has been educated on non-controlled substance alternatives that carry with them much less risk and require less monitoring.  If the patient is unwilling or unable to comply with the above listed monitoring the controlled substance will no longer be prescribed.     -Please call the office at [phone] with  any worsening of symptoms or onset of intolerable side effects, please ask to leave a message with Ricky's medical assistant.  Please give my office up to 48 hours to respond to a patient call/question/refill request.  -Patient is agreeable to call 911 or go to the nearest ER should he/she/they have any thoughts of harm to self or others.  -Patient has been educated on providers schedule, contact information, and patient/provider expectations.     FOLLOW UP: Return in about 3 months (around 5/26/2024) for Video visit.    ADDITIONAL MONITORING FOR CONTROLLED SUBSTANCE:  -Narc Contract signed, renewed yearly: 9/23      -PDMP via EMR interface reviewed during f/u patricia'ts and med refill requests  -UDS: random     -Without the prescribed controlled substance, it is reported that the patient has problems with the treated mental health condition. Due to the reported problems without the medication usage, as well as the significant improvement in symptoms with the medication usage, the patient requests to remain on the prescribed medication.    There are no discontinued medications.           No orders of the defined types were placed in this encounter.    -Barriers: high risk medication, multiple psychiatric comorbidities , and stress  -Strengths:  motivated  and strong support system    -Short-Term Goals: Pt will be compliant with medication management and note improvement in S/S over the next 4 to 6 weeks or at next scheduled visit.  -Long-Term Goals: Pt will be compliant with the agreed treatment plan including medication regimen & F/U appt's and deny impairment in daily functioning over the next 6 months.      -Progress toward goal: At Goal  -Functional Status: Moderate impairment   -Prognosis: Guarded with Ongoing Treatment     SUMMARY/DISCUSSION:  Pt past social/medical/family history reviewed/updated. ROS conducted, medications/allergies, reviewed.  Most recent vitals/labs reviewed. Pt was given appropriate time to  ask questions and concerns were addressed. A thorough discussion was had that included review of disease process, need for continued monitoring and additional treatment options including use of pharmacological and non-pharmacological approaches to care, decisions were made and agreed upon by patient and provider. Discussed the risks, benefits, and potential side effects of the medications; patient ackowledged and verbally consented.     Part of this note may be an electronic transcription/translation of spoken language to printed text using the Dragon Dictation System. Some of the data in this electronic note has been brought forward from a previous encounter, any necessary changes have been made, it has been reviewed by this APRN, and it is accurate.    This document has been electronically signed by BELLA Farah February 26, 2024 12:06 EST

## 2024-05-05 NOTE — TELEPHONE ENCOUNTER
PATIENT STATES HER INSURANCE WILL NOT COVER clonazePAM (KlonoPIN) 0.5 MG tablet AT 4.5 PER DAY. ONLY 3.5 SO SHE NEEDS THE SCRIPT REWRITTEN FOR JUST 3.5 PER DAY AND THEN A SCRIPT FOR JOHN CALLED IN TO TRY AT BED TIME.     [facility]/pharmacy #[id number] - [place], [place] - [address] - [phone]  - [phone]   [phone]    PATIENT> [phone]                                        
Patient preferred [employer] message, sent Dr Gamez's instructions via [employer].  
Tell her I sent over the clonazepam for 3.5 a day but can no longer mix ambien with it.  She can try unisom sleep tabs over the counter temporarily for sleep.     
x 2/25 feet

## 2024-05-06 DIAGNOSIS — F99 INSOMNIA DUE TO OTHER MENTAL DISORDER: Primary | ICD-10-CM

## 2024-05-06 DIAGNOSIS — F51.05 INSOMNIA DUE TO OTHER MENTAL DISORDER: Primary | ICD-10-CM

## 2024-05-06 DIAGNOSIS — F13.20 BENZODIAZEPINE DEPENDENCE: ICD-10-CM

## 2024-05-06 DIAGNOSIS — F41.1 GENERALIZED ANXIETY DISORDER: ICD-10-CM

## 2024-05-06 DIAGNOSIS — Z79.899 HIGH RISK MEDICATION USE: ICD-10-CM

## 2024-05-06 RX ORDER — ZOLPIDEM TARTRATE 10 MG/1
10 TABLET ORAL NIGHTLY PRN
Qty: 30 TABLET | Refills: 2 | Status: SHIPPED | OUTPATIENT
Start: 2024-05-07

## 2024-05-13 ENCOUNTER — TELEPHONE (OUTPATIENT)
Dept: FAMILY MEDICINE CLINIC | Facility: CLINIC | Age: 54
End: 2024-05-13
Payer: COMMERCIAL

## 2024-05-13 NOTE — TELEPHONE ENCOUNTER
Informed patient that refill was confirmed by pharmacy as received a few days back. Patient states she will contact pharmacy for follow up.

## 2024-05-28 ENCOUNTER — TELEMEDICINE (OUTPATIENT)
Dept: BEHAVIORAL HEALTH | Facility: CLINIC | Age: 54
End: 2024-05-28
Payer: COMMERCIAL

## 2024-05-28 DIAGNOSIS — F41.1 GENERALIZED ANXIETY DISORDER: ICD-10-CM

## 2024-05-28 DIAGNOSIS — Z79.899 HIGH RISK MEDICATION USE: ICD-10-CM

## 2024-05-28 DIAGNOSIS — G47.00 INSOMNIA, UNSPECIFIED TYPE: Primary | ICD-10-CM

## 2024-05-28 PROCEDURE — 99214 OFFICE O/P EST MOD 30 MIN: CPT

## 2024-05-28 RX ORDER — ESZOPICLONE 2 MG/1
2 TABLET, FILM COATED ORAL NIGHTLY PRN
Qty: 14 TABLET | Refills: 0 | Status: SHIPPED | OUTPATIENT
Start: 2024-05-28

## 2024-05-28 NOTE — PATIENT INSTRUCTIONS
-Controlled Substance Instructions:  Patient has been educated on additional monitoring that is required including regular follow up appointments, CARLOS ALBERTO reports, random urine drug screens, and random pill counts.    Patient has read/reviewed controlled substance agreement and understands this must be renewed yearly.  Patient educated that continuation of a controlled substance is at the discretion of the provider.     Patient educated that prescribing will not continue unless follow-up appointments are maintained.  Prescriptions can only be sent to a [place] pharmacy, 30 day supply with limited number of refills. Refill requests will only be made/reviewed during normal business hours.   Patient has been educated on the risk versus benefit of being prescribed a controlled substance including dependence/tolerance/abuse/addiction/diversion.  Patient has been educated on non-controlled substance alternatives that carry with them much less risk and require less monitoring.  If the patient is unwilling or unable to comply with the above listed monitoring the controlled substance will no longer be prescribed.      GENERAL NEW PATIENT INSTRUCTIONS:    -Please arrive in person or virtually 10-15 minutes prior to appointment to allow for registration/sign in/questionnaires. If you are seen virtually please review after visit summary (AVS)/patient instructions via [email or web address] for a summary of plan of care/changes in treatment plan. If you are having difficulties logging on or accessing [email or web address] please contact my office for assistance.    -The best way to get a hold of Ricky is to call the office at [phone] and ask to leave a message with his medical assistant. Ricky Anaya is a Psychiatric Mental Health Nurse Practitioner, due to his specialty patient's are not able to message him directly via [email or web address]. Please give my office up to 48 hours to respond to a patient call/question/refill request. Refill requests will be made during  normal office hours only, Monday-Friday 8:00-5:30.      -Ricky is out of the office on Fridays and weekends. Tuesdays and Thursdays are Ricky's in-office days, Mondays and Wednesdays are his telehealth days.  The decision to be seen virtually or in person is up to the discretion of the provider, not all behavioral health problems are appropriate for telehealth.    -Please call the office at [phone] with any worsening of symptoms or onset of intolerable side effects.  -Follow-up appointments must be maintained in order for prescribing to continue.  -Patient has been educated regarding multimodal approach with healthy nutrition, healthy sleep, regular physical activity, social activities, counseling, and medications.   -Please call 911 or go to the nearest ER if you begin to have thoughts of harming yourself or other people.    No show policy:  We understand unexpected circumstances arise; however, anytime you miss your appointment we are unable to provide you appropriate care.  In addition, each appointment missed could have been used to provide care for others.  We ask that you call at least 24 hours in advance to cancel or reschedule an appointment. We would like to take this opportunity to remind you of our policy stating patients who miss THREE or more appointments without cancelling or rescheduling 24 hours in advance of the appointment may be subject to cancellation of any further visits with our clinic and recommendation to seek in-person services/visits. Please call [phone] to reschedule your appointment. If there are reasons that make it difficult for you to keep the appointments, please call and let us know how we can help. Please understand that medication prescribing will not continue without seeing your provider.
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
## 2024-05-31 DIAGNOSIS — F41.1 GENERALIZED ANXIETY DISORDER: ICD-10-CM

## 2024-05-31 DIAGNOSIS — F13.20 BENZODIAZEPINE DEPENDENCE: ICD-10-CM

## 2024-05-31 DIAGNOSIS — F99 INSOMNIA DUE TO OTHER MENTAL DISORDER: ICD-10-CM

## 2024-05-31 DIAGNOSIS — F51.05 INSOMNIA DUE TO OTHER MENTAL DISORDER: ICD-10-CM

## 2024-05-31 DIAGNOSIS — F32.1 CURRENT MODERATE EPISODE OF MAJOR DEPRESSIVE DISORDER WITHOUT PRIOR EPISODE: ICD-10-CM

## 2024-05-31 RX ORDER — VENLAFAXINE HYDROCHLORIDE 150 MG/1
CAPSULE, EXTENDED RELEASE ORAL
Qty: 90 CAPSULE | Refills: 0 | Status: SHIPPED | OUTPATIENT
Start: 2024-05-31

## 2024-06-07 DIAGNOSIS — F32.1 CURRENT MODERATE EPISODE OF MAJOR DEPRESSIVE DISORDER WITHOUT PRIOR EPISODE: ICD-10-CM

## 2024-06-07 DIAGNOSIS — Z79.899 HIGH RISK MEDICATION USE: ICD-10-CM

## 2024-06-07 DIAGNOSIS — F41.1 GENERALIZED ANXIETY DISORDER: ICD-10-CM

## 2024-06-07 DIAGNOSIS — F51.05 INSOMNIA DUE TO OTHER MENTAL DISORDER: ICD-10-CM

## 2024-06-07 DIAGNOSIS — F13.20 BENZODIAZEPINE DEPENDENCE: ICD-10-CM

## 2024-06-07 DIAGNOSIS — F99 INSOMNIA DUE TO OTHER MENTAL DISORDER: ICD-10-CM

## 2024-06-07 RX ORDER — VENLAFAXINE HYDROCHLORIDE 150 MG/1
150 CAPSULE, EXTENDED RELEASE ORAL DAILY
Qty: 90 CAPSULE | Refills: 0 | OUTPATIENT
Start: 2024-06-07

## 2024-06-07 RX ORDER — CLONIDINE HYDROCHLORIDE 0.2 MG/1
TABLET ORAL
Qty: 90 TABLET | Refills: 0 | Status: SHIPPED | OUTPATIENT
Start: 2024-06-07

## 2024-06-10 ENCOUNTER — TELEPHONE (OUTPATIENT)
Dept: BEHAVIORAL HEALTH | Facility: CLINIC | Age: 54
End: 2024-06-10
Payer: COMMERCIAL

## 2024-06-10 DIAGNOSIS — G47.00 INSOMNIA, UNSPECIFIED TYPE: ICD-10-CM

## 2024-06-10 DIAGNOSIS — F41.1 GENERALIZED ANXIETY DISORDER: ICD-10-CM

## 2024-06-10 DIAGNOSIS — F32.1 CURRENT MODERATE EPISODE OF MAJOR DEPRESSIVE DISORDER WITHOUT PRIOR EPISODE: ICD-10-CM

## 2024-06-10 RX ORDER — ESZOPICLONE 3 MG/1
3 TABLET, FILM COATED ORAL NIGHTLY PRN
Qty: 14 TABLET | Refills: 0 | Status: SHIPPED | OUTPATIENT
Start: 2024-06-10 | End: 2024-06-24

## 2024-06-10 RX ORDER — VENLAFAXINE HYDROCHLORIDE 150 MG/1
150 CAPSULE, EXTENDED RELEASE ORAL DAILY
Qty: 90 CAPSULE | Refills: 0 | Status: SHIPPED | OUTPATIENT
Start: 2024-06-10

## 2024-06-10 NOTE — TELEPHONE ENCOUNTER
Patient calling to update provider on how Lunesta is working. Patient states she thinks it is working appropriately, but has had a daily headache and is not sleeping through the night. Patient wondering if she can have an additional two week refill, but at the higher 3mg dose. Please advise if this is appropriate. Patient states if she continues to have headaches, she will not be able to take the medication.    Please send refill to pharmacy below:    [facility] [id number] - [facility] [id number] [place] [facility] [place] [address] [facility] [phone] [facility] [phone] [[id number]]

## 2024-06-11 DIAGNOSIS — F32.1 CURRENT MODERATE EPISODE OF MAJOR DEPRESSIVE DISORDER WITHOUT PRIOR EPISODE: ICD-10-CM

## 2024-06-11 DIAGNOSIS — F41.1 GENERALIZED ANXIETY DISORDER: ICD-10-CM

## 2024-06-11 RX ORDER — VENLAFAXINE HYDROCHLORIDE 150 MG/1
150 CAPSULE, EXTENDED RELEASE ORAL DAILY
Qty: 90 CAPSULE | Refills: 0 | OUTPATIENT
Start: 2024-06-11

## 2024-07-03 DIAGNOSIS — F41.1 GENERALIZED ANXIETY DISORDER: ICD-10-CM

## 2024-07-03 DIAGNOSIS — F32.1 CURRENT MODERATE EPISODE OF MAJOR DEPRESSIVE DISORDER WITHOUT PRIOR EPISODE: ICD-10-CM

## 2024-07-03 RX ORDER — VENLAFAXINE HYDROCHLORIDE 150 MG/1
150 CAPSULE, EXTENDED RELEASE ORAL DAILY
Qty: 90 CAPSULE | Refills: 0 | OUTPATIENT
Start: 2024-07-03

## 2024-08-27 ENCOUNTER — TELEMEDICINE (OUTPATIENT)
Dept: BEHAVIORAL HEALTH | Facility: CLINIC | Age: 54
End: 2024-08-27
Payer: COMMERCIAL

## 2024-08-27 DIAGNOSIS — Z79.899 HIGH RISK MEDICATION USE: ICD-10-CM

## 2024-08-27 DIAGNOSIS — G47.00 INSOMNIA, UNSPECIFIED TYPE: Primary | ICD-10-CM

## 2024-08-27 DIAGNOSIS — R74.8 ELEVATED LIVER ENZYMES: ICD-10-CM

## 2024-08-27 PROCEDURE — 99214 OFFICE O/P EST MOD 30 MIN: CPT

## 2024-08-27 RX ORDER — ZOLPIDEM TARTRATE 10 MG/1
10 TABLET ORAL NIGHTLY PRN
Qty: 30 TABLET | Refills: 2 | Status: SHIPPED | OUTPATIENT
Start: 2024-08-27
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
## 2024-08-27 NOTE — PATIENT INSTRUCTIONS
-Controlled Substance Instructions:  Patient has been educated on additional monitoring that is required including regular follow up appointments, CARLOS ALBERTO reports, random urine drug screens, and random pill counts.    Patient has read/reviewed controlled substance agreement and understands this must be renewed yearly.  Patient educated that continuation of a controlled substance is at the discretion of the provider.     Patient educated that prescribing will not continue unless follow-up appointments are maintained.  Prescriptions can only be sent to a [place] pharmacy, 30 day supply with limited number of refills. Refill requests will only be made/reviewed during normal business hours.   Patient has been educated on the risk versus benefit of being prescribed a controlled substance including dependence/tolerance/abuse/addiction/diversion.  Patient has been educated on non-controlled substance alternatives that carry with them much less risk and require less monitoring.  If the patient is unwilling or unable to comply with the above listed monitoring the controlled substance will no longer be prescribed.      GENERAL NEW PATIENT INSTRUCTIONS:    -Please arrive in person or virtually 10-15 minutes prior to appointment to allow for registration/sign in/questionnaires. If you are seen virtually please review after visit summary (AVS)/patient instructions via [email or web address] for a summary of plan of care/changes in treatment plan. If you are having difficulties logging on or accessing [email or web address] please contact my office for assistance.    -The best way to get a hold of Ricky is to call the office at [phone] and ask to leave a message with his medical assistant. Ricky Anaya is a Psychiatric Mental Health Nurse Practitioner, due to his specialty patient's are not able to message him directly via [email or web address]. Please give my office up to 48 hours to respond to a patient call/question/refill request. Refill requests will be made during  normal office hours only, Monday-Friday 8:00-5:30.      -Ricky is out of the office on Fridays and weekends. Tuesdays and Thursdays are Ricky's in-office days, Mondays and Wednesdays are his telehealth days.  The decision to be seen virtually or in person is up to the discretion of the provider, not all behavioral health problems are appropriate for telehealth.    -Please call the office at [phone] with any worsening of symptoms or onset of intolerable side effects.  -Follow-up appointments must be maintained in order for prescribing to continue.  -Patient has been educated regarding multimodal approach with healthy nutrition, healthy sleep, regular physical activity, social activities, counseling, and medications.   -Please call 911 or go to the nearest ER if you begin to have thoughts of harming yourself or other people.    No show policy:  We understand unexpected circumstances arise; however, anytime you miss your appointment we are unable to provide you appropriate care.  In addition, each appointment missed could have been used to provide care for others.  We ask that you call at least 24 hours in advance to cancel or reschedule an appointment. We would like to take this opportunity to remind you of our policy stating patients who miss THREE or more appointments without cancelling or rescheduling 24 hours in advance of the appointment may be subject to cancellation of any further visits with our clinic and recommendation to seek in-person services/visits. Please call [phone] to reschedule your appointment. If there are reasons that make it difficult for you to keep the appointments, please call and let us know how we can help. Please understand that medication prescribing will not continue without seeing your provider.

## 2024-10-01 ENCOUNTER — TELEPHONE (OUTPATIENT)
Dept: FAMILY MEDICINE CLINIC | Facility: CLINIC | Age: 54
End: 2024-10-01
Payer: COMMERCIAL

## 2024-10-01 DIAGNOSIS — G47.00 INSOMNIA, UNSPECIFIED TYPE: Primary | ICD-10-CM

## 2024-10-01 DIAGNOSIS — F41.1 GENERALIZED ANXIETY DISORDER: ICD-10-CM

## 2024-10-01 RX ORDER — CLONIDINE HYDROCHLORIDE 0.1 MG/1
0.1 TABLET ORAL NIGHTLY
Qty: 30 TABLET | Refills: 0 | Status: SHIPPED | OUTPATIENT
Start: 2024-10-01

## 2024-10-01 NOTE — TELEPHONE ENCOUNTER
Pt wants to speak to you about restarting Clonidine. Says she has been off of it for a while but would like to restart to help her sleep.  [phone]

## 2024-10-22 ENCOUNTER — TELEPHONE (OUTPATIENT)
Dept: FAMILY MEDICINE CLINIC | Facility: CLINIC | Age: 54
End: 2024-10-22
Payer: COMMERCIAL

## 2024-10-22 DIAGNOSIS — G47.00 INSOMNIA, UNSPECIFIED TYPE: ICD-10-CM

## 2024-10-22 DIAGNOSIS — Z79.899 HIGH RISK MEDICATION USE: ICD-10-CM

## 2024-10-22 RX ORDER — ZOLPIDEM TARTRATE 10 MG/1
10 TABLET ORAL NIGHTLY PRN
Qty: 30 TABLET | Refills: 2 | Status: SHIPPED | OUTPATIENT
Start: 2024-10-22

## 2024-10-22 NOTE — TELEPHONE ENCOUNTER
Patient told STEFAN that you would take her daughter Esther Lopez 4[birth year]20[birth year]1988 on as a new patient but nothing is documented.  Please advise.

## 2024-10-22 NOTE — TELEPHONE ENCOUNTER
Patient called [facility] is out of stock on Zolpidem and is asking to have prescription change to [facility]  [phone]

## 2024-11-25 DIAGNOSIS — G47.00 INSOMNIA, UNSPECIFIED TYPE: ICD-10-CM

## 2024-11-25 DIAGNOSIS — F41.1 GENERALIZED ANXIETY DISORDER: ICD-10-CM

## 2024-11-25 NOTE — TELEPHONE ENCOUNTER
Pt called requesting a refill on Clonidine sent to [facility] on [address] in [facility]. Has an appt with a new provider on 12/23

## 2024-11-26 RX ORDER — CLONIDINE HYDROCHLORIDE 0.1 MG/1
0.1 TABLET ORAL NIGHTLY
Qty: 30 TABLET | Refills: 0 | Status: SHIPPED | OUTPATIENT
Start: 2024-11-26

## 2024-11-26 NOTE — TELEPHONE ENCOUNTER
LAST REFILL - 10/01/24  LAST VISIT - 08/27/24  NEXT VISIT - new psych appt 12/23/24    Routing to covering provider Dr. Gomes due to Rafael Anaya offboarding

## 2024-12-23 ENCOUNTER — OFFICE VISIT (OUTPATIENT)
Age: 54
End: 2024-12-23
Payer: COMMERCIAL

## 2024-12-23 VITALS
WEIGHT: 164 LBS | BODY MASS INDEX: 28 KG/M2 | HEART RATE: 103 BPM | DIASTOLIC BLOOD PRESSURE: 84 MMHG | HEIGHT: 64 IN | OXYGEN SATURATION: 96 % | SYSTOLIC BLOOD PRESSURE: 121 MMHG

## 2024-12-23 DIAGNOSIS — Z79.899 HIGH RISK MEDICATION USE: ICD-10-CM

## 2024-12-23 DIAGNOSIS — G47.00 INSOMNIA, UNSPECIFIED TYPE: ICD-10-CM

## 2024-12-23 DIAGNOSIS — F41.1 GENERALIZED ANXIETY DISORDER: ICD-10-CM

## 2024-12-23 DIAGNOSIS — F32.1 CURRENT MODERATE EPISODE OF MAJOR DEPRESSIVE DISORDER WITHOUT PRIOR EPISODE: ICD-10-CM

## 2024-12-23 RX ORDER — VENLAFAXINE HYDROCHLORIDE 150 MG/1
150 CAPSULE, EXTENDED RELEASE ORAL DAILY
Qty: 90 CAPSULE | Refills: 0 | Status: SHIPPED | OUTPATIENT
Start: 2024-12-23

## 2024-12-23 RX ORDER — ZOLPIDEM TARTRATE 10 MG/1
10 TABLET ORAL NIGHTLY PRN
Qty: 30 TABLET | Refills: 0 | Status: SHIPPED | OUTPATIENT
Start: 2024-12-23 | End: 2024-12-23

## 2024-12-23 RX ORDER — CLONIDINE HYDROCHLORIDE 0.2 MG/1
0.2 TABLET ORAL NIGHTLY
Qty: 30 TABLET | Refills: 2 | Status: SHIPPED | OUTPATIENT
Start: 2024-12-23 | End: 2025-03-23

## 2024-12-23 NOTE — PROGRESS NOTES
"Chief Complaint: \"establish for psychiatric medication management\"    Subjective      Referring Provider: Gamal Gamez MD    HPI :     Juana Lopez presents to [facility] for an initial psychiatric evaluation.     The patient is a 54 y.o. female presenting for an initial psychiatric evaluation to establish care. She was previously under the care of VICKIE Farah. She presents today for management of depression, anxiety, and insomnia. She reports stability in her current treatment regimen including venlafaxine  mg daily, clonidine 0.2 mg at night, and zolpidem 10 mg at night. She describes a history of back issues, related to a familial pattern of similar problems, leading to lumbar surgery approximately 12-13 years ago. The patient underwent laparoscopic surgery in [place], which she describes as a turning point in her life, resulting in immediate pain relief from her previous conditions, which included bulging discs. Despite significant family history of back surgeries, including a niece who remains in severe pain, the patient feels her surgical choice was successful, preventing ongoing pain experienced by relatives. Prior to surgery, her condition was severe enough to prevent participation in typical daily activities without considerable interruption.    The patient's psychiatric hx includes significant depressive episodes, and insomnia managed with medication. She was previously on Trazodone for sleep, but it was not effective in managing insomnia. Her current medication regimen includes Venlafaxine 150 mg, which she has found highly effective for depression, claiming she has been free of depressive sx for over a year since titrating to this dose. Pt relayed that Venlafaxine is the most effective anitdepressant she has used, with no weight gain observed, unlike prior antidepressants that contributed to weight gain and aggravated depressive sx. She " "currently reports a mood baseline leaning toward feeling \"more on the happy side.\" Additionally, pt manages sleep with Ambien and clonidine, reporting a regular schedule of approximately 8 hours of sleep per night.    Pt reports a longstanding hx of severe insomnia exacerbated by past stress and anxiety. Previously rx'd clonazepam for 10+ yrs to manage symptoms. Pt currently experiences improved sleep quality w/ current regimen of zolpidem + clonidine, which she reports works well together. Describes fragmented sleep if meds not taken together. Current Sleep Quantity averaging 8 hrs of sleep/night, typically asleep by 11 PM and up by 7 AM, regulated by a routine w/ her puppy. Sleep Hygiene and Bedtime Patterns: Pt adheres to consistent bedtime ritual, retiring by 10 PM most nights. Employs pharmacological aids to sustain sleep. No other noted sleep hygiene issues.    The pt has experienced panic attacks, exacerbated by a traumatic event involving a neighbor's dog attack. This incident, which resulted in significant anxiety, led to her maintaining a heightened sense of vigilance, affecting daily life. Anxiety sx included constant fear for safety, inability to leave the home without defensive items, and significant distress in presence of perceived threats. Following the neighbor's relocation, pt reports improvement in anxiety sx. Pt has been through EMDR therapy, seen multiple therapists, and sought medical care for the dog bite, which has healed.    Previous medication trials include: Trazodone (past): Previously used for sleep but caused significant issues when discontinuing, leading to depression and insomnia, Paxil, Buspirone, Wellbutrin, Cymbalta (past, dates unclear): Pt reports having tried these medications with various results. Cymbalta initially thought for fibromyalgia. Overall, pt expresses satisfaction with her current treatment plan for psychiatric management, with significant family support in her " healthcare journey. She denies SI/HI/AVH.    Psychiatric Review of Systems: Denies symptoms of noemi, hypomania, psychosis, and delusional thinking. Endorses anxiety, panic; denies phobias, OCD. Denies history of eating disorders.Denies history of self-harm behavior. Denies current SI/HI/AVH. Denies physical, verbal, an sexual abuse. No personality disorders noted at this time.    Past Psychiatric History: Previous psychiatric diagnoses include depression, anxiety, PTSD, and insomnia. The patient denies previous psychiatric hospitalization.  Endorses history of counseling. Denies previous suicide attempts. Denies self-harming behavior.    Substance Use/Abuse: The patient endorses taking 1/4 caffeine tablet occasionally; denies drinking coffee/sodas/energy drinks. Denies drinking alcohol. Endorses tobacco use/smoking as teenager up until 12 years ago/5-6 cigs per day. Marijuana use as teenager; no other illicit substance or IV drug use. Denies history of formal substance abuse treatment.    Social History: She was born and raised in [place], [place]. She was raised by her biological mother. She has one brother and 2 sisters. Her highest level of education is high school. The patient is currently employed. She describes the relationships with her daughters and sisters as supportive relationships. No Rastafari or cultural preferences.    Family Medical History:   Family History   Problem Relation Age of Onset    Heart attack Father     Diabetes Father     Stroke Father     Pancreatitis Father     Diabetes Sister     Breast cancer Maternal Grandmother     Diabetes Paternal Grandmother     Osteoporosis Paternal Grandmother     Diabetes Paternal Grandfather     Arthritis Mother     No Known Problems Other         Family Psychiatric History: Past family psychiatric history includes daughter anxiety/depression. Substance use history father alcoholism (34 years sober).     Trauma History: The patient endorses a history of PTSD  related to dog attack and bite. She has received EMDR therapy and reports symptoms have resolved since neighbor/dog moved.     Legal History: Denies history of incarceration or violence.      History: Denies  history.    Past Medical/Developmental History: Past medical, family, and medication history reviewed in Epic as listed.     Head trauma: Denies history of head injury or concussion.    Seizure History: Denies seizure history.    Past Medical History:   Diagnosis Date    Acute upper respiratory infection     Anemia     Anxiety     Arthritis     Benzodiazepine dependence 08/25/2023    Chronic pain disorder     Depression     External hemorrhoid, thrombosed     Fibromyalgia     Folliculitis     ARCENIO (generalized anxiety disorder)     Gastroparesis     GERD (gastroesophageal reflux disease)     GERD without esophagitis     Goiter 01/27/2016    Heartburn     Hemorrhoids     High risk medication use     History of staph infection     LAST TIME 4 YEARS AGO INSIDE RIGHT NARIS    Hypertriglyceridemia     Immunization deficiency     Infected prosthetic mesh of abdominal wall     Insomnia     MRSA carrier     Persistent insomnia     Pica     PONV (postoperative nausea and vomiting)     Shingles     Strain of rectus abdominis muscle     Vitamin B12 deficiency     Volvulus of intestine         Review of Systems:    Ears, Nose, Mouth, & Throat: Denies difficulty hearing, smelling, sinus problems, sore throat, or dentition.  Heart/Vascular: Denies rapid heart rate, chest pain, swelling of feet or legs   Respiratory: Denies shortness of breath, cough, or wheeze  GI/: Denies nausea, vomiting, constipation, diarrhea, abdominal pain, painful urination, frequent urination  MSK: Endorses back pain from arthritis; denies swelling or joint deformities  Skin: Denies lesions or rash  Neurologic: Denies headaches, dizziness, or tremor   Endocrine: Denies heat or cold intolerance   Hematologic: Denies easy bruising or  bleeding  Psychiatric: Denies insomnia, irritability, depression, anxiety, recurrent bad thoughts, mood swings, hallucinations, compulsions  Allergic/Immunologic: Denies seasonal allergies, hay fever symptoms; denies itching, frequent infections, exposure to HIV    No Known Allergies     Patient's last menstrual period was 07/12/2016.   Birth Control: not applicable    Current Medications:   Current Outpatient Medications   Medication Sig Dispense Refill    Bioflavonoid Products (VITAMIN C) chewable tablet Chew 240 mg 2 (Two) Times a Day.      Cholecalciferol (VITAMIN D3) 5000 UNITS capsule capsule Take 1 capsule by mouth Daily.      cloNIDine (Catapres) 0.2 MG tablet Take 1 tablet by mouth Every Night for 90 days. 30 tablet 2    diclofenac (VOLTAREN) 1 % gel gel Place 4 g on the skin as directed by provider 4 (Four) Times a Day.      estradiol (MINIVELLE, VIVELLE-DOT) 0.05 MG/24HR patch APPLY ONE PATCH TO SKIN TWO TIMES A WEEK      hydrocortisone 2.5 % ointment       omeprazole OTC (PriLOSEC OTC) 20 MG EC tablet Take 1 tablet by mouth Daily.      Progesterone (PROMETRIUM) 100 MG capsule Take 1 capsule by mouth Every Night.      valACYclovir (Valtrex) 1000 MG tablet Take 1 tablet by mouth 3 (Three) Times a Day. Take for 7 days if outbreak 21 tablet 5    venlafaxine XR (EFFEXOR-XR) 150 MG 24 hr capsule Take 1 capsule by mouth Daily. 90 capsule 0    vitamin B-12 (CYANOCOBALAMIN) 1000 MCG tablet Take 1 tablet by mouth Daily. 90 tablet 2     No current facility-administered medications for this visit.       Mental Status Exam: The patient was alert and oriented to time, place, person, and situation. Neatly groomed and dressed. Good eye contact. Cooperative and answers questions willingly. No evidence of gait imbalance or shuffling. Normal speech rate, rhythm, and tone. Reports good mood. Affect congruent with mood. Denies SI/HI/AVH. Organized thought process. No evidence of delusional thinking. Fair insight, fair  "judgement. Impulse control intact. Memory intact with average to above average intellectual functioning.      Objective   Vital Signs:   /84   Pulse 103   Ht 162.6 cm (64.02\")   Wt 74.4 kg (164 lb)   SpO2 96%   BMI 28.14 kg/m²       Result Review :     The following data was reviewed by Cookie Guzman, DNP, APRN, PMHNP-BC  CMP          1/3/2024    22:29   CMP   Glucose 141    BUN 24    Creatinine 0.78    EGFR 91.5    Sodium 137    Potassium 4.1    Chloride 105    Calcium 8.6    Total Protein 7.0    Albumin 4.6    Globulin 2.4    Total Bilirubin 0.4    Alkaline Phosphatase 166    AST (SGOT) 289    ALT (SGPT) 209    Albumin/Globulin Ratio 1.9    BUN/Creatinine Ratio 30.8    Anion Gap 12.0      CBC w/diff          1/3/2024    22:29   CBC w/Diff   WBC 11.28    RBC 4.59    Hemoglobin 13.9    Hematocrit 42.3    MCV 92.2    MCH 30.3    MCHC 32.9    RDW 12.0    Platelets 304    Neutrophil Rel % 91.9    Immature Granulocyte Rel % 0.3    Lymphocyte Rel % 3.2    Monocyte Rel % 3.0    Eosinophil Rel % 1.4    Basophil Rel % 0.2                   PHQ-9 Score:   PHQ-9 Total Score: 1    PHQ-9 Depression Screening  Little interest or pleasure in doing things? Not at all   Feeling down, depressed, or hopeless? Not at all   PHQ-2 Total Score 0   Trouble falling or staying asleep, or sleeping too much? Several days   Feeling tired or having little energy? Not at all   Poor appetite or overeating? Not at all   Feeling bad about yourself - or that you are a failure or have let yourself or your family down? Not at all   Trouble concentrating on things, such as reading the newspaper or watching television? Not at all   Moving or speaking so slowly that other people could have noticed? Or the opposite - being so fidgety or restless that you have been moving around a lot more than usual? Not at all   Thoughts that you would be better off dead, or of hurting yourself in some way? Not at all   PHQ-9 Total Score 1   If you " checked off any problems, how difficult have these problems made it for you to do your work, take care of things at home, or get along with other people? Not difficult at all         ARCENIO-7      Over the last two weeks, how often have you been bothered by the following problems?  Feeling nervous, anxious or on edge: Several days  Not being able to stop or control worrying: Not at all  Worrying too much about different things: Not at all  Trouble Relaxing: Not at all  Being so restless that it is hard to sit still: Not at all  Becoming easily annoyed or irritable: Not at all  Feeling afraid as if something awful might happen: Not at all  ARCENIO 7 Total Score: 1  If you checked any problems, how difficult have these problems made it for you to do your work, take care of things at home, or get along with other people: Not difficult at all                   Impression/Treatment Plan:  The patient presents for an initial psychiatric evaluation to establish care. She was previously under the care of VICKIE Farah.  She reports stability and satisfaction with her current medication regimen comprising Venlafaxine, Zolpidem, and Clonidine. Her depressive symptoms are well-managed, and she experiences significant improvement in her insomnia with the prescribed medications, facilitating a consistent sleep schedule. The patient plans to continue current medications, with regular blood pressure monitoring due to Clonidine use, and anticipates a follow-up in three months.    1. Anxiety Disorder:   - Pt reports hx of panic attacks, characterized by not being able to breathe and crying. Describes significant anxiety related to past trauma involving a neighbor’s dog. Improved anxiety since the offending dog moved away.  - Plan: Continue venlafaxine  mg daily. Advise pt to monitor for any increase in symptoms.    2. Insomnia:   - Pt takes Zolpidem and clonidine for sleep. Reports that Zolpidem alone leads to early  awakening, but combined with clonidine, sleep is improved. Reports stable regimen of sleep 10 p.m. - 7 a.m.  -Plan: Continue zolpidem as per current regimen. Recent prescription sent in 12/18/24 by provider covering Rafael Anaya. Pt to continue with clonidine. Monitor for efficacy and SE. No BP issues reported, but pt to check BP weekly at home.     3. Depression/Mood:   - Pt self-reports mood as more on the positive side, indicating current good emotional state. No major depressive sx noted.  - Plan: Continue venlafaxine 150 mg daily, as pt reports stable mood and significant improvement. No changes to current regimen.    4. F/U: Schedule f/u in 3 mos due to stability. Pt advised to contact provider if issues arise before next f/u.    5. Agreement: Pt signed controlled substances agreement due to Ambien use.      Diagnoses and all orders for this visit:    1. Insomnia, unspecified type  -     cloNIDine (Catapres) 0.2 MG tablet; Take 1 tablet by mouth Every Night for 90 days.  Dispense: 30 tablet; Refill: 2  -     Discontinue: zolpidem (AMBIEN) 10 MG tablet; Take 1 tablet by mouth At Night As Needed for Sleep for up to 30 days.  Dispense: 30 tablet; Refill: 0    2. Generalized anxiety disorder  -     cloNIDine (Catapres) 0.2 MG tablet; Take 1 tablet by mouth Every Night for 90 days.  Dispense: 30 tablet; Refill: 2  -     venlafaxine XR (EFFEXOR-XR) 150 MG 24 hr capsule; Take 1 capsule by mouth Daily.  Dispense: 90 capsule; Refill: 0    3. High risk medication use  -     Discontinue: zolpidem (AMBIEN) 10 MG tablet; Take 1 tablet by mouth At Night As Needed for Sleep for up to 30 days.  Dispense: 30 tablet; Refill: 0    4. Current moderate episode of major depressive disorder without prior episode  -     venlafaxine XR (EFFEXOR-XR) 150 MG 24 hr capsule; Take 1 capsule by mouth Daily.  Dispense: 90 capsule; Refill: 0        MEDS ORDERED DURING VISIT:  New Medications Ordered This Visit   Medications    cloNIDine  (Catapres) 0.2 MG tablet     Sig: Take 1 tablet by mouth Every Night for 90 days.     Dispense:  30 tablet     Refill:  2    venlafaxine XR (EFFEXOR-XR) 150 MG 24 hr capsule     Sig: Take 1 capsule by mouth Daily.     Dispense:  90 capsule     Refill:  0       Follow up with Cookie Guzman, LUZMA, APRN, PMMJP-BC in this office in 3 months.          PATIENT EDUCATION:  Treatment and medication options discussed during today's visit. Patient acknowledged and verbally consented to continue with current treatment plan and was educated on the importance of compliance with treatment and follow-up appointments. Patient is agreeable to call the office with any worsening of symptoms or onset of side effects. Patient is agreeable to call 911 or go to the nearest ER should he/she begin having SI/HI.    SAFETY PLAN:  Patient was given ample time for questions and fully participated in treatment planning.  Patient was encouraged to call the clinic with any questions or concerns.  Patient was informed of access to emergency care. If patient were to develop any significant symptomatology, suicidal ideation, homicidal ideation, any concerns, or feel unsafe at any time they are to call the clinic and if unable to get immediate assistance should immediately call 911 or go to the nearest emergency room.  The patient is advised to remove or secure (lock away) all lethal weapons (including guns) and sharps (including razors, scissors, knives, etc.).  All medications (including any prescribed and any over the counter medications) should be stored in a safe and secured location that is not obtainable by children/adolescents.  Patient was given an opportunity and encouraged to ask questions about their medication, illness, and treatment. Patient contracted verbally for the following: If you are experiencing an emotional crisis or have thoughts of harming yourself or others, please go to your nearest local emergency room or call 911.  Will continue to re-assess medication response and side effects frequently to establish efficacy and ensure safety. Risks, any black box warnings, side effects, off label usage, and benefits of medication and treatment discussed with patient, along with potential adverse side effects of current and/or newly prescribed medication, alternative treatment options, and OTC medications.  Patient verbalized understanding of potential risks, any off label use of medication, any black box warnings, and any side effects in their own words. The patient verbalized understanding and agreed to comply with the safety plan discussed in their own words.  Patient given the number to the office. Number also available to the 24- hour suicide hotline.     Short Term Goals: Continue building rapport with the patient. Patient will be compliant with medication, and patient will have no significant medication related side effects.  Patient will be engaged in psychotherapy as indicated.  Patient will report subjective improvement of symptoms.     Long term goals: To stabilize mood and treat/improve subjective symptoms, the patient will stay out of the hospital, the patient will be at an optimal level of functioning, and the patient will take all medications as prescribed.     Jamel reviewed and is appropriate.    Cookie Guzman, DNP, APRN, PMHNP-BC    Part of this note may be an electronic transcription/translation of spoken language to printed text using the Dragon Dictation System.

## 2025-02-06 DIAGNOSIS — F32.1 CURRENT MODERATE EPISODE OF MAJOR DEPRESSIVE DISORDER WITHOUT PRIOR EPISODE: ICD-10-CM

## 2025-02-06 DIAGNOSIS — G47.00 INSOMNIA, UNSPECIFIED TYPE: Primary | ICD-10-CM

## 2025-02-06 DIAGNOSIS — F41.1 GENERALIZED ANXIETY DISORDER: ICD-10-CM

## 2025-02-06 RX ORDER — VENLAFAXINE HYDROCHLORIDE 150 MG/1
150 CAPSULE, EXTENDED RELEASE ORAL DAILY
Qty: 90 CAPSULE | Refills: 0 | Status: CANCELLED | OUTPATIENT
Start: 2025-02-06

## 2025-02-06 RX ORDER — ZOLPIDEM TARTRATE 10 MG/1
10 TABLET ORAL NIGHTLY PRN
COMMUNITY
Start: 2025-01-15 | End: 2025-02-06 | Stop reason: SDUPTHER

## 2025-02-06 NOTE — TELEPHONE ENCOUNTER
Rx Refill Note  Requested Prescriptions     Pending Prescriptions Disp Refills    zolpidem (AMBIEN) 10 MG tablet       Sig: Take 1 tablet by mouth At Night As Needed for Sleep.      Last office visit with prescribing clinician: 12/23/2024   Last telemedicine visit with prescribing clinician: Visit date not found   Next office visit with prescribing clinician: 3/24/2025     Myriam Butler MA  02/06/25, 16:04 EST

## 2025-02-06 NOTE — TELEPHONE ENCOUNTER
I changed the pharmacy with her medications, she stated that she wanted the Zolpidem sent to [facility] and Effexor through [facility]. I changed the pharmacy on both when I sent it over to you.

## 2025-02-06 NOTE — TELEPHONE ENCOUNTER
Rx Refill Note  Requested Prescriptions     Pending Prescriptions Disp Refills    zolpidem (AMBIEN) 10 MG tablet       Sig: Take 1 tablet by mouth At Night As Needed for Sleep.    venlafaxine XR (EFFEXOR-XR) 150 MG 24 hr capsule 90 capsule 0     Sig: Take 1 capsule by mouth Daily.      Last office visit with prescribing clinician: 12/23/2024   Last telemedicine visit with prescribing clinician: Visit date not found   Next office visit with prescribing clinician: 3/24/2025     Myriam Butler MA  02/06/25, 16:04 EST

## 2025-02-10 ENCOUNTER — TELEPHONE (OUTPATIENT)
Age: 55
End: 2025-02-10

## 2025-02-10 DIAGNOSIS — F41.1 GENERALIZED ANXIETY DISORDER: ICD-10-CM

## 2025-02-10 DIAGNOSIS — F32.1 CURRENT MODERATE EPISODE OF MAJOR DEPRESSIVE DISORDER WITHOUT PRIOR EPISODE: ICD-10-CM

## 2025-02-10 RX ORDER — VENLAFAXINE HYDROCHLORIDE 150 MG/1
150 CAPSULE, EXTENDED RELEASE ORAL DAILY
Qty: 90 CAPSULE | Refills: 0 | Status: SHIPPED | OUTPATIENT
Start: 2025-02-10

## 2025-02-10 RX ORDER — ZOLPIDEM TARTRATE 10 MG/1
10 TABLET ORAL NIGHTLY PRN
Qty: 30 TABLET | Refills: 0 | Status: SHIPPED | OUTPATIENT
Start: 2025-02-10

## 2025-02-10 NOTE — TELEPHONE ENCOUNTER
Patient called and stated that she is needing her medication refilled, she would like Zolpidem sent to [facility] and Effexor sent through [facility], I changed the pharmacy on both RX when I sent over the refill on 2/6.

## 2025-03-05 DIAGNOSIS — G47.00 INSOMNIA, UNSPECIFIED TYPE: ICD-10-CM

## 2025-03-05 DIAGNOSIS — F41.1 GENERALIZED ANXIETY DISORDER: ICD-10-CM

## 2025-03-07 RX ORDER — CLONIDINE HYDROCHLORIDE 0.2 MG/1
0.2 TABLET ORAL NIGHTLY
Qty: 30 TABLET | Refills: 2 | Status: SHIPPED | OUTPATIENT
Start: 2025-03-07

## 2025-03-07 RX ORDER — ZOLPIDEM TARTRATE 10 MG/1
10 TABLET ORAL NIGHTLY PRN
Qty: 30 TABLET | Refills: 0 | Status: SHIPPED | OUTPATIENT
Start: 2025-03-07

## 2025-03-24 ENCOUNTER — OFFICE VISIT (OUTPATIENT)
Age: 55
End: 2025-03-24
Payer: COMMERCIAL

## 2025-03-24 VITALS
SYSTOLIC BLOOD PRESSURE: 116 MMHG | BODY MASS INDEX: 26.8 KG/M2 | HEART RATE: 72 BPM | DIASTOLIC BLOOD PRESSURE: 71 MMHG | HEIGHT: 64 IN | OXYGEN SATURATION: 92 % | WEIGHT: 157 LBS

## 2025-03-24 DIAGNOSIS — F41.1 GENERALIZED ANXIETY DISORDER: ICD-10-CM

## 2025-03-24 DIAGNOSIS — F32.1 CURRENT MODERATE EPISODE OF MAJOR DEPRESSIVE DISORDER WITHOUT PRIOR EPISODE: ICD-10-CM

## 2025-03-24 DIAGNOSIS — Z79.899 HIGH RISK MEDICATION USE: ICD-10-CM

## 2025-03-24 DIAGNOSIS — G47.00 INSOMNIA, UNSPECIFIED TYPE: Primary | ICD-10-CM

## 2025-03-24 DIAGNOSIS — F43.10 POST TRAUMATIC STRESS DISORDER (PTSD): ICD-10-CM

## 2025-03-24 PROCEDURE — 99214 OFFICE O/P EST MOD 30 MIN: CPT

## 2025-03-24 RX ORDER — CLONIDINE HYDROCHLORIDE 0.2 MG/1
0.2 TABLET ORAL NIGHTLY
Qty: 30 TABLET | Refills: 2 | Status: SHIPPED | OUTPATIENT
Start: 2025-03-24

## 2025-03-24 NOTE — PROGRESS NOTES
"     Office  Follow Up Visit      Patient Name: Juana Lopez  : 1970   MRN: [id number]     Referring Provider: Gamal Gamez MD    Chief Complaint:  \"management of insomnia, anxiety, depression\"      ICD-10-CM ICD-9-CM   1. Insomnia, unspecified type  G47.00 780.52   2. Generalized anxiety disorder  F41.1 300.02   3. Current moderate episode of major depressive disorder without prior episode  F32.1 296.22   4. High risk medication use  Z79.899 V58.69   5. Post traumatic stress disorder (PTSD)  F43.10 309.81        History of Present Illness:   Juana Lopez is a 55 y.o. female presenting for a routine follow up appointment for psychiatric medication management.        She reports a significant improvement in her condition, attributing this to the absence of daily exposure to her neighbor's dog, which had previously triggered her symptoms after an attack. She recently returned from a cruise where she encountered numerous dogs but managed to maintain her composure without experiencing a panic attack. She has been on venlafaxine, which she believes is effectively managing her symptoms. She has been on a lot of different medications over the years, but venlafaxine seems to be working for her.    She also reports an improvement in her sleep pattern, attributing this to the combined use of Ambien and clonidine.     MEDICATIONS  Current: Venlafaxine, Ambien, clonidine.    Subjective      Review of Systems:   Ears, Nose, Mouth, & Throat: Denies difficulty hearing, smelling, sinus problems, sore throat, or dentition.  Heart/Vascular: Denies rapid heart rate, chest pain, swelling of feet or legs   Respiratory: Denies shortness of breath, cough, or wheeze  GI/: Denies nausea, vomiting, constipation, diarrhea, abdominal pain, painful urination, frequent urination  MSK: Denies swelling or joint deformities  Skin: Denies lesions or rash  Neurologic: Denies headaches, dizziness, or tremor   Endocrine: Denies heat " or cold intolerance   Hematologic: Denies easy bruising or bleeding  Psychiatric: Denies insomnia, irritability, depression, anxiety, recurrent bad thoughts, mood swings, hallucinations, compulsions    PHQ-9 Depression Screening  Little interest or pleasure in doing things?     Feeling down, depressed, or hopeless?     PHQ-2 Total Score     Trouble falling or staying asleep, or sleeping too much?     Feeling tired or having little energy?     Poor appetite or overeating?     Feeling bad about yourself - or that you are a failure or have let yourself or your family down?     Trouble concentrating on things, such as reading the newspaper or watching television?     Moving or speaking so slowly that other people could have noticed? Or the opposite - being so fidgety or restless that you have been moving around a lot more than usual?       Thoughts that you would be better off dead, or of hurting yourself in some way?     PHQ-9 Total Score     If you checked off any problems, how difficult have these problems made it for you to do your work, take care of things at home, or get along with other people?           ARCENIO-7           Patient History:   The following portions of the patient's history were reviewed and updated as appropriate: allergies, current medications, past family history, past medical history, past social history, past surgical history and problem list.     Social History     Socioeconomic History    Marital status: Single    Number of children: 2   Tobacco Use    Smoking status: Former     Current packs/day: 0.00     Average packs/day: 0.3 packs/day for 5.0 years (1.3 ttl pk-yrs)     Types: Cigarettes     Start date:      Quit date: 2012     Years since quittin.2     Passive exposure: Past    Smokeless tobacco: Never    Tobacco comments:     smoked less than 1 pack per day for 10 year   Vaping Use    Vaping status: Never Used   Substance and Sexual Activity    Alcohol use: Yes     Comment: once  every couple of months-7 or less drinks per week    Drug use: No    Sexual activity: Not Currently     Partners: Male     Birth control/protection: Tubal ligation       Family History   Problem Relation Age of Onset    Heart attack Father     Diabetes Father     Stroke Father     Pancreatitis Father     Diabetes Sister     Breast cancer Maternal Grandmother     Diabetes Paternal Grandmother     Osteoporosis Paternal Grandmother     Diabetes Paternal Grandfather     Arthritis Mother     No Known Problems Other        Medications:     Current Outpatient Medications:     cloNIDine (CATAPRES) 0.2 MG tablet, Take 1 tablet by mouth Every Night., Disp: 30 tablet, Rfl: 2    venlafaxine XR (EFFEXOR-XR) 150 MG 24 hr capsule, Take 1 capsule by mouth Daily., Disp: 90 capsule, Rfl: 0    Bioflavonoid Products (VITAMIN C) chewable tablet, Chew 240 mg 2 (Two) Times a Day., Disp: , Rfl:     Cholecalciferol (VITAMIN D3) 5000 UNITS capsule capsule, Take 1 capsule by mouth Daily., Disp: , Rfl:     diclofenac (VOLTAREN) 1 % gel gel, Place 4 g on the skin as directed by provider 4 (Four) Times a Day., Disp: , Rfl:     estradiol (MINIVELLE, VIVELLE-DOT) 0.05 MG/24HR patch, APPLY ONE PATCH TO SKIN TWO TIMES A WEEK, Disp: , Rfl:     hydrocortisone 2.5 % ointment, , Disp: , Rfl:     omeprazole OTC (PriLOSEC OTC) 20 MG EC tablet, Take 1 tablet by mouth Daily., Disp: , Rfl:     Progesterone (PROMETRIUM) 100 MG capsule, Take 1 capsule by mouth Every Night., Disp: , Rfl:     valACYclovir (Valtrex) 1000 MG tablet, Take 1 tablet by mouth 3 (Three) Times a Day. Take for 7 days if outbreak, Disp: 21 tablet, Rfl: 5    vitamin B-12 (CYANOCOBALAMIN) 1000 MCG tablet, Take 1 tablet by mouth Daily., Disp: 90 tablet, Rfl: 2    zolpidem (AMBIEN) 10 MG tablet, TAKE 1 TABLET BY MOUTH AT NIGHT AS NEEDED FOR SLEEP, Disp: 30 tablet, Rfl: 0    Objective     Physical Exam:  Vital Signs:   Vitals:    03/24/25 1145   BP: 116/71   Pulse: 72   SpO2: 92%   Weight: 71.2  "kg (157 lb)   Height: 162.6 cm (64.02\")     Body mass index is 26.94 kg/m².     Mental Status Exam:   The patient was alert and oriented to time, place, person, and situation. Neatly groomed and dressed. Good eye contact. Cooperative and answers questions willingly. No evidence of gait imbalance or shuffling. Normal speech rate, rhythm, and tone. Reports good mood. Affect congruent with mood. Denies SI/HI/AVH. Organized thought process. No evidence of delusional thinking. Good insight, good judgement. Impulse control intact. Memory intact with average to above average intellectual functioning.     No Known Allergies     Patient's last menstrual period was 07/12/2016.     Current Medications:   Current Outpatient Medications   Medication Sig Dispense Refill    cloNIDine (CATAPRES) 0.2 MG tablet Take 1 tablet by mouth Every Night. 30 tablet 2    venlafaxine XR (EFFEXOR-XR) 150 MG 24 hr capsule Take 1 capsule by mouth Daily. 90 capsule 0    Bioflavonoid Products (VITAMIN C) chewable tablet Chew 240 mg 2 (Two) Times a Day.      Cholecalciferol (VITAMIN D3) 5000 UNITS capsule capsule Take 1 capsule by mouth Daily.      diclofenac (VOLTAREN) 1 % gel gel Place 4 g on the skin as directed by provider 4 (Four) Times a Day.      estradiol (MINIVELLE, VIVELLE-DOT) 0.05 MG/24HR patch APPLY ONE PATCH TO SKIN TWO TIMES A WEEK      hydrocortisone 2.5 % ointment       omeprazole OTC (PriLOSEC OTC) 20 MG EC tablet Take 1 tablet by mouth Daily.      Progesterone (PROMETRIUM) 100 MG capsule Take 1 capsule by mouth Every Night.      valACYclovir (Valtrex) 1000 MG tablet Take 1 tablet by mouth 3 (Three) Times a Day. Take for 7 days if outbreak 21 tablet 5    vitamin B-12 (CYANOCOBALAMIN) 1000 MCG tablet Take 1 tablet by mouth Daily. 90 tablet 2    zolpidem (AMBIEN) 10 MG tablet TAKE 1 TABLET BY MOUTH AT NIGHT AS NEEDED FOR SLEEP 30 tablet 0     No current facility-administered medications for this visit. "       @RESULASTCBCDIFFPANEL,TSH,LABLIPI,WLLAIXVX65,ZRSHRSOH41,MG,FOLATE,PROLACTIN,CRPRESULT,CMP,[id number])@    Lab Results   Component Value Date    GLUCOSE 141 (H) 01/03/2024    BUN 24 (H) 01/03/2024    CREATININE 0.78 01/03/2024    EGFR 91.5 01/03/2024    BCR 30.8 (H) 01/03/2024    K 4.1 01/03/2024    CO2 20.0 (L) 01/03/2024    CALCIUM 8.6 01/03/2024    ALBUMIN 4.6 01/03/2024    BILITOT 0.4 01/03/2024     (H) 01/03/2024     (H) 01/03/2024       Lab Results   Component Value Date    WBC 11.28 (H) 01/03/2024    HGB 13.9 01/03/2024    HCT 42.3 01/03/2024    MCV 92.2 01/03/2024     01/03/2024       Lab Results   Component Value Date    CHLPL 183 05/02/2022    TRIG 157 (H) 05/02/2022    HDL 58 05/02/2022    LDL 98 05/02/2022                  Assessment / Plan      Impression/Treatment Plan:       1. Post-traumatic stress disorder (PTSD).  She reports significant improvement in managing her PTSD symptoms, particularly noting better control during a recent cruise where she encountered dogs, a previous trigger. Venlafaxine has been effective in managing her symptoms. She is advised to continue with venlafaxine  mg daily as it has shown efficacy in treating PTSD.    2. Anxiety.  She has been experiencing anxiety, especially related to her caregiving responsibilities for her mother with dementia. Venlafaxine has been helpful in managing her anxiety symptoms. She is encouraged to continue with her current medication regimen.    3. Depression.  She reports feeling better overall but still experiences occasional depressive episodes, particularly when caring for her mother. Venlafaxine has been beneficial in stabilizing her mood. She is advised to maintain her current treatment plan and continue taking her vitamins and vitamin D supplements.    4. Insomnia.  She reports that Ambien 10 mg at night helps her fall asleep, while clonidine 0.2 mg helps her maintain sleep for a longer duration. She is  advised to continue with this combination to manage her insomnia effectively.       Diagnoses and all orders for this visit:    1. Insomnia, unspecified type (Primary)  -     cloNIDine (CATAPRES) 0.2 MG tablet; Take 1 tablet by mouth Every Night.  Dispense: 30 tablet; Refill: 2    2. Generalized anxiety disorder  -     cloNIDine (CATAPRES) 0.2 MG tablet; Take 1 tablet by mouth Every Night.  Dispense: 30 tablet; Refill: 2    3. Current moderate episode of major depressive disorder without prior episode    4. High risk medication use    5. Post traumatic stress disorder (PTSD)        MEDS ORDERED DURING VISIT:  New Medications Ordered This Visit   Medications    cloNIDine (CATAPRES) 0.2 MG tablet     Sig: Take 1 tablet by mouth Every Night.     Dispense:  30 tablet     Refill:  2       Follow up in this office in 3 months or sooner as needed.          PATIENT EDUCATION:  Discussed medication options and treatment plan of prescribed medication(s) as well as the risks, benefits, and potential side effects. Patient is agreeable to call the office with any worsening of symptoms or onset of side effects. Patient is agreeable to call 911 or go to the nearest ER should he/she begin having SI/HI.    SAFETY PLAN:  Patient was given ample time for questions and fully participated in treatment planning.  Patient was encouraged to call the clinic with any questions or concerns.  Patient was informed of access to emergency care. If patient were to develop any significant symptomatology, suicidal ideation, homicidal ideation, any concerns, or feel unsafe at any time they are to call the clinic and if unable to get immediate assistance should immediately call 911 or go to the nearest emergency room.  The patient is advised to remove or secure (lock away) all lethal weapons (including guns) and sharps (including razors, scissors, knives, etc.).  All medications (including any prescribed and any over the counter medications) should be  stored in a safe and secured location that is not obtainable by children/adolescents.  Patient was given an opportunity and encouraged to ask questions about their medication, illness, and treatment. Patient contracted verbally for the following: If you are experiencing an emotional crisis or have thoughts of harming yourself or others, please go to your nearest local emergency room or call 911. Will continue to re-assess medication response and side effects frequently to establish efficacy and ensure safety. Risks, any black box warnings, side effects, off label usage, and benefits of medication and treatment discussed with patient, along with potential adverse side effects of current and/or newly prescribed medication, alternative treatment options, and OTC medications.  Patient verbalized understanding of potential risks, any off label use of medication, any black box warnings, and any side effects in their own words. The patient verbalized understanding and agreed to comply with the safety plan discussed in their own words.  Patient given the number to the office. Number also available to the 24- hour suicide hotline.     Short Term Goals: Continue building rapport with the patient. Patient will be compliant with medication, and patient will have no significant medication related side effects.  Patient will be engaged in psychotherapy as indicated.  Patient will report subjective improvement of symptoms.     Long term goals: To stabilize mood and treat/improve subjective symptoms, the patient will stay out of the hospital, the patient will be at an optimal level of functioning, and the patient will take all medications as prescribed.     Jamel reviewed and is appropriate.    Patient or patient representative verbalized consent for the use of Ambient Listening during the visit with  BELLA Brewer for chart documentation. 3/24/2025  12:23 EDT       Copied text in this note has been reviewed and is  accurate as of 3/24/2025.    Part of this note may be an electronic transcription/translation of spoken language to printed text using the Dragon Dictation System.    Cookie Guzman, LUZMA, APRN, PMHNP-BC

## 2025-04-10 DIAGNOSIS — G47.00 INSOMNIA, UNSPECIFIED TYPE: ICD-10-CM

## 2025-04-10 RX ORDER — ZOLPIDEM TARTRATE 10 MG/1
10 TABLET ORAL NIGHTLY PRN
Qty: 30 TABLET | Refills: 0 | Status: SHIPPED | OUTPATIENT
Start: 2025-04-10

## 2025-04-10 NOTE — TELEPHONE ENCOUNTER
Rx Refill Note  Requested Prescriptions     Pending Prescriptions Disp Refills    zolpidem (AMBIEN) 10 MG tablet [Pharmacy Med Name: ZOLPIDEM 10MG TABLETS] 30 tablet      Sig: TAKE 1 TABLET BY MOUTH AT NIGHT AS NEEDED FOR SLEEP      Last office visit with prescribing clinician: 3/24/2025   Last telemedicine visit with prescribing clinician: Visit date not found   Next office visit with prescribing clinician: Visit date not found     Quincy Mitchell MA  04/10/25, 10:59 EDT

## 2025-05-06 DIAGNOSIS — G47.00 INSOMNIA, UNSPECIFIED TYPE: ICD-10-CM

## 2025-05-06 NOTE — TELEPHONE ENCOUNTER
Rx Refill Note  Requested Prescriptions     Pending Prescriptions Disp Refills    zolpidem (AMBIEN) 10 MG tablet [Pharmacy Med Name: ZOLPIDEM 10MG TABLETS] 30 tablet      Sig: TAKE 1 TABLET BY MOUTH AT NIGHT AS NEEDED FOR SLEEP      Last office visit with prescribing clinician: 3/24/2025   Last telemedicine visit with prescribing clinician: Visit date not found   Next office visit with prescribing clinician: Visit date not found     Quincy Mitchell MA  05/06/25, 14:31 EDT

## 2025-05-08 RX ORDER — ZOLPIDEM TARTRATE 10 MG/1
10 TABLET ORAL NIGHTLY PRN
Qty: 30 TABLET | Refills: 0 | Status: SHIPPED | OUTPATIENT
Start: 2025-05-08

## 2025-05-15 DIAGNOSIS — F41.1 GENERALIZED ANXIETY DISORDER: ICD-10-CM

## 2025-05-15 DIAGNOSIS — F32.1 CURRENT MODERATE EPISODE OF MAJOR DEPRESSIVE DISORDER WITHOUT PRIOR EPISODE: ICD-10-CM

## 2025-05-15 RX ORDER — VENLAFAXINE HYDROCHLORIDE 150 MG/1
150 CAPSULE, EXTENDED RELEASE ORAL DAILY
Qty: 90 CAPSULE | Refills: 0 | Status: SHIPPED | OUTPATIENT
Start: 2025-05-15

## 2025-05-15 NOTE — TELEPHONE ENCOUNTER
Rx Refill Note  Requested Prescriptions     Pending Prescriptions Disp Refills    venlafaxine XR (EFFEXOR-XR) 150 MG 24 hr capsule 90 capsule 0     Sig: Take 1 capsule by mouth Daily.      Last office visit with prescribing clinician: 3/24/2025   Last telemedicine visit with prescribing clinician: Visit date not found   Next office visit with prescribing clinician: Visit date not found     Myriam Butler MA  05/15/25, 11:55 EDT

## 2025-06-03 DIAGNOSIS — F41.1 GENERALIZED ANXIETY DISORDER: ICD-10-CM

## 2025-06-03 DIAGNOSIS — G47.00 INSOMNIA, UNSPECIFIED TYPE: ICD-10-CM

## 2025-06-03 RX ORDER — ZOLPIDEM TARTRATE 10 MG/1
10 TABLET ORAL NIGHTLY PRN
Qty: 30 TABLET | Refills: 0 | Status: SHIPPED | OUTPATIENT
Start: 2025-06-06

## 2025-06-03 NOTE — TELEPHONE ENCOUNTER
Patient called and stated that her mother who has dementia threw away her clonidine stated that wanted to know if she is able to get another refill on this medication due to you going on vacation next week also stated that she doesn't want to do a police report because it is her mother and that she know she has a sickness, asked if you could give her a callback to discuss this matter.

## 2025-06-03 NOTE — TELEPHONE ENCOUNTER
Rx Refill Note  Requested Prescriptions     Pending Prescriptions Disp Refills    zolpidem (AMBIEN) 10 MG tablet 30 tablet 0     Sig: Take 1 tablet by mouth At Night As Needed for Sleep.      Last office visit with prescribing clinician: Visit date not found   Last telemedicine visit with prescribing clinician: Visit date not found   Next office visit with prescribing clinician: 6/18/2025     Current UDS on file? [x] Yes [] No  Lab :    New jen in media? [x] Yes [] No    Quincy Mitchell MA  06/03/25, 08:47 EDT

## 2025-06-05 RX ORDER — CLONIDINE HYDROCHLORIDE 0.2 MG/1
0.2 TABLET ORAL NIGHTLY
Qty: 30 TABLET | Refills: 2 | Status: SHIPPED | OUTPATIENT
Start: 2025-06-05

## 2025-06-05 NOTE — ADDENDUM NOTE
Addended by: SARAH HODGES on: 6/5/2025 09:27 AM     Modules accepted: Orders     Blood pressures are stable and acceptable on amlodipine 10 mg daily and carvedilol 6.25 mg twice a day.    Patient has now been switched to IV Bumex.  Follow blood pressures closely while being diuresed.

## 2025-06-05 NOTE — TELEPHONE ENCOUNTER
Patient called again asking about her clonidine.    Patient would like to have a refill early for her clonidine.    Ok to refillRx Refill Note  Requested Prescriptions     Pending Prescriptions Disp Refills    cloNIDine (CATAPRES) 0.2 MG tablet 30 tablet 2     Sig: Take 1 tablet by mouth Every Night.     Signed Prescriptions Disp Refills    zolpidem (AMBIEN) 10 MG tablet 30 tablet 0     Sig: Take 1 tablet by mouth At Night As Needed for Sleep.     Authorizing Provider: CHIKA TORRES      Last office visit with prescribing clinician: Visit date not found   Last telemedicine visit with prescribing clinician: Visit date not found   Next office visit with prescribing clinician: 6/18/2025     Quincy Mitchell MA  06/05/25, 09:26 EDT

## 2025-06-18 ENCOUNTER — OFFICE VISIT (OUTPATIENT)
Age: 55
End: 2025-06-18
Payer: COMMERCIAL

## 2025-06-18 VITALS
DIASTOLIC BLOOD PRESSURE: 69 MMHG | HEIGHT: 64 IN | BODY MASS INDEX: 27.31 KG/M2 | SYSTOLIC BLOOD PRESSURE: 103 MMHG | HEART RATE: 64 BPM | OXYGEN SATURATION: 98 % | WEIGHT: 160 LBS

## 2025-06-18 DIAGNOSIS — F43.10 POST TRAUMATIC STRESS DISORDER (PTSD): ICD-10-CM

## 2025-06-18 DIAGNOSIS — F32.1 CURRENT MODERATE EPISODE OF MAJOR DEPRESSIVE DISORDER WITHOUT PRIOR EPISODE: ICD-10-CM

## 2025-06-18 DIAGNOSIS — F51.05 INSOMNIA DUE TO OTHER MENTAL DISORDER: Primary | ICD-10-CM

## 2025-06-18 DIAGNOSIS — F41.1 GENERALIZED ANXIETY DISORDER: ICD-10-CM

## 2025-06-18 DIAGNOSIS — F99 INSOMNIA DUE TO OTHER MENTAL DISORDER: Primary | ICD-10-CM

## 2025-06-18 RX ORDER — ALPRAZOLAM 0.25 MG
.25-.5 TABLET ORAL DAILY PRN
Qty: 6 TABLET | Refills: 0 | Status: SHIPPED | OUTPATIENT
Start: 2025-06-18 | End: 2025-06-21

## 2025-06-18 NOTE — PROGRESS NOTES
Angel Lopez is a 55 y.o. female who presents today in follow up for management of insomnia, major depressive disorder, generalized anxiety disorder, and PTSD.    Patient reports that she is doing well overall and does endorse sustained improvement in psychiatric symptoms associated with her current medication regimen.  More specifically, patient currently denies a depressed mood as well as any other significant depressive symptoms including any anhedonia, low self worth, feelings of hopelessness or any active suicidal ideation, intent or plan.  Patient also reports improved levels of energy overall also associated with decreased fatigue and improved psychomotor retardation.  Patient also reports decreased levels of anxiety overall associated with improved ability to control/manage her anxiety and worry.  She denies worrying excessively about most things on most days at this time and does currently feel as if her depressive and anxious symptoms are well-managed.  Patient also reports no issues with the initiation of sleep or with sleep maintenance associated with her currently prescribed clonidine and zolpidem.  Patient denies experiencing any recurrent nightmares of flashbacks of past traumas since last visit.  Patient does however report rather significant anxiety directly related to an upcoming flight from [place] to [place] this Friday Friday, 06/20/2025, stating that she has been worrying excessively about the well being of herself and members of her family on a daily basis which has negatively impacted various aspects of her daily functioning.  As detailed above she denies any exacerbation of generalized anxiety but states that her apprehension and associated anxiety about her upcoming air travel is increasingly worsening on a daily basis. Patient otherwise denies any auditory, visual, or tactile hallucinations and does not currently appear to be responding to internal stimuli.  Patient  denies any generalized paranoia and displays no evidence of delusional thinking.    The following portions of the patient's history were reviewed and updated as appropriate: allergies, current medications, past family history, past medical history, past social history, past surgical history and problem list.  History of Present Illness               Past Medical History:  Past Medical History:   Diagnosis Date    Acute upper respiratory infection     Anemia     Anxiety     Arthritis     Benzodiazepine dependence 2023    Chronic pain disorder     Depression     External hemorrhoid, thrombosed     Fibromyalgia     Folliculitis     ARCENIO (generalized anxiety disorder)     Gastroparesis     GERD (gastroesophageal reflux disease)     GERD without esophagitis     Goiter 2016    Heartburn     Hemorrhoids     High risk medication use     History of staph infection     LAST TIME 4 YEARS AGO INSIDE RIGHT NARIS    Hypertriglyceridemia     Immunization deficiency     Infected prosthetic mesh of abdominal wall     Insomnia     MRSA carrier     Persistent insomnia     Pica     PONV (postoperative nausea and vomiting)     Post traumatic stress disorder (PTSD) 3/24/2025    Shingles     Strain of rectus abdominis muscle     Vitamin B12 deficiency     Volvulus of intestine        Social History:  Social History     Socioeconomic History    Marital status: Single    Number of children: 2   Tobacco Use    Smoking status: Former     Current packs/day: 0.00     Average packs/day: 0.3 packs/day for 5.0 years (1.3 ttl pk-yrs)     Types: Cigarettes     Start date:      Quit date:      Years since quittin.4     Passive exposure: Past    Smokeless tobacco: Never    Tobacco comments:     smoked less than 1 pack per day for 10 year   Vaping Use    Vaping status: Never Used   Substance and Sexual Activity    Alcohol use: Yes     Comment: once every couple of months-7 or less drinks per week    Drug use: No    Sexual  activity: Not Currently     Partners: Male     Birth control/protection: Tubal ligation       Family History:  Family History   Problem Relation Age of Onset    Heart attack Father     Diabetes Father     Stroke Father     Pancreatitis Father     Diabetes Sister     Breast cancer Maternal Grandmother     Diabetes Paternal Grandmother     Osteoporosis Paternal Grandmother     Diabetes Paternal Grandfather     Arthritis Mother     No Known Problems Other        Past Surgical History:  Past Surgical History:   Procedure Laterality Date    ABDOMINOPLASTY      BACK SURGERY  09/2015    Lower Back Surgery-by [facility]    BARIATRIC SURGERY      BILATERAL BREAST REDUCTION Bilateral 10/2021    CHOLECYSTECTOMY      COLECTOMY PARTIAL / TOTAL Right     Right Hemicolectomy    COLONOSCOPY N/A 07/12/2016    Procedure: COLONOSCOPY into small bowel;  Surgeon: Riley Narayan MD;  Location: [facility] ENDOSCOPY;  Service:     COLONOSCOPY N/A 04/11/2023    Procedure: COLONOSCOPY to anastamosis;  Surgeon: Riley Narayan MD;  Location: [facility] ENDOSCOPY;  Service: General;  Laterality: N/A;  normal    COSMETIC SURGERY  10/2021    EXPLORATORY LAPAROTOMY N/A 04/20/2016    Procedure: LAPAROTOMY EXPLORATORY, POSSIBLE BOWEL RESECTION, POSSIBLE OSTOMY;  Surgeon: Riley Narayan MD;  Location: [facility] OR;  Service:     EXPLORATORY LAPAROTOMY N/A 12/02/2016    Procedure: exploratory laparotomy, removal of  infected abdominal wall mesh. ;  Surgeon: Riley Narayan MD;  Location: [facility] OR;  Service:     GASTRIC BYPASS  2000    HIP ARTHROSCOPY W/ LABRAL REPAIR Left     LAPAROSCOPIC TUBAL LIGATION      OTHER SURGICAL HISTORY      LASER ON BACK    UMBILICAL HERNIA REPAIR         Problem List:  Patient Active Problem List   Diagnosis    Back pain    Goiter    Fatigue    Fibromyalgia    Herpes zoster    Insomnia    Iron deficiency    Myalgia    Vitamin B12 deficiency    Vitamin D deficiency     History of MRSA infection    Large bowel obstruction    Seasonal allergies    GERD without esophagitis    Hypertriglyceridemia    Generalized anxiety disorder    Malabsorption    Gastroenteritis    High risk medication use    Upper respiratory tract infection due to COVID-19 virus    Encounter for annual health examination    Encounter for hepatitis C screening test for low risk patient    Perforation of right tympanic membrane    Upper respiratory infection, acute    Current episode of major depressive disorder without prior episode    Hot flashes due to menopause    Greater trochanteric bursitis of left hip    Immunization deficiency    Dog bite of right upper extremity    Hematoma    Post traumatic stress disorder (PTSD)       Allergy:   No Known Allergies     Current Medications:   Current Outpatient Medications   Medication Sig Dispense Refill    ALPRAZolam (Xanax) 0.25 MG tablet Take 1-2 tablets by mouth Daily As Needed for Anxiety (For acute anxiety prior to upcoming air travel) for up to 3 days. Take approximately 20 to 30 minutes before takeoff. 6 tablet 0    Bioflavonoid Products (VITAMIN C) chewable tablet Chew 240 mg 2 (Two) Times a Day.      Cholecalciferol (VITAMIN D3) 5000 UNITS capsule capsule Take 1 capsule by mouth Daily.      cloNIDine (CATAPRES) 0.2 MG tablet Take 1 tablet by mouth Every Night. 30 tablet 2    diclofenac (VOLTAREN) 1 % gel gel Place 4 g on the skin as directed by provider 4 (Four) Times a Day.      estradiol (MINIVELLE, VIVELLE-DOT) 0.05 MG/24HR patch APPLY ONE PATCH TO SKIN TWO TIMES A WEEK      hydrocortisone 2.5 % ointment       omeprazole OTC (PriLOSEC OTC) 20 MG EC tablet Take 1 tablet by mouth Daily.      Progesterone (PROMETRIUM) 100 MG capsule Take 1 capsule by mouth Every Night.      valACYclovir (Valtrex) 1000 MG tablet Take 1 tablet by mouth 3 (Three) Times a Day. Take for 7 days if outbreak 21 tablet 5    venlafaxine XR (EFFEXOR-XR) 150 MG 24 hr capsule Take 1 capsule  "by mouth Daily. 90 capsule 0    vitamin B-12 (CYANOCOBALAMIN) 1000 MCG tablet Take 1 tablet by mouth Daily. 90 tablet 2    zolpidem (AMBIEN) 10 MG tablet Take 1 tablet by mouth At Night As Needed for Sleep. 30 tablet 0     No current facility-administered medications for this visit.       Review of Symptoms:    Review of Systems   Constitutional:  Negative for activity change and fatigue.   HENT:  Negative for tinnitus.    Respiratory:  Positive for shortness of breath.    Cardiovascular:  Positive for chest pain and palpitations.   Gastrointestinal:  Negative for nausea.   Skin:  Negative for rash.   Neurological:  Positive for dizziness and confusion. Negative for seizures.   Psychiatric/Behavioral:  Negative for hallucinations, self-injury, sleep disturbance, suicidal ideas and depressed mood. The patient is nervous/anxious.          Physical Exam:   Blood pressure 103/69, pulse 64, height 162.6 cm (64.02\"), weight 72.6 kg (160 lb), last menstrual period 07/12/2016, SpO2 98%, not currently breastfeeding.  Appearance: Appears documented age, appropriate hygiene and grooming.  Gait, Station, Strength: Normal gait, station and strength.  Physical Exam               Mental Status Exam:   Hygiene:   good  Cooperation:  Cooperative  Eye Contact:  Good  Psychomotor Behavior:  Appropriate  Affect:  Full range and Appropriate  Mood: normal and euthymic  Hopelessness: Denies  Speech:  Normal  Thought Process:  Goal directed and Linear  Thought Content:  Normal  Suicidal:  None  Homicidal:  None  Hallucinations:  None  Delusion:  None  Memory:  Intact  Orientation:  Person, Place, Time, and Situation  Reliability:  good  Insight:  Good  Judgement:  Good  Impulse Control:  Good      Lab Results:   No visits with results within 1 Month(s) from this visit.   Latest known visit with results is:   Admission on 01/03/2024, Discharged on 01/04/2024   Component Date Value Ref Range Status    QT Interval 01/03/2024 310  ms Final    " QTC Interval 01/03/2024 431  ms Final    Glucose 01/03/2024 141 (H)  65 - 99 mg/dL Final    BUN 01/03/2024 24 (H)  6 - 20 mg/dL Final    Creatinine 01/03/2024 0.78  0.57 - 1.00 mg/dL Final    Sodium 01/03/2024 137  136 - 145 mmol/L Final    Potassium 01/03/2024 4.1  3.5 - 5.2 mmol/L Final    Chloride 01/03/2024 105  98 - 107 mmol/L Final    CO2 01/03/2024 20.0 (L)  22.0 - 29.0 mmol/L Final    Calcium 01/03/2024 8.6  8.6 - 10.5 mg/dL Final    Total Protein 01/03/2024 7.0  6.0 - 8.5 g/dL Final    Albumin 01/03/2024 4.6  3.5 - 5.2 g/dL Final    ALT (SGPT) 01/03/2024 209 (H)  1 - 33 U/L Final    AST (SGOT) 01/03/2024 289 (H)  1 - 32 U/L Final    Alkaline Phosphatase 01/03/2024 166 (H)  39 - 117 U/L Final    Total Bilirubin 01/03/2024 0.4  0.0 - 1.2 mg/dL Final    Globulin 01/03/2024 2.4  gm/dL Final    A/G Ratio 01/03/2024 1.9  g/dL Final    BUN/Creatinine Ratio 01/03/2024 30.8 (H)  7.0 - 25.0 Final    Anion Gap 01/03/2024 12.0  5.0 - 15.0 mmol/L Final    eGFR 01/03/2024 91.5  >60.0 mL/min/1.73 Final    HS Troponin T 01/03/2024 <6  <14 ng/L Final    Extra Tube 01/03/2024 Hold for add-ons.   Final    Auto resulted.    Extra Tube 01/03/2024 hold for add-on   Final    Auto resulted    Extra Tube 01/03/2024 Hold for add-ons.   Final    Auto resulted.    Extra Tube 01/03/2024 Hold for add-ons.   Final    Auto resulted    WBC 01/03/2024 11.28 (H)  3.40 - 10.80 10*3/mm3 Final    RBC 01/03/2024 4.59  3.77 - 5.28 10*6/mm3 Final    Hemoglobin 01/03/2024 13.9  12.0 - 15.9 g/dL Final    Hematocrit 01/03/2024 42.3  34.0 - 46.6 % Final    MCV 01/03/2024 92.2  79.0 - 97.0 fL Final    MCH 01/03/2024 30.3  26.6 - 33.0 pg Final    MCHC 01/03/2024 32.9  31.5 - 35.7 g/dL Final    RDW 01/03/2024 12.0 (L)  12.3 - 15.4 % Final    RDW-SD 01/03/2024 40.6  37.0 - 54.0 fl Final    MPV 01/03/2024 10.3  6.0 - 12.0 fL Final    Platelets 01/03/2024 304  140 - 450 10*3/mm3 Final    Neutrophil % 01/03/2024 91.9 (H)  42.7 - 76.0 % Final    Lymphocyte %  01/03/2024 3.2 (L)  19.6 - 45.3 % Final    Monocyte % 01/03/2024 3.0 (L)  5.0 - 12.0 % Final    Eosinophil % 01/03/2024 1.4  0.3 - 6.2 % Final    Basophil % 01/03/2024 0.2  0.0 - 1.5 % Final    Immature Grans % 01/03/2024 0.3  0.0 - 0.5 % Final    Neutrophils, Absolute 01/03/2024 10.37 (H)  1.70 - 7.00 10*3/mm3 Final    Lymphocytes, Absolute 01/03/2024 0.36 (L)  0.70 - 3.10 10*3/mm3 Final    Monocytes, Absolute 01/03/2024 0.34  0.10 - 0.90 10*3/mm3 Final    Eosinophils, Absolute 01/03/2024 0.16  0.00 - 0.40 10*3/mm3 Final    Basophils, Absolute 01/03/2024 0.02  0.00 - 0.20 10*3/mm3 Final    Immature Grans, Absolute 01/03/2024 0.03  0.00 - 0.05 10*3/mm3 Final    nRBC 01/03/2024 0.0  0.0 - 0.2 /100 WBC Final    D-Dimer, Quantitative 01/03/2024 0.61 (H)  0.00 - 0.52 MCGFEU/mL Final    Magnesium 01/03/2024 2.1  1.6 - 2.6 mg/dL Final    COVID19 01/03/2024 Not Detected  Not Detected - Ref. Range Final    Influenza A PCR 01/03/2024 Not Detected  Not Detected Final    Influenza B PCR 01/03/2024 Not Detected  Not Detected Final    RSV, PCR 01/03/2024 Not Detected  Not Detected Final    HS Troponin T 01/04/2024 7  <14 ng/L Final    Troponin T Numeric Delta 01/04/2024    Final    Unable to calculate.     Results            PHQ-9 Total Score: 2    ARCENIO-7 Total Score: 9     Assessment & Plan    Diagnoses and all orders for this visit:    1. Insomnia due to other mental disorder (Primary)    2. Post traumatic stress disorder (PTSD)    3. Current moderate episode of major depressive disorder without prior episode    4. Generalized anxiety disorder  -     ALPRAZolam (Xanax) 0.25 MG tablet; Take 1-2 tablets by mouth Daily As Needed for Anxiety (For acute anxiety prior to upcoming air travel) for up to 3 days. Take approximately 20 to 30 minutes before takeoff.  Dispense: 6 tablet; Refill: 0         Media L John is a 55 y.o. female who presents today in follow up for management of insomnia, major depressive disorder, generalized  anxiety disorder, and PTSD.    Patient appears to be doing well overall and as detailed above does report sustained improvement in psychiatric symptoms associated with current medication regimen.  She reports consistent compliance with all psychiatric medications, denies any associated side effects and appears to be tolerating these medications well.  While the patient does report an overall improvement in many of her depressive and anxious symptoms as evidenced by current PHQ-9 and ARCENIO-7 scores, she does however endorse rather significant anxiety directly related to her upcoming air travel to [place] for a family vacation this Friday.  It is of note that patient was prescribed rather high dose of clonazepam for many years by previous provider and the patient emphasizes the fact that she has not traveled the applying since being tapered off of her clonazepam roughly 2 years ago.  As such I do think it is appropriate to prescribe a low dose of alprazolam with the intention of taking this medication 20 to 30 minutes before take off of her upcoming flights.  It was explained to the patient in great detail at today's visit that I have no intention on continuing this medication or prescribing it on a long-term basis and that a total of 6 tablets will be sent to her pharmacy at today's visit.  Otherwise I recommend making no medication changes at this time and patient will be seen again in approximately 12 weeks for reassessment.  Patient voices understanding of this and is agreeable to this plan.    Medications:  -Continue venlafaxine  mg p.o. once daily for management of major depressive disorder and generalized anxiety disorder.  - Continue zolpidem 10 mg p.o. nightly and clonidine 0.2 mg p.o. nightly for management of insomnia.    TREATMENT PLAN - SHORT AND LONG-TERM GOALS:   -Continue supportive psychotherapy efforts and medications as indicated. Treatment and medication options discussed during today's  visit.   -Patient acknowledged and verbally consented to continue with current treatment plan and was educated on the importance of compliance with treatment and follow-up appointments.    SUMMARY/EDUCATION/DISCUSSION:  -Pt was given appropriate time to ask questions and concerns were addressed. A thorough discussion was had that included review of disease process, need for continued monitoring and additional treatment options including use of pharmacological and non-pharmacological approaches to care, decisions were made and agreed upon by patient and provider.   -Discussed medication options and treatment plan of prescribed medication as well as the risks, benefits, and side effects including potential falls, possible impaired driving and metabolic adversities among others; patient acknowledged and provided verbal consent.   -Patient has been educated regarding multimodal approach with healthy nutrition, healthy sleep, regular physical activity, social activities, counseling, and medications.  -Please call the office at [phone] within normal business hours (Monday-Friday, 8:00 AM - 4:30 PM) with any worsening of symptoms or onset of intolerable side effects. Please ask to leave a message with office staff.  Please allow up to 24-48 hours for response to a patient call/question/refill request.  -Safety plan has been established and discussed in detail with the patient, who is agreeable to contact support system and/or call 911 or go to the nearest ER should he/she/they have any thoughts of harm to self or others.    MEDS ORDERED DURING VISIT:  New Medications Ordered This Visit   Medications    ALPRAZolam (Xanax) 0.25 MG tablet     Sig: Take 1-2 tablets by mouth Daily As Needed for Anxiety (For acute anxiety prior to upcoming air travel) for up to 3 days. Take approximately 20 to 30 minutes before takeoff.     Dispense:  6 tablet     Refill:  0       FOLLOW UP:  Return in about 12 weeks (around 9/10/2025)  for Next scheduled follow up.      Caleb Vale DO    This document has been electronically signed by Caleb Vale DO  June 18, 2025 16:22 EDT    Part of this note may be an electronic transcription/translation of spoken language to printed text using the Dragon Dictation System. Some of the data in this electronic note has been brought forward from a previous encounter, any necessary changes have been made, it has been reviewed by this provider, and it is accurate.    Answers submitted by the patient for this visit:  Anxiety (Submitted on 6/11/2025)  Chief Complaint: Anxiety  Visit: follow-up  Frequency: occasionally  Severity: moderate  dry mouth: No  excessive worry: Yes  insomnia: Yes  irritability: Yes  malaise/fatigue: No  obsessions: Yes  Sleep quality: good  Hours of sleep per night: 8 Hours  Medication compliance: %

## 2025-06-25 DIAGNOSIS — G47.00 INSOMNIA, UNSPECIFIED TYPE: ICD-10-CM

## 2025-06-25 RX ORDER — ZOLPIDEM TARTRATE 10 MG/1
10 TABLET ORAL NIGHTLY PRN
Qty: 30 TABLET | Refills: 0 | OUTPATIENT
Start: 2025-06-25

## 2025-07-02 DIAGNOSIS — G47.00 INSOMNIA, UNSPECIFIED TYPE: ICD-10-CM

## 2025-07-02 RX ORDER — ZOLPIDEM TARTRATE 10 MG/1
10 TABLET ORAL NIGHTLY PRN
Qty: 30 TABLET | Refills: 0 | Status: SHIPPED | OUTPATIENT
Start: 2025-07-02

## 2025-07-02 NOTE — TELEPHONE ENCOUNTER
Rx Refill Note  Requested Prescriptions     Pending Prescriptions Disp Refills    zolpidem (AMBIEN) 10 MG tablet 30 tablet 0     Sig: Take 1 tablet by mouth At Night As Needed for Sleep.      Last office visit with prescribing clinician: 6/18/2025   Last telemedicine visit with prescribing clinician: Visit date not found   Next office visit with prescribing clinician: 9/10/2025     Current UDS on file? [x] Yes [] No  Lab :    New jen in media? [x] Yes [] No    Quincy Mitchell MA  07/02/25, 16:00 EDT

## 2025-07-11 DIAGNOSIS — F32.1 CURRENT MODERATE EPISODE OF MAJOR DEPRESSIVE DISORDER WITHOUT PRIOR EPISODE: ICD-10-CM

## 2025-07-11 DIAGNOSIS — G47.00 INSOMNIA, UNSPECIFIED TYPE: ICD-10-CM

## 2025-07-11 DIAGNOSIS — F41.1 GENERALIZED ANXIETY DISORDER: ICD-10-CM

## 2025-07-11 RX ORDER — CLONIDINE HYDROCHLORIDE 0.2 MG/1
0.2 TABLET ORAL NIGHTLY
Qty: 90 TABLET | Refills: 0 | Status: SHIPPED | OUTPATIENT
Start: 2025-07-11

## 2025-07-11 RX ORDER — VENLAFAXINE HYDROCHLORIDE 150 MG/1
150 CAPSULE, EXTENDED RELEASE ORAL DAILY
Qty: 90 CAPSULE | Refills: 0 | Status: SHIPPED | OUTPATIENT
Start: 2025-07-11

## 2025-07-11 NOTE — TELEPHONE ENCOUNTER
Rx Refill Note  Requested Prescriptions     Pending Prescriptions Disp Refills    venlafaxine XR (EFFEXOR-XR) 150 MG 24 hr capsule 90 capsule 0     Sig: Take 1 capsule by mouth Daily.    cloNIDine (CATAPRES) 0.2 MG tablet 30 tablet 2     Sig: Take 1 tablet by mouth Every Night.      Last office visit with prescribing clinician: 6/18/2025   Last telemedicine visit with prescribing clinician: Visit date not found   Next office visit with prescribing clinician: 9/10/2025     Myriam Butler MA  07/11/25, 13:43 EDT     Patient called and stated that her medication was sent to [facility], instead of the [facility], stated that if she doesn't get it sent to the correct pharmacy soon it'll take over a month for her medication to get delivered to her.

## 2025-07-29 DIAGNOSIS — G47.00 INSOMNIA, UNSPECIFIED TYPE: ICD-10-CM

## 2025-07-29 RX ORDER — ZOLPIDEM TARTRATE 10 MG/1
10 TABLET ORAL NIGHTLY PRN
Qty: 30 TABLET | Refills: 0 | Status: SHIPPED | OUTPATIENT
Start: 2025-07-29

## 2025-07-29 NOTE — TELEPHONE ENCOUNTER
Rx Refill Note  Requested Prescriptions     Pending Prescriptions Disp Refills    zolpidem (AMBIEN) 10 MG tablet [Pharmacy Med Name: ZOLPIDEM 10MG TABLETS] 30 tablet      Sig: TAKE 1 TABLET BY MOUTH AT NIGHT AS NEEDED FOR SLEEP      Last office visit with prescribing clinician: 6/18/2025   Last telemedicine visit with prescribing clinician: Visit date not found   Next office visit with prescribing clinician: 9/10/2025     Current UDS on file? [] Yes [x] No  Lab :    New jen in media? [x] Yes [] No    Myriam Butler MA  07/29/25, 13:56 EDT

## (undated) DEVICE — LN SMPL CO2 SHTRM SD STREAM W/M LUER

## (undated) DEVICE — ADAPT CLN BIOGUARD AIR/H2O DISP

## (undated) DEVICE — KT ORCA ORCAPOD DISP STRL

## (undated) DEVICE — SENSR O2 OXIMAX FNGR A/ 18IN NONSTR

## (undated) DEVICE — TUBING, SUCTION, 1/4" X 10', STRAIGHT: Brand: MEDLINE

## (undated) DEVICE — CANN O2 ETCO2 FITS ALL CONN CO2 SMPL A/ 7IN DISP LF